# Patient Record
Sex: MALE | Race: WHITE | NOT HISPANIC OR LATINO | Employment: OTHER | URBAN - METROPOLITAN AREA
[De-identification: names, ages, dates, MRNs, and addresses within clinical notes are randomized per-mention and may not be internally consistent; named-entity substitution may affect disease eponyms.]

---

## 2023-11-22 DIAGNOSIS — M25.562 LEFT KNEE PAIN, UNSPECIFIED CHRONICITY: ICD-10-CM

## 2023-11-22 DIAGNOSIS — M79.672 LEFT FOOT PAIN: ICD-10-CM

## 2023-11-29 ENCOUNTER — APPOINTMENT (OUTPATIENT)
Dept: RADIOLOGY | Facility: CLINIC | Age: 73
End: 2023-11-29
Payer: OTHER GOVERNMENT

## 2023-11-29 ENCOUNTER — APPOINTMENT (OUTPATIENT)
Dept: ORTHOPEDIC SURGERY | Facility: CLINIC | Age: 73
End: 2023-11-29
Payer: OTHER GOVERNMENT

## 2023-12-06 ENCOUNTER — ANCILLARY PROCEDURE (OUTPATIENT)
Dept: RADIOLOGY | Facility: CLINIC | Age: 73
End: 2023-12-06
Payer: MEDICARE

## 2023-12-06 ENCOUNTER — OFFICE VISIT (OUTPATIENT)
Dept: ORTHOPEDIC SURGERY | Facility: CLINIC | Age: 73
End: 2023-12-06
Payer: OTHER GOVERNMENT

## 2023-12-06 ENCOUNTER — APPOINTMENT (OUTPATIENT)
Dept: RADIOLOGY | Facility: CLINIC | Age: 73
End: 2023-12-06
Payer: COMMERCIAL

## 2023-12-06 VITALS — BODY MASS INDEX: 35.78 KG/M2 | HEIGHT: 73 IN | WEIGHT: 270 LBS

## 2023-12-06 DIAGNOSIS — M79.672 LEFT FOOT PAIN: ICD-10-CM

## 2023-12-06 DIAGNOSIS — M25.562 LEFT KNEE PAIN, UNSPECIFIED CHRONICITY: ICD-10-CM

## 2023-12-06 DIAGNOSIS — M17.12 PRIMARY OSTEOARTHRITIS OF LEFT KNEE: Primary | ICD-10-CM

## 2023-12-06 PROCEDURE — 73564 X-RAY EXAM KNEE 4 OR MORE: CPT | Mod: LT

## 2023-12-06 PROCEDURE — 99214 OFFICE O/P EST MOD 30 MIN: CPT | Performed by: STUDENT IN AN ORGANIZED HEALTH CARE EDUCATION/TRAINING PROGRAM

## 2023-12-06 PROCEDURE — 1160F RVW MEDS BY RX/DR IN RCRD: CPT | Performed by: STUDENT IN AN ORGANIZED HEALTH CARE EDUCATION/TRAINING PROGRAM

## 2023-12-06 PROCEDURE — 1125F AMNT PAIN NOTED PAIN PRSNT: CPT | Performed by: STUDENT IN AN ORGANIZED HEALTH CARE EDUCATION/TRAINING PROGRAM

## 2023-12-06 PROCEDURE — 1036F TOBACCO NON-USER: CPT | Performed by: STUDENT IN AN ORGANIZED HEALTH CARE EDUCATION/TRAINING PROGRAM

## 2023-12-06 PROCEDURE — 1159F MED LIST DOCD IN RCRD: CPT | Performed by: STUDENT IN AN ORGANIZED HEALTH CARE EDUCATION/TRAINING PROGRAM

## 2023-12-06 PROCEDURE — 20610 DRAIN/INJ JOINT/BURSA W/O US: CPT | Performed by: STUDENT IN AN ORGANIZED HEALTH CARE EDUCATION/TRAINING PROGRAM

## 2023-12-06 PROCEDURE — 73564 X-RAY EXAM KNEE 4 OR MORE: CPT | Mod: LEFT SIDE | Performed by: RADIOLOGY

## 2023-12-06 RX ORDER — BUDESONIDE AND FORMOTEROL FUMARATE DIHYDRATE 160; 4.5 UG/1; UG/1
2 AEROSOL RESPIRATORY (INHALATION) 2 TIMES DAILY
COMMUNITY
End: 2024-03-20 | Stop reason: HOSPADM

## 2023-12-06 RX ORDER — MELOXICAM 15 MG/1
15 TABLET ORAL DAILY
COMMUNITY
Start: 2023-03-06 | End: 2024-03-20 | Stop reason: HOSPADM

## 2023-12-06 RX ORDER — NAPROXEN 500 MG/1
500 TABLET ORAL 2 TIMES DAILY
COMMUNITY
Start: 2023-04-10 | End: 2024-01-30

## 2023-12-06 RX ORDER — LISINOPRIL 5 MG/1
5 TABLET ORAL DAILY
COMMUNITY
End: 2024-03-20 | Stop reason: HOSPADM

## 2023-12-06 RX ORDER — AZITHROMYCIN 250 MG/1
250 TABLET, FILM COATED ORAL DAILY
COMMUNITY
Start: 2018-04-04 | End: 2024-03-20 | Stop reason: HOSPADM

## 2023-12-06 RX ORDER — FLUOXETINE HYDROCHLORIDE 20 MG/1
20 CAPSULE ORAL DAILY
Status: ON HOLD | COMMUNITY
End: 2024-03-15 | Stop reason: SDUPTHER

## 2023-12-06 RX ORDER — ALBUTEROL SULFATE 0.83 MG/ML
2.5 SOLUTION RESPIRATORY (INHALATION)
COMMUNITY
End: 2024-03-20 | Stop reason: HOSPADM

## 2023-12-06 RX ORDER — OMEPRAZOLE 40 MG/1
40 CAPSULE, DELAYED RELEASE ORAL
COMMUNITY
End: 2024-03-20 | Stop reason: HOSPADM

## 2023-12-06 RX ADMIN — LIDOCAINE HYDROCHLORIDE 2 ML: 10 INJECTION INFILTRATION; PERINEURAL at 11:45

## 2023-12-06 RX ADMIN — TRIAMCINOLONE ACETONIDE 40 MG: 40 INJECTION, SUSPENSION INTRA-ARTICULAR; INTRAMUSCULAR at 11:45

## 2023-12-06 ASSESSMENT — PAIN SCALES - GENERAL: PAINLEVEL_OUTOF10: 5 - MODERATE PAIN

## 2023-12-06 ASSESSMENT — PAIN - FUNCTIONAL ASSESSMENT: PAIN_FUNCTIONAL_ASSESSMENT: 0-10

## 2023-12-06 NOTE — PROGRESS NOTES
PRIMARY CARE PHYSICIAN: No primary care provider on file.  REFERRING PROVIDER: No referring provider defined for this encounter.     CONSULT ORTHOPAEDIC: Knee Evaluation    ASSESSMENT & PLAN    Impression: 73 y.o. male with left knee pain.    Plan:   I explained to the patient the nature of their diagnosis.  I reviewed their imaging studies with them.    Based on the history, physical exam and imaging studies above, the patient's presentation is consistent with the above diagnosis.  I had a long discussion with the patient regarding their presentation and the treatment options.  We discussed initial nonoperative versus operative management options as well as potential further diagnostic imaging.   His quadriceps tendon repair is intact.  He is having some anterior knee pain secondary to his early degenerative changes.  I recommend home exercises to work on quadricep strength and hamstring flexibility.  He was provided a program for the above today.  I provided him with a corticosteroid injection into the left knee which he tolerated well.  He will focus on low impact activities and practice good weight management.    Follow-Up: Patient will follow-up as needed    At the end of the visit, all questions were answered in full. The patient is in agreement with the plan and recommendations. They will call the office with any questions/concerns.    Note dictated with allGreenup software. Completed without full typed error editing and sent to avoid delay.       SUBJECTIVE  CHIEF COMPLAINT:   Chief Complaint   Patient presents with    Left Knee - Pain        HPI: Donald Mcknight is a 73 y.o. patient. Donald Mcknight complains of left knee pain. Xrays done. He states his pain started almost 2 years ago. Previous surgery done September 2021. No new injury since his last visit.  He notes that the knee has otherwise been doing well and he feels strong.  His main complaint today is pain.    They deny any  "constant or progressive numbness or tingling in their legs.     REVIEW OF SYSTEMS  Constitutional: See HPI for pain assessment, No significant weight loss, recent trauma  Cardiovascular: No chest pain, shortness of breath  Respiratory: No difficulty breathing, cough  Gastrointestinal: No nausea, vomiting, diarrhea, constipation  Musculoskeletal: Noted in HPI, positive for pain, restricted motion, stiffness  Integumentary: No rashes, easy bruising, redness   Neurological: no numbness or tingling in extremities, no gait disturbances   Psychiatric: No mood changes, memory changes, social issues  Heme/Lymph: no excessive swelling, easy bruising, excessive bleeding  ENT: No hearing changes  Eyes: No vision changes    No past medical history on file.     Not on File     No past surgical history on file.     No family history on file.     Social History     Socioeconomic History    Marital status:      Spouse name: Not on file    Number of children: Not on file    Years of education: Not on file    Highest education level: Not on file   Occupational History    Not on file   Tobacco Use    Smoking status: Not on file    Smokeless tobacco: Not on file   Substance and Sexual Activity    Alcohol use: Not on file    Drug use: Not on file    Sexual activity: Not on file   Other Topics Concern    Not on file   Social History Narrative    Not on file     Social Determinants of Health     Financial Resource Strain: Not on file   Food Insecurity: Not on file   Transportation Needs: Not on file   Physical Activity: Not on file   Stress: Not on file   Social Connections: Not on file   Intimate Partner Violence: Not on file   Housing Stability: Not on file        CURRENT MEDICATIONS:   No current outpatient medications on file.     No current facility-administered medications for this visit.        OBJECTIVE    PHYSICAL EXAM      4/4/2018     4:35 AM 4/26/2023    10:10 AM   Vitals   Height (in) 1.854 m (6' 0.99\") 1.854 m (6' 1\") "   Weight (lb) 284.83 288.5   BMI 37.59 kg/m2 38.06 kg/m2   BSA (m2) 2.58 m2 2.6 m2      There is no height or weight on file to calculate BMI.    GENERAL: A/Ox3, NAD. Appears healthy, well nourished  PSYCHIATRIC: Mood stable, appropriate memory recall  EYES: EOM intact, no scleral icterus  CARDIOVASCULAR: Palpable peripheral pulses  LUNGS: Breathing non-labored on room air  SKIN: no erythema, rashes, or ecchymoses     MUSCULOSKELETAL:  Laterality: left Knee Exam  - Skin intact, prior surgical incision well-healed  - No erythema or warmth  - No ecchymosis or soft tissue swelling  - Alignment: varus  - Palpation: Mild tenderness of the medial and lateral patellar facets, positive tenderness medial and lateral joint lines  - ROM: 0 - 0 - 120  - Effusion: Trace  - Strength: knee extension and flexion 5/5, EHL/PF/DF motor intact  - Stability:        Anterior drawer stable       Posterior drawer stable       Varus/valgus stable       negative Lachman  -Equivocal Wily's  - Gait: Trace antalgic favoring the left  - Hip Exam: flexion to 100+ degrees, full extension, internal/external rotation adequate, and no pain with log roll  - Special Tests: Able to form a straight leg raise, quadriceps firing well, prior quadriceps tendon repair appears to be intact    NEUROVASCULAR:  - Neurovascular Status: sensation intact to light touch distally, lower extremity motor intact  - Capillary refill brisk at extremities, Bilateral dorsalis pedis pulse 2+    Imaging: Multiple views of the affected left knee(s) demonstrate: Mild degenerative changes without acute osseous abnormality.   X-rays were personally reviewed and interpreted by me.  Radiology reports were reviewed by me as well, if readily available at the time.    L Inj/Asp: L knee on 12/6/2023 11:45 AM  Indications: pain  Details: 25 G needle  Medications: 40 mg triamcinolone acetonide 40 mg/mL; 2 mL lidocaine 10 mg/mL (1 %)  Procedure, treatment alternatives, risks and  benefits explained, specific risks discussed. Consent was given by the patient. Immediately prior to procedure a time out was called to verify the correct patient, procedure, equipment, support staff and site/side marked as required. Patient was prepped and draped in the usual sterile fashion.               Teofilo Ghosh MD  Attending Surgeon    Sports Medicine Orthopaedic Surgery  [facility]

## 2023-12-11 RX ORDER — LIDOCAINE HYDROCHLORIDE 10 MG/ML
2 INJECTION INFILTRATION; PERINEURAL
Status: COMPLETED | OUTPATIENT
Start: 2023-12-06 | End: 2023-12-06

## 2023-12-11 RX ORDER — TRIAMCINOLONE ACETONIDE 40 MG/ML
40 INJECTION, SUSPENSION INTRA-ARTICULAR; INTRAMUSCULAR
Status: COMPLETED | OUTPATIENT
Start: 2023-12-06 | End: 2023-12-06

## 2023-12-17 ENCOUNTER — HOSPITAL ENCOUNTER (EMERGENCY)
Facility: HOSPITAL | Age: 73
Discharge: HOME | End: 2023-12-17
Payer: OTHER GOVERNMENT

## 2023-12-17 ENCOUNTER — APPOINTMENT (OUTPATIENT)
Dept: RADIOLOGY | Facility: HOSPITAL | Age: 73
End: 2023-12-17
Payer: OTHER GOVERNMENT

## 2023-12-17 VITALS
TEMPERATURE: 97.6 F | HEIGHT: 73 IN | RESPIRATION RATE: 18 BRPM | SYSTOLIC BLOOD PRESSURE: 143 MMHG | DIASTOLIC BLOOD PRESSURE: 92 MMHG | WEIGHT: 268 LBS | OXYGEN SATURATION: 94 % | HEART RATE: 86 BPM | BODY MASS INDEX: 35.52 KG/M2

## 2023-12-17 DIAGNOSIS — S29.019A THORACIC MYOFASCIAL STRAIN, INITIAL ENCOUNTER: Primary | ICD-10-CM

## 2023-12-17 PROCEDURE — 99284 EMERGENCY DEPT VISIT MOD MDM: CPT

## 2023-12-17 PROCEDURE — 71046 X-RAY EXAM CHEST 2 VIEWS: CPT | Mod: FY

## 2023-12-17 PROCEDURE — 99283 EMERGENCY DEPT VISIT LOW MDM: CPT | Mod: 25

## 2023-12-17 PROCEDURE — 96372 THER/PROPH/DIAG INJ SC/IM: CPT

## 2023-12-17 PROCEDURE — 2500000004 HC RX 250 GENERAL PHARMACY W/ HCPCS (ALT 636 FOR OP/ED): Performed by: PHYSICIAN ASSISTANT

## 2023-12-17 PROCEDURE — 71046 X-RAY EXAM CHEST 2 VIEWS: CPT | Performed by: RADIOLOGY

## 2023-12-17 RX ORDER — KETOROLAC TROMETHAMINE 30 MG/ML
30 INJECTION, SOLUTION INTRAMUSCULAR; INTRAVENOUS ONCE
Status: COMPLETED | OUTPATIENT
Start: 2023-12-17 | End: 2023-12-17

## 2023-12-17 RX ORDER — METHOCARBAMOL 500 MG/1
500 TABLET, FILM COATED ORAL EVERY 12 HOURS PRN
Qty: 20 TABLET | Refills: 0 | OUTPATIENT
Start: 2023-12-17 | End: 2024-01-30

## 2023-12-17 RX ORDER — ORPHENADRINE CITRATE 30 MG/ML
60 INJECTION INTRAMUSCULAR; INTRAVENOUS ONCE
Status: COMPLETED | OUTPATIENT
Start: 2023-12-17 | End: 2023-12-17

## 2023-12-17 RX ADMIN — ORPHENADRINE CITRATE 60 MG: 60 INJECTION INTRAMUSCULAR; INTRAVENOUS at 15:39

## 2023-12-17 RX ADMIN — KETOROLAC TROMETHAMINE 30 MG: 30 INJECTION, SOLUTION INTRAMUSCULAR at 15:39

## 2023-12-17 ASSESSMENT — COLUMBIA-SUICIDE SEVERITY RATING SCALE - C-SSRS
1. IN THE PAST MONTH, HAVE YOU WISHED YOU WERE DEAD OR WISHED YOU COULD GO TO SLEEP AND NOT WAKE UP?: NO
6. HAVE YOU EVER DONE ANYTHING, STARTED TO DO ANYTHING, OR PREPARED TO DO ANYTHING TO END YOUR LIFE?: NO
2. HAVE YOU ACTUALLY HAD ANY THOUGHTS OF KILLING YOURSELF?: NO

## 2023-12-17 ASSESSMENT — PAIN - FUNCTIONAL ASSESSMENT: PAIN_FUNCTIONAL_ASSESSMENT: 0-10

## 2023-12-17 ASSESSMENT — PAIN SCALES - GENERAL: PAINLEVEL_OUTOF10: 10 - WORST POSSIBLE PAIN

## 2023-12-17 NOTE — ED PROVIDER NOTES
HPI   Chief Complaint   Patient presents with    Back Pain     Started 12/13/23, worsening since. Hurts across the upper back and down along the spine. No difficulty with urinating or bm.        History of present illness: 73-year-old male complains of upper back pain for the past 2 days.  Patient states he was walking upstairs and felt his left leg slipped and grabbed onto the door frame twisting his upper back.  Says that he did develop pain at that point.  He then attempted to  a mau litter pale and thought it was heavier than it was and it twisted his body again.  Says that he has increasing pain and stiffness in his upper back.  He has been taking anti-inflammatories and Bengay with some but incomplete improvement.  Denies any anterior chest pain, shortness of breath, lightheadedness, dizziness, nausea vomiting, diarrhea, constipation, paresthesias, incontinence, neck stiffness, headache.    Review of systems: Constitutional, eye, ENT, cardiovascular, respiratory, gastrointestinal, genitourinary, neurologic, musculoskeletal, dermatologic, hematologic, endocrine systems were evaluated and were negative unless otherwise specified in history of present illness.    Medications: Reviewed and per nursing note.    Family history: Denies relevant medical conditions.    Past medical history: None per patient    Social history: Denies tobacco, alcohol, drug use.      Physical exam:    Appearance: Well-developed, well-nourished, nontoxic-appearing, alert and oriented x3. Talking in complete sentences.    HEENT:  Head normocephalic atraumatic, extraocular movements intact, pupils equal round reactive to light, mucous membranes are moist and pink.    NECK:  Nml Inspection, no meningismus, no thyromegaly, no lymphadenopathy, no JVD, trachea is midline.    Respiratory: Clear to auscultation bilaterally with normal bilateral excursion. No wheezes, rhonchi, crackles.    Cardiovascular: Regular rate and rhythm, no  murmurs, rubs or gallops. Pulses 2+ symmetrically in the dorsalis pedis and radial pulses.    Abdomen/GI:  Soft, nontender, nondistended, normal bowel sounds x4. No masses or organomegaly.    :  No CVA tenderness    Neuro:  Oriented x 3, Speech Clear, cranial nerves grossly intact. Normal sensation to light touch in all 4 extremities.    Musculoskeletal: Parathoracic back tenderness with no vertebral point tenderness.  Patient spontaneously moves all 4 extremities.    Skin:  No cyanosis, clubbing, edema, open wounds, or rashes.                          [place] Coma Scale Score: 15                  Patient History   No past medical history on file.  No past surgical history on file.  No family history on file.  Social History     Tobacco Use    Smoking status: Former     Types: Cigarettes    Smokeless tobacco: Never   Substance Use Topics    Alcohol use: Not on file    Drug use: Not on file       Physical Exam   ED Triage Vitals [12/17/23 1359]   Temp Heart Rate Resp BP   36.4 °C (97.6 °F) 86 18 (!) 143/92      SpO2 Temp Source Heart Rate Source Patient Position   94 % Temporal Monitor Sitting      BP Location FiO2 (%)     Left arm --       Physical Exam    ED Course & MDM   Diagnoses as of 12/17/23 1736   Thoracic myofascial strain, initial encounter       Medical Decision Making  XR chest 2 views   Final Result    Bibasilar atelectasis.          MACRO:    None          Signed by: Grady Valladares 12/17/2023 4:06 PM    Dictation workstation:   [id number]       Patient complains of upper back pain for the past few days.  Differential diagnosis of thoracic strain, thoracic fracture, rib fracture, pneumothorax,.  Dissection.  Examination shows reproducible parathoracic tenderness and spasm.  Patient reports injury from twisting motion.  Presentation appears to be most consistent with thoracic strain.  X-ray of the chest was performed showing no mediastinal widening, pneumothorax, fracture.  He was given Toradol  Norflex offering improvement of symptoms.  This again supports diagnosis of thoracic strain.    This patient will be discharged to home with prescription for methocarbomol.  Patient is educated in signs and symptoms of worsening symptoms and reasons to come back to the emergency department.  Will need to follow up with primary care provider.  Patient does not report social determinants of health impacting ability to obtain care that is needed.  Patient agrees with plan.    This is a transcription.  Text was reviewed for errors, but some transcription errors may remain.  Please call for any questions.          Procedure  Procedures     Timur Leos PA-C  12/17/23 3995

## 2024-01-27 ENCOUNTER — APPOINTMENT (OUTPATIENT)
Dept: RADIOLOGY | Facility: HOSPITAL | Age: 74
End: 2024-01-27
Payer: OTHER GOVERNMENT

## 2024-01-27 ENCOUNTER — HOSPITAL ENCOUNTER (EMERGENCY)
Facility: HOSPITAL | Age: 74
Discharge: HOME | End: 2024-01-27
Attending: EMERGENCY MEDICINE
Payer: OTHER GOVERNMENT

## 2024-01-27 ENCOUNTER — APPOINTMENT (OUTPATIENT)
Dept: CARDIOLOGY | Facility: HOSPITAL | Age: 74
End: 2024-01-27
Payer: OTHER GOVERNMENT

## 2024-01-27 VITALS
HEART RATE: 70 BPM | TEMPERATURE: 98.1 F | OXYGEN SATURATION: 95 % | HEIGHT: 73 IN | WEIGHT: 265 LBS | RESPIRATION RATE: 22 BRPM | BODY MASS INDEX: 35.12 KG/M2 | DIASTOLIC BLOOD PRESSURE: 107 MMHG | SYSTOLIC BLOOD PRESSURE: 169 MMHG

## 2024-01-27 DIAGNOSIS — R07.89 CHEST WALL PAIN: Primary | ICD-10-CM

## 2024-01-27 LAB
ALBUMIN SERPL BCP-MCNC: 3.8 G/DL (ref 3.4–5)
ALP SERPL-CCNC: 49 U/L (ref 33–136)
ALT SERPL W P-5'-P-CCNC: 13 U/L (ref 10–52)
ANION GAP SERPL CALC-SCNC: 12 MMOL/L (ref 10–20)
AST SERPL W P-5'-P-CCNC: 22 U/L (ref 9–39)
BASOPHILS # BLD AUTO: 0.09 X10*3/UL (ref 0–0.1)
BASOPHILS NFR BLD AUTO: 1.1 %
BILIRUB SERPL-MCNC: 0.4 MG/DL (ref 0–1.2)
BNP SERPL-MCNC: 43 PG/ML (ref 0–99)
BUN SERPL-MCNC: 14 MG/DL (ref 6–23)
CALCIUM SERPL-MCNC: 8.8 MG/DL (ref 8.6–10.3)
CARDIAC TROPONIN I PNL SERPL HS: 7 NG/L (ref 0–20)
CHLORIDE SERPL-SCNC: 100 MMOL/L (ref 98–107)
CO2 SERPL-SCNC: 28 MMOL/L (ref 21–32)
CREAT SERPL-MCNC: 1.17 MG/DL (ref 0.5–1.3)
D DIMER PPP FEU-MCNC: 1103 NG/ML FEU
EGFRCR SERPLBLD CKD-EPI 2021: 66 ML/MIN/1.73M*2
EOSINOPHIL # BLD AUTO: 0.21 X10*3/UL (ref 0–0.4)
EOSINOPHIL NFR BLD AUTO: 2.5 %
ERYTHROCYTE [DISTWIDTH] IN BLOOD BY AUTOMATED COUNT: 14.3 % (ref 11.5–14.5)
GLUCOSE SERPL-MCNC: 93 MG/DL (ref 74–99)
HCT VFR BLD AUTO: 44.3 % (ref 41–52)
HGB BLD-MCNC: 15 G/DL (ref 13.5–17.5)
IMM GRANULOCYTES # BLD AUTO: 0.03 X10*3/UL (ref 0–0.5)
IMM GRANULOCYTES NFR BLD AUTO: 0.4 % (ref 0–0.9)
LYMPHOCYTES # BLD AUTO: 1.73 X10*3/UL (ref 0.8–3)
LYMPHOCYTES NFR BLD AUTO: 20.8 %
MCH RBC QN AUTO: 30.1 PG (ref 26–34)
MCHC RBC AUTO-ENTMCNC: 33.9 G/DL (ref 32–36)
MCV RBC AUTO: 89 FL (ref 80–100)
MONOCYTES # BLD AUTO: 0.75 X10*3/UL (ref 0.05–0.8)
MONOCYTES NFR BLD AUTO: 9 %
NEUTROPHILS # BLD AUTO: 5.52 X10*3/UL (ref 1.6–5.5)
NEUTROPHILS NFR BLD AUTO: 66.2 %
NRBC BLD-RTO: 0 /100 WBCS (ref 0–0)
PLATELET # BLD AUTO: 209 X10*3/UL (ref 150–450)
POTASSIUM SERPL-SCNC: 4.2 MMOL/L (ref 3.5–5.3)
PROT SERPL-MCNC: 7.1 G/DL (ref 6.4–8.2)
RBC # BLD AUTO: 4.99 X10*6/UL (ref 4.5–5.9)
SODIUM SERPL-SCNC: 136 MMOL/L (ref 136–145)
WBC # BLD AUTO: 8.3 X10*3/UL (ref 4.4–11.3)

## 2024-01-27 PROCEDURE — 82565 ASSAY OF CREATININE: CPT | Performed by: EMERGENCY MEDICINE

## 2024-01-27 PROCEDURE — 96374 THER/PROPH/DIAG INJ IV PUSH: CPT

## 2024-01-27 PROCEDURE — 85025 COMPLETE CBC W/AUTO DIFF WBC: CPT | Performed by: EMERGENCY MEDICINE

## 2024-01-27 PROCEDURE — 71045 X-RAY EXAM CHEST 1 VIEW: CPT

## 2024-01-27 PROCEDURE — 36415 COLL VENOUS BLD VENIPUNCTURE: CPT | Performed by: EMERGENCY MEDICINE

## 2024-01-27 PROCEDURE — 71045 X-RAY EXAM CHEST 1 VIEW: CPT | Performed by: RADIOLOGY

## 2024-01-27 PROCEDURE — 84484 ASSAY OF TROPONIN QUANT: CPT | Performed by: EMERGENCY MEDICINE

## 2024-01-27 PROCEDURE — 83880 ASSAY OF NATRIURETIC PEPTIDE: CPT | Performed by: EMERGENCY MEDICINE

## 2024-01-27 PROCEDURE — 71275 CT ANGIOGRAPHY CHEST: CPT

## 2024-01-27 PROCEDURE — 2550000001 HC RX 255 CONTRASTS: Performed by: EMERGENCY MEDICINE

## 2024-01-27 PROCEDURE — 85379 FIBRIN DEGRADATION QUANT: CPT | Performed by: EMERGENCY MEDICINE

## 2024-01-27 PROCEDURE — 93005 ELECTROCARDIOGRAM TRACING: CPT

## 2024-01-27 PROCEDURE — 2500000004 HC RX 250 GENERAL PHARMACY W/ HCPCS (ALT 636 FOR OP/ED): Performed by: EMERGENCY MEDICINE

## 2024-01-27 PROCEDURE — 2500000001 HC RX 250 WO HCPCS SELF ADMINISTERED DRUGS (ALT 637 FOR MEDICARE OP): Performed by: EMERGENCY MEDICINE

## 2024-01-27 PROCEDURE — 71275 CT ANGIOGRAPHY CHEST: CPT | Performed by: RADIOLOGY

## 2024-01-27 PROCEDURE — 99285 EMERGENCY DEPT VISIT HI MDM: CPT | Mod: 25

## 2024-01-27 RX ORDER — MORPHINE SULFATE 4 MG/ML
4 INJECTION, SOLUTION INTRAMUSCULAR; INTRAVENOUS ONCE
Status: COMPLETED | OUTPATIENT
Start: 2024-01-27 | End: 2024-01-27

## 2024-01-27 RX ORDER — CYCLOBENZAPRINE HCL 10 MG
10 TABLET ORAL ONCE
Status: COMPLETED | OUTPATIENT
Start: 2024-01-27 | End: 2024-01-27

## 2024-01-27 RX ADMIN — IOHEXOL 75 ML: 350 INJECTION, SOLUTION INTRAVENOUS at 18:07

## 2024-01-27 RX ADMIN — MORPHINE SULFATE 4 MG: 4 INJECTION, SOLUTION INTRAMUSCULAR; INTRAVENOUS at 18:39

## 2024-01-27 RX ADMIN — CYCLOBENZAPRINE 10 MG: 10 TABLET, FILM COATED ORAL at 20:04

## 2024-01-27 ASSESSMENT — PAIN - FUNCTIONAL ASSESSMENT: PAIN_FUNCTIONAL_ASSESSMENT: 0-10

## 2024-01-27 ASSESSMENT — PAIN DESCRIPTION - LOCATION
LOCATION: CHEST
LOCATION: CHEST

## 2024-01-27 ASSESSMENT — LIFESTYLE VARIABLES
REASON UNABLE TO ASSESS: NO
HAVE PEOPLE ANNOYED YOU BY CRITICIZING YOUR DRINKING: NO
HAVE YOU EVER FELT YOU SHOULD CUT DOWN ON YOUR DRINKING: NO
EVER FELT BAD OR GUILTY ABOUT YOUR DRINKING: NO
EVER HAD A DRINK FIRST THING IN THE MORNING TO STEADY YOUR NERVES TO GET RID OF A HANGOVER: NO

## 2024-01-27 ASSESSMENT — PAIN SCALES - GENERAL
PAINLEVEL_OUTOF10: 6
PAINLEVEL_OUTOF10: 5 - MODERATE PAIN

## 2024-01-27 ASSESSMENT — PAIN DESCRIPTION - ORIENTATION: ORIENTATION: RIGHT

## 2024-01-27 NOTE — ED PROVIDER NOTES
HPI   Chief Complaint   Patient presents with    Chest Pain    Shortness of Breath       Donald Mcknight is a 73 y.o. male with a past medical history of COPD and hypertension presenting to the emergency department with shortness of breath and chest pain beginning approximately 5 days ago.  The patient states that the pain was mild and became progressively worse over time.  The patient states now that the pain is a 12/10 with the pain beginning on the right side and radiating to the middle.  He describes the pain as sharp.  He denies any trauma to the chest or fall.  The patient states that  accompanying the chest pain is some shortness of breath which is worse with inspiration. He states that when he coughs, the pain is worse and takes his breath away. The patient endorses some lightheadedness but denies any nausea or vomiting.        History provided by:  Patient                      [place] Coma Scale Score: 15                  Patient History   No past medical history on file.  No past surgical history on file.  No family history on file.  Social History     Tobacco Use    Smoking status: Former     Types: Cigarettes    Smokeless tobacco: Never   Substance Use Topics    Alcohol use: Not on file    Drug use: Not on file       Physical Exam   ED Triage Vitals [01/27/24 1711]   Temperature Heart Rate Respirations BP   36.7 °C (98.1 °F) 87 18 (!) 133/109      Pulse Ox Temp src Heart Rate Source Patient Position   (!) 93 % -- -- --      BP Location FiO2 (%)     -- --       Physical Exam  Constitutional:       General: He is in acute distress.      Appearance: He is obese. He is not ill-appearing.   HENT:      Head: Normocephalic.   Eyes:      Pupils: Pupils are equal, round, and reactive to light.   Cardiovascular:      Rate and Rhythm: Normal rate and regular rhythm.      Pulses:           Carotid pulses are 2+ on the right side and 2+ on the left side.       Radial pulses are 2+ on the right side and 2+ on the left  side.        Dorsalis pedis pulses are 2+ on the right side and 2+ on the left side.        Posterior tibial pulses are 2+ on the right side and 2+ on the left side.      Heart sounds: Normal heart sounds. Heart sounds not distant. No murmur heard.  Pulmonary:      Effort: Pulmonary effort is normal. No accessory muscle usage or respiratory distress.      Breath sounds: Normal breath sounds.   Chest:      Chest wall: Tenderness present. No deformity or crepitus.   Abdominal:      General: Bowel sounds are normal.      Palpations: Abdomen is soft.      Tenderness: There is no abdominal tenderness. There is no guarding or rebound.   Musculoskeletal:         General: Normal range of motion.      Cervical back: Normal range of motion.      Right lower leg: Edema present.      Left lower leg: Edema present.   Skin:     General: Skin is warm and dry.      Capillary Refill: Capillary refill takes less than 2 seconds.   Neurological:      General: No focal deficit present.      Mental Status: He is alert and oriented to person, place, and time.   Psychiatric:         Mood and Affect: Mood normal.         Behavior: Behavior normal.         ED Course & MDM        Medical Decision Making  Differential diagnoses include CHF, COPD, pulmonary embolism, musculoskeletal pain, pneumonia, or pleural effusion. EKG was performed. Labs were drawn including CBC, CMP, Troponin, BNP, and D Dimer. Imaging was ordered including a chest x-ray.    Amount and/or Complexity of Data Reviewed  Labs: ordered.  Radiology: ordered.  ECG/medicine tests: ordered.        Procedure  Procedures     Kris Palacios  01/27/24 1268

## 2024-01-30 ENCOUNTER — HOSPITAL ENCOUNTER (EMERGENCY)
Facility: HOSPITAL | Age: 74
Discharge: HOME | End: 2024-01-30
Attending: EMERGENCY MEDICINE
Payer: OTHER GOVERNMENT

## 2024-01-30 ENCOUNTER — PHARMACY VISIT (OUTPATIENT)
Dept: PHARMACY | Facility: CLINIC | Age: 74
End: 2024-01-30
Payer: COMMERCIAL

## 2024-01-30 VITALS
BODY MASS INDEX: 34.99 KG/M2 | WEIGHT: 264 LBS | OXYGEN SATURATION: 94 % | TEMPERATURE: 98.2 F | HEIGHT: 73 IN | RESPIRATION RATE: 18 BRPM | SYSTOLIC BLOOD PRESSURE: 149 MMHG | HEART RATE: 78 BPM | DIASTOLIC BLOOD PRESSURE: 89 MMHG

## 2024-01-30 DIAGNOSIS — R07.89 CHEST WALL PAIN: Primary | ICD-10-CM

## 2024-01-30 PROCEDURE — 99283 EMERGENCY DEPT VISIT LOW MDM: CPT

## 2024-01-30 PROCEDURE — RXMED WILLOW AMBULATORY MEDICATION CHARGE

## 2024-01-30 PROCEDURE — 2500000005 HC RX 250 GENERAL PHARMACY W/O HCPCS: Performed by: NURSE PRACTITIONER

## 2024-01-30 PROCEDURE — 2500000004 HC RX 250 GENERAL PHARMACY W/ HCPCS (ALT 636 FOR OP/ED): Performed by: NURSE PRACTITIONER

## 2024-01-30 PROCEDURE — 96372 THER/PROPH/DIAG INJ SC/IM: CPT

## 2024-01-30 RX ORDER — LIDOCAINE 560 MG/1
1 PATCH PERCUTANEOUS; TOPICAL; TRANSDERMAL ONCE
Status: DISCONTINUED | OUTPATIENT
Start: 2024-01-30 | End: 2024-01-30 | Stop reason: HOSPADM

## 2024-01-30 RX ORDER — ORPHENADRINE CITRATE 30 MG/ML
60 INJECTION INTRAMUSCULAR; INTRAVENOUS ONCE
Status: COMPLETED | OUTPATIENT
Start: 2024-01-30 | End: 2024-01-30

## 2024-01-30 RX ORDER — LIDOCAINE 50 MG/G
1 PATCH TOPICAL DAILY
Qty: 10 PATCH | Refills: 0 | Status: SHIPPED | OUTPATIENT
Start: 2024-01-30 | End: 2024-03-20 | Stop reason: HOSPADM

## 2024-01-30 RX ORDER — NAPROXEN 500 MG/1
500 TABLET ORAL
Qty: 14 TABLET | Refills: 0 | Status: SHIPPED | OUTPATIENT
Start: 2024-01-30 | End: 2024-02-06

## 2024-01-30 RX ORDER — KETOROLAC TROMETHAMINE 30 MG/ML
15 INJECTION, SOLUTION INTRAMUSCULAR; INTRAVENOUS ONCE
Status: COMPLETED | OUTPATIENT
Start: 2024-01-30 | End: 2024-01-30

## 2024-01-30 RX ORDER — METHOCARBAMOL 500 MG/1
500 TABLET, FILM COATED ORAL 2 TIMES DAILY
Qty: 20 TABLET | Refills: 0 | Status: SHIPPED | OUTPATIENT
Start: 2024-01-30 | End: 2024-03-20 | Stop reason: HOSPADM

## 2024-01-30 RX ADMIN — LIDOCAINE 1 PATCH: 4 PATCH TOPICAL at 19:29

## 2024-01-30 RX ADMIN — ORPHENADRINE CITRATE 60 MG: 60 INJECTION INTRAMUSCULAR; INTRAVENOUS at 19:27

## 2024-01-30 RX ADMIN — KETOROLAC TROMETHAMINE 15 MG: 30 INJECTION, SOLUTION INTRAMUSCULAR at 19:26

## 2024-01-30 ASSESSMENT — PAIN DESCRIPTION - ORIENTATION: ORIENTATION: RIGHT

## 2024-01-30 ASSESSMENT — PAIN - FUNCTIONAL ASSESSMENT
PAIN_FUNCTIONAL_ASSESSMENT: 0-10
PAIN_FUNCTIONAL_ASSESSMENT: 0-10

## 2024-01-30 ASSESSMENT — PAIN DESCRIPTION - DESCRIPTORS: DESCRIPTORS: ACHING

## 2024-01-30 ASSESSMENT — PAIN DESCRIPTION - LOCATION: LOCATION: RIB CAGE

## 2024-01-30 ASSESSMENT — PAIN SCALES - GENERAL: PAINLEVEL_OUTOF10: 5 - MODERATE PAIN

## 2024-01-30 ASSESSMENT — PAIN DESCRIPTION - PAIN TYPE: TYPE: ACUTE PAIN

## 2024-01-31 LAB
ATRIAL RATE: 88 BPM
P AXIS: 62 DEGREES
PR INTERVAL: 155 MS
Q ONSET: 249 MS
QRS COUNT: 14 BEATS
QRS DURATION: 90 MS
QT INTERVAL: 393 MS
QTC CALCULATION(BAZETT): 473 MS
QTC FREDERICIA: 444 MS
R AXIS: 22 DEGREES
T AXIS: 6 DEGREES
T OFFSET: 446 MS
VENTRICULAR RATE: 87 BPM

## 2024-01-31 NOTE — ED PROVIDER NOTES
HPI   Chief Complaint   Patient presents with    right rib cage/ back pain       This is a 73-year-old  male that is presenting to the emergency room with complaints of right chest pain that started approximately 9 days ago.  The patient bent down to  water jug.  He noticed that there was something on the counter and twisted to put it on the stove.  The patient reports that he heard a lot of popping and cracking to the right chest and back.  He has been having pain in that area ever since.  The patient was seen in the emergency room 3 days ago.  He had a complete evaluation including for rib fracture, pneumonia, pneumothorax, and pulmonary embolism, which is negative.  They suspected that the patient was suffering from a muscle strain.  He was advised to use Tylenol Motrin over-the-counter and obtain Lidoderm patches from the pharmacy.  The patient states he has been taking ibuprofen has not had any relief of his pain symptoms.  He has been sleeping in a recliner because it hurts too much to lay down.  He usually sleeps on his right side.  The patient denies any shortness of breath but states that it does hurt to take in a deep breath.  He is not exhibiting any rash to the area that be indicative of shingles infection.  Denies any alteration in his urine function.  Reports that he has had some constipation which is not like him.  He denies any rectal pain or abdominal pain.  He is not having any nausea or vomiting.  He is not experiencing any fever that would indicate flulike symptoms.                          [place] Coma Scale Score: 15                  Patient History   No past medical history on file.  No past surgical history on file.  No family history on file.  Social History     Tobacco Use    Smoking status: Former     Types: Cigarettes    Smokeless tobacco: Never   Substance Use Topics    Alcohol use: Not on file    Drug use: Not on file       Physical Exam   ED Triage Vitals [01/30/24  1834]   Temperature Heart Rate Respirations BP   36.4 °C (97.5 °F) 82 18 (!) 169/92      Pulse Ox Temp Source Heart Rate Source Patient Position   94 % Temporal Monitor Sitting      BP Location FiO2 (%)     Left arm --       Physical Exam  Vitals and nursing note reviewed.   HENT:      Head: Normocephalic.      Right Ear: External ear normal.      Left Ear: External ear normal.      Nose: Nose normal.      Mouth/Throat:      Pharynx: Oropharynx is clear.   Eyes:      Conjunctiva/sclera: Conjunctivae normal.   Cardiovascular:      Pulses: Normal pulses.   Pulmonary:      Effort: Pulmonary effort is normal.      Breath sounds: Normal breath sounds.      Comments: The patient has focal tenderness to the right mid anterolateral ribs.  No crepitus appreciated.  No rash.  No signs of trauma.  Chest:      Chest wall: Tenderness present.   Abdominal:      General: Bowel sounds are normal.      Palpations: Abdomen is soft.   Musculoskeletal:         General: Normal range of motion.   Skin:     General: Skin is warm.      Capillary Refill: Capillary refill takes less than 2 seconds.   Neurological:      General: No focal deficit present.      Mental Status: He is alert.   Psychiatric:         Mood and Affect: Mood normal.         ED Course & MDM   Diagnoses as of 01/30/24 2118   Chest wall pain       Medical Decision Making  The patient was seen and evaluated with the attending physician, Dr. Hernandez.  The patient is presenting to the emergency room with complaints of persistent pain.  He states that he was taking Motrin for his discomfort.  He denies any new injury.  He denies any respiratory distress.  He is not having any fever or cold-like symptoms.  Old records were obtained and reviewed.  The patient had a thorough evaluation which was negative for rib fracture, pneumothorax, pneumonia, pulmonary embolism, ACS, or other acute process.  We did not feel that further imaging was warranted.  The patient was medicated with  Toradol IM, Norflex IM, and a Lidoderm patch was placed on the affected area.  The patient reported improvement of his pain symptoms.  The patient was provided a prescription for naproxen, Robaxin, and Lidoderm patches.  He is to refrain from any activities that will exacerbate his pain.  The patient is to follow up with their primary care physician in the next 2-3 days.  The patient is to return to the ED worse in any way.  The patient was discharged in stable condition with computer discharge instructions given. Patient was agreeable with discharge planning.        Procedure  Procedures     SHARON Chauhan-CNP  01/30/24 2127

## 2024-02-02 ENCOUNTER — HOSPITAL ENCOUNTER (EMERGENCY)
Facility: HOSPITAL | Age: 74
Discharge: HOME | End: 2024-02-03
Attending: EMERGENCY MEDICINE
Payer: OTHER GOVERNMENT

## 2024-02-02 VITALS
DIASTOLIC BLOOD PRESSURE: 97 MMHG | RESPIRATION RATE: 16 BRPM | OXYGEN SATURATION: 98 % | BODY MASS INDEX: 35.52 KG/M2 | HEART RATE: 69 BPM | WEIGHT: 268 LBS | HEIGHT: 73 IN | SYSTOLIC BLOOD PRESSURE: 153 MMHG | TEMPERATURE: 98 F

## 2024-02-02 DIAGNOSIS — R07.89 PAIN, CHEST WALL: Primary | ICD-10-CM

## 2024-02-02 PROCEDURE — 96372 THER/PROPH/DIAG INJ SC/IM: CPT

## 2024-02-02 PROCEDURE — 99284 EMERGENCY DEPT VISIT MOD MDM: CPT | Mod: 25 | Performed by: EMERGENCY MEDICINE

## 2024-02-02 PROCEDURE — 2500000004 HC RX 250 GENERAL PHARMACY W/ HCPCS (ALT 636 FOR OP/ED): Performed by: EMERGENCY MEDICINE

## 2024-02-02 PROCEDURE — 99283 EMERGENCY DEPT VISIT LOW MDM: CPT | Mod: 25

## 2024-02-02 RX ORDER — MORPHINE SULFATE 4 MG/ML
8 INJECTION, SOLUTION INTRAMUSCULAR; INTRAVENOUS ONCE
Status: COMPLETED | OUTPATIENT
Start: 2024-02-02 | End: 2024-02-02

## 2024-02-02 RX ADMIN — MORPHINE SULFATE 8 MG: 4 INJECTION, SOLUTION INTRAMUSCULAR; INTRAVENOUS at 23:13

## 2024-02-02 ASSESSMENT — PAIN SCALES - GENERAL
PAINLEVEL_OUTOF10: 10 - WORST POSSIBLE PAIN
PAINLEVEL_OUTOF10: 7

## 2024-02-03 NOTE — ED PROVIDER NOTES
HPI   Chief Complaint   Patient presents with   • Rib Injury     Pt states he fell about a month ago, was seen here Monday and was told his ribs were fine, [facility] some fx ribs. Pt having difficulty breathing    • Pain With Breathing       Patient continues with right chest wall pain.  I saw him 2 days ago as well as 5 days ago on his initial evaluation.  He states that he had a sharp pain in his right chest wall region.  He had a full and complete workup at that time including a CTA of the chest which showed no acute abnormalities.  His troponins were negative.  It is likely musculoskeletal.  He then came back yesterday for more pain.  He continues with pain on the right chest wall region.  Is worse with movement.  He states that he took the medications given to him yesterday as well as [facility] and it is not helping.  He was given Robaxin and naproxen without any relief.                          [place] Coma Scale Score: 15                  Patient History   No past medical history on file.  No past surgical history on file.  No family history on file.  Social History     Tobacco Use   • Smoking status: Former     Types: Cigarettes   • Smokeless tobacco: Never   Substance Use Topics   • Alcohol use: Not on file   • Drug use: Not on file       Physical Exam   ED Triage Vitals [02/02/24 2007]   Temperature Heart Rate Respirations BP   36.7 °C (98 °F) 72 16 (!) 194/92      Pulse Ox Temp src Heart Rate Source Patient Position   (!) 93 % -- -- --      BP Location FiO2 (%)     -- --       Physical Exam  Vitals and nursing note reviewed.   Constitutional:       Appearance: Normal appearance.   HENT:      Head: Normocephalic and atraumatic.      Mouth/Throat:      Mouth: Mucous membranes are moist.   Eyes:      Extraocular Movements: Extraocular movements intact.      Pupils: Pupils are equal, round, and reactive to light.   Cardiovascular:      Rate and Rhythm:  Normal rate and regular rhythm.      Heart sounds: No murmur heard.  Pulmonary:      Effort: Pulmonary effort is normal. No respiratory distress.      Breath sounds: Normal breath sounds.   Chest:      Chest wall: Tenderness (Right lateral chest wall) present.   Abdominal:      General: There is no distension.      Palpations: Abdomen is soft.      Tenderness: There is no abdominal tenderness.   Musculoskeletal:         General: No tenderness or deformity. Normal range of motion.      Right lower leg: No edema.      Left lower leg: No edema.   Skin:     General: Skin is warm and dry.      Findings: No lesion or rash.   Neurological:      General: No focal deficit present.      Mental Status: He is alert and oriented to person, place, and time.      Sensory: No sensory deficit.      Motor: No weakness.   Psychiatric:         Behavior: Behavior normal.       Labs Reviewed - No data to display  No orders to display     ED Course & MDM   Diagnoses as of 02/02/24 2308   Pain, chest wall       Medical Decision Making  Differentials include musculoskeletal pain, chest wall pain, shingles.  Imaging studies, if performed, were independently reviewed and interpreted by myself and confirmed by radiologist. EKG(s), if performed, were interpreted by myself.Reviewed his [facility] report and it looks like he [facility] yesterday.  I will give him a dose of morphine here.  I believe that this is musculoskeletal.  His vital signs were unremarkable.  I do not feel he requires any repeat imaging or blood work at this time.  He will need to [facility] with [facility].  I did review his skin and there is no new rashes noted.        Procedure  Procedures     Grayson Hernandez MD  02/02/24 3092

## 2024-02-15 ENCOUNTER — HOSPITAL ENCOUNTER (EMERGENCY)
Facility: HOSPITAL | Age: 74
Discharge: HOME | End: 2024-02-15
Attending: EMERGENCY MEDICINE
Payer: OTHER GOVERNMENT

## 2024-02-15 ENCOUNTER — APPOINTMENT (OUTPATIENT)
Dept: RADIOLOGY | Facility: HOSPITAL | Age: 74
End: 2024-02-15
Payer: OTHER GOVERNMENT

## 2024-02-15 ENCOUNTER — APPOINTMENT (OUTPATIENT)
Dept: CARDIOLOGY | Facility: HOSPITAL | Age: 74
End: 2024-02-15
Payer: OTHER GOVERNMENT

## 2024-02-15 VITALS
BODY MASS INDEX: 33.8 KG/M2 | TEMPERATURE: 97.7 F | DIASTOLIC BLOOD PRESSURE: 114 MMHG | SYSTOLIC BLOOD PRESSURE: 170 MMHG | RESPIRATION RATE: 16 BRPM | HEIGHT: 73 IN | OXYGEN SATURATION: 100 % | HEART RATE: 68 BPM | WEIGHT: 255 LBS

## 2024-02-15 DIAGNOSIS — U07.1 COVID: Primary | ICD-10-CM

## 2024-02-15 LAB
ANION GAP SERPL CALC-SCNC: 12 MMOL/L (ref 10–20)
BASOPHILS # BLD AUTO: 0.07 X10*3/UL (ref 0–0.1)
BASOPHILS NFR BLD AUTO: 0.6 %
BNP SERPL-MCNC: 26 PG/ML (ref 0–99)
BUN SERPL-MCNC: 18 MG/DL (ref 6–23)
CALCIUM SERPL-MCNC: 9.5 MG/DL (ref 8.6–10.3)
CARDIAC TROPONIN I PNL SERPL HS: 7 NG/L (ref 0–20)
CARDIAC TROPONIN I PNL SERPL HS: 7 NG/L (ref 0–20)
CHLORIDE SERPL-SCNC: 100 MMOL/L (ref 98–107)
CO2 SERPL-SCNC: 32 MMOL/L (ref 21–32)
CREAT SERPL-MCNC: 1.06 MG/DL (ref 0.5–1.3)
EGFRCR SERPLBLD CKD-EPI 2021: 75 ML/MIN/1.73M*2
EOSINOPHIL # BLD AUTO: 0.15 X10*3/UL (ref 0–0.4)
EOSINOPHIL NFR BLD AUTO: 1.3 %
ERYTHROCYTE [DISTWIDTH] IN BLOOD BY AUTOMATED COUNT: 14 % (ref 11.5–14.5)
FLUAV RNA RESP QL NAA+PROBE: NOT DETECTED
FLUBV RNA RESP QL NAA+PROBE: NOT DETECTED
GLUCOSE SERPL-MCNC: 97 MG/DL (ref 74–99)
HCT VFR BLD AUTO: 50.1 % (ref 41–52)
HGB BLD-MCNC: 17.1 G/DL (ref 13.5–17.5)
IMM GRANULOCYTES # BLD AUTO: 0.05 X10*3/UL (ref 0–0.5)
IMM GRANULOCYTES NFR BLD AUTO: 0.4 % (ref 0–0.9)
LYMPHOCYTES # BLD AUTO: 1.6 X10*3/UL (ref 0.8–3)
LYMPHOCYTES NFR BLD AUTO: 13.8 %
MCH RBC QN AUTO: 30.5 PG (ref 26–34)
MCHC RBC AUTO-ENTMCNC: 34.1 G/DL (ref 32–36)
MCV RBC AUTO: 89 FL (ref 80–100)
MONOCYTES # BLD AUTO: 0.91 X10*3/UL (ref 0.05–0.8)
MONOCYTES NFR BLD AUTO: 7.8 %
NEUTROPHILS # BLD AUTO: 8.84 X10*3/UL (ref 1.6–5.5)
NEUTROPHILS NFR BLD AUTO: 76.1 %
NRBC BLD-RTO: 0 /100 WBCS (ref 0–0)
PLATELET # BLD AUTO: 233 X10*3/UL (ref 150–450)
POTASSIUM SERPL-SCNC: 4.5 MMOL/L (ref 3.5–5.3)
RBC # BLD AUTO: 5.61 X10*6/UL (ref 4.5–5.9)
RSV RNA RESP QL NAA+PROBE: NOT DETECTED
SARS-COV-2 RNA RESP QL NAA+PROBE: DETECTED
SODIUM SERPL-SCNC: 139 MMOL/L (ref 136–145)
WBC # BLD AUTO: 11.6 X10*3/UL (ref 4.4–11.3)

## 2024-02-15 PROCEDURE — 85025 COMPLETE CBC W/AUTO DIFF WBC: CPT | Performed by: EMERGENCY MEDICINE

## 2024-02-15 PROCEDURE — 84484 ASSAY OF TROPONIN QUANT: CPT | Performed by: EMERGENCY MEDICINE

## 2024-02-15 PROCEDURE — 36415 COLL VENOUS BLD VENIPUNCTURE: CPT | Performed by: EMERGENCY MEDICINE

## 2024-02-15 PROCEDURE — 80048 BASIC METABOLIC PNL TOTAL CA: CPT | Performed by: EMERGENCY MEDICINE

## 2024-02-15 PROCEDURE — 2500000001 HC RX 250 WO HCPCS SELF ADMINISTERED DRUGS (ALT 637 FOR MEDICARE OP): Performed by: EMERGENCY MEDICINE

## 2024-02-15 PROCEDURE — 93005 ELECTROCARDIOGRAM TRACING: CPT

## 2024-02-15 PROCEDURE — 99284 EMERGENCY DEPT VISIT MOD MDM: CPT | Mod: 25

## 2024-02-15 PROCEDURE — 99285 EMERGENCY DEPT VISIT HI MDM: CPT | Mod: 25 | Performed by: EMERGENCY MEDICINE

## 2024-02-15 PROCEDURE — 87637 SARSCOV2&INF A&B&RSV AMP PRB: CPT | Performed by: EMERGENCY MEDICINE

## 2024-02-15 PROCEDURE — 2500000004 HC RX 250 GENERAL PHARMACY W/ HCPCS (ALT 636 FOR OP/ED): Performed by: EMERGENCY MEDICINE

## 2024-02-15 PROCEDURE — 83880 ASSAY OF NATRIURETIC PEPTIDE: CPT | Performed by: EMERGENCY MEDICINE

## 2024-02-15 PROCEDURE — 71045 X-RAY EXAM CHEST 1 VIEW: CPT | Mod: FOREIGN READ | Performed by: RADIOLOGY

## 2024-02-15 PROCEDURE — 96374 THER/PROPH/DIAG INJ IV PUSH: CPT

## 2024-02-15 PROCEDURE — 71045 X-RAY EXAM CHEST 1 VIEW: CPT

## 2024-02-15 RX ORDER — KETOROLAC TROMETHAMINE 30 MG/ML
15 INJECTION, SOLUTION INTRAMUSCULAR; INTRAVENOUS ONCE
Status: COMPLETED | OUTPATIENT
Start: 2024-02-15 | End: 2024-02-15

## 2024-02-15 RX ORDER — OXYCODONE AND ACETAMINOPHEN 5; 325 MG/1; MG/1
1 TABLET ORAL ONCE
Status: COMPLETED | OUTPATIENT
Start: 2024-02-15 | End: 2024-02-15

## 2024-02-15 RX ADMIN — KETOROLAC TROMETHAMINE 15 MG: 30 INJECTION, SOLUTION INTRAMUSCULAR at 18:56

## 2024-02-15 RX ADMIN — OXYCODONE HYDROCHLORIDE AND ACETAMINOPHEN 1 TABLET: 5; 325 TABLET ORAL at 16:41

## 2024-02-15 ASSESSMENT — LIFESTYLE VARIABLES
EVER FELT BAD OR GUILTY ABOUT YOUR DRINKING: NO
EVER HAD A DRINK FIRST THING IN THE MORNING TO STEADY YOUR NERVES TO GET RID OF A HANGOVER: NO
HAVE PEOPLE ANNOYED YOU BY CRITICIZING YOUR DRINKING: NO
HAVE YOU EVER FELT YOU SHOULD CUT DOWN ON YOUR DRINKING: NO

## 2024-02-15 ASSESSMENT — PAIN SCALES - GENERAL
PAINLEVEL_OUTOF10: 10 - WORST POSSIBLE PAIN

## 2024-02-15 ASSESSMENT — PAIN SCALES - PAIN ASSESSMENT IN ADVANCED DEMENTIA (PAINAD)
FACIALEXPRESSION: SMILING OR INEXPRESSIVE
CONSOLABILITY: NO NEED TO CONSOLE
BREATHING: OCCASIONAL LABORED BREATHING, SHORT PERIOD OF HYPERVENTILATION
BODYLANGUAGE: TENSE, DISTRESSED PACING, FIDGETING
BODYLANGUAGE: RELAXED
FACIALEXPRESSION: FACIAL GRIMACING
CONSOLABILITY: NO NEED TO CONSOLE
BREATHING: NORMAL
TOTALSCORE: 0

## 2024-02-15 ASSESSMENT — PAIN - FUNCTIONAL ASSESSMENT
PAIN_FUNCTIONAL_ASSESSMENT: 0-10

## 2024-02-15 ASSESSMENT — PAIN DESCRIPTION - ORIENTATION
ORIENTATION: ANTERIOR
ORIENTATION: ANTERIOR

## 2024-02-15 ASSESSMENT — PAIN DESCRIPTION - LOCATION
LOCATION: CHEST
LOCATION: CHEST

## 2024-02-15 ASSESSMENT — PAIN DESCRIPTION - DIRECTION: RADIATING_TOWARDS: ALL OVER

## 2024-02-15 NOTE — ED PROVIDER NOTES
HPI   Chief Complaint   Patient presents with   • Weakness, Gen     Pain all over x 3 days and weak       Donald Mcknight is a 73 y.o. male with a past medical history of COPD requiring 4 L via nasal cannula at home and hypertension presenting to the emergency department with shortness of breath and chest pain beginning approximately 3 weeks ago.  The patient states that the pain was mild and became progressively worse over time. The patient states that he feels as though he has COVID-19 and wants to be tested.  He states that he has had progressive shortness of breath which has not resolved since the last time he was in the emergency department and generalized chest pain which feels like a bruise.  He states that the pain is a 10/10.  He also endorses lightheadedness, weakness, and dizziness. He denies any nausea, vomiting, abdominal pain, headaches, or neurological symptoms. The patient denies any recent injury to his chest. He also states that he has not had to increase his home oxygen with the shortness of breath.                          No data recorded                   Patient History   No past medical history on file.  No past surgical history on file.  No family history on file.  Social History     Tobacco Use   • Smoking status: Former     Types: Cigarettes   • Smokeless tobacco: Never   Substance Use Topics   • Alcohol use: Not on file   • Drug use: Not on file       Physical Exam   ED Triage Vitals   Temperature Heart Rate Respirations BP   02/15/24 1556 02/15/24 1556 02/15/24 1556 02/15/24 1557   36.3 °C (97.4 °F) 96 16 (!) 151/99      Pulse Ox Temp src Heart Rate Source Patient Position   02/15/24 1556 -- 02/15/24 1556 02/15/24 1556   99 %  Monitor Sitting      BP Location FiO2 (%)     02/15/24 1556 --     Left arm        Physical Exam  Vitals and nursing note reviewed.   Constitutional:       General: He is not in acute distress.     Appearance: He is well-developed. He is obese.   HENT:      Head:  Normocephalic and atraumatic.      Right Ear: External ear normal.      Left Ear: External ear normal.      Nose: Nose normal.      Mouth/Throat:      Mouth: Mucous membranes are moist.      Pharynx: Oropharynx is clear.   Eyes:      Extraocular Movements: Extraocular movements intact.      Conjunctiva/sclera: Conjunctivae normal.      Pupils: Pupils are equal, round, and reactive to light.   Cardiovascular:      Rate and Rhythm: Normal rate and regular rhythm.      Pulses: Normal pulses.      Heart sounds: Normal heart sounds. No murmur heard.  Pulmonary:      Effort: Pulmonary effort is normal. No respiratory distress.      Breath sounds: Normal breath sounds.   Chest:      Chest wall: Tenderness present.   Abdominal:      General: Bowel sounds are normal.      Palpations: Abdomen is soft.      Tenderness: There is no abdominal tenderness.   Musculoskeletal:         General: No swelling. Normal range of motion.      Cervical back: Neck supple.   Skin:     General: Skin is warm and dry.      Capillary Refill: Capillary refill takes less than 2 seconds.   Neurological:      General: No focal deficit present.      Mental Status: He is alert and oriented to person, place, and time.      Motor: Weakness (generalized) present.   Psychiatric:         Mood and Affect: Mood normal.         ED Course & MDM   Diagnoses as of 02/15/24 1928   COVID       Medical Decision Making  Differential diagnoses include ACS, pulmonary embolism, pleural effusion, COVID-19, influenza, pneumonia, or musculoskeletal pain. EKG was ordered. Labs were ordered including CBC, BMP, Troponin, COVID/influenza PCR, and RSV PCR. Imaging was performed including chest x-ray.EKG was sinus rhythm with rate of 92.  No acute ischemia.  QTc 453, UT interval 172.  Laboratory studies show white count of 11.6, otherwise unremarkable.  Troponins are negative x 2.  His COVID test is positive.  Chest x-ray shows some scarring but no acute findings.  I feel that  the COVID is likely the cause of his symptoms.  He was given oxycodone and Toradol for his pain.  His pulse ox is 98% on his home oxygen.  Do not feel he requires admission at this time.  He was educated on supportive care at home.  He will continue his home pain medications and will follow-up with his PCP.        Procedure  Procedures     Grayson Hernandez MD  02/15/24 1928

## 2024-02-21 LAB
ATRIAL RATE: 92 BPM
P AXIS: 37 DEGREES
PR INTERVAL: 172 MS
Q ONSET: 253 MS
QRS COUNT: 15 BEATS
QRS DURATION: 88 MS
QT INTERVAL: 366 MS
QTC CALCULATION(BAZETT): 453 MS
QTC FREDERICIA: 422 MS
R AXIS: 5 DEGREES
T AXIS: 53 DEGREES
T OFFSET: 436 MS
VENTRICULAR RATE: 92 BPM

## 2024-02-27 ENCOUNTER — APPOINTMENT (OUTPATIENT)
Dept: RADIOLOGY | Facility: HOSPITAL | Age: 74
End: 2024-02-27
Payer: OTHER GOVERNMENT

## 2024-02-27 ENCOUNTER — HOSPITAL ENCOUNTER (EMERGENCY)
Facility: HOSPITAL | Age: 74
Discharge: HOME | End: 2024-02-27
Attending: EMERGENCY MEDICINE
Payer: OTHER GOVERNMENT

## 2024-02-27 ENCOUNTER — PHARMACY VISIT (OUTPATIENT)
Dept: PHARMACY | Facility: CLINIC | Age: 74
End: 2024-02-27

## 2024-02-27 ENCOUNTER — APPOINTMENT (OUTPATIENT)
Dept: CARDIOLOGY | Facility: HOSPITAL | Age: 74
End: 2024-02-27
Payer: OTHER GOVERNMENT

## 2024-02-27 VITALS
HEIGHT: 73 IN | HEART RATE: 91 BPM | WEIGHT: 265 LBS | DIASTOLIC BLOOD PRESSURE: 75 MMHG | OXYGEN SATURATION: 95 % | SYSTOLIC BLOOD PRESSURE: 119 MMHG | TEMPERATURE: 97.4 F | RESPIRATION RATE: 18 BRPM | BODY MASS INDEX: 35.12 KG/M2

## 2024-02-27 DIAGNOSIS — R07.89 ATYPICAL CHEST PAIN: ICD-10-CM

## 2024-02-27 DIAGNOSIS — K57.92 DIVERTICULITIS: Primary | ICD-10-CM

## 2024-02-27 PROBLEM — I10 ESSENTIAL (PRIMARY) HYPERTENSION: Status: ACTIVE | Noted: 2023-04-10

## 2024-02-27 PROBLEM — M17.12 OSTEOARTHRITIS OF LEFT KNEE: Status: ACTIVE | Noted: 2024-02-27

## 2024-02-27 LAB
ALBUMIN SERPL BCP-MCNC: 3.4 G/DL (ref 3.4–5)
ALP SERPL-CCNC: 77 U/L (ref 33–136)
ALT SERPL W P-5'-P-CCNC: 66 U/L (ref 10–52)
ANION GAP SERPL CALC-SCNC: 10 MMOL/L (ref 10–20)
AST SERPL W P-5'-P-CCNC: 31 U/L (ref 9–39)
BASOPHILS # BLD AUTO: 0.07 X10*3/UL (ref 0–0.1)
BASOPHILS NFR BLD AUTO: 0.7 %
BILIRUB SERPL-MCNC: 0.7 MG/DL (ref 0–1.2)
BNP SERPL-MCNC: 75 PG/ML (ref 0–99)
BUN SERPL-MCNC: 14 MG/DL (ref 6–23)
CALCIUM SERPL-MCNC: 8.8 MG/DL (ref 8.6–10.3)
CARDIAC TROPONIN I PNL SERPL HS: 11 NG/L (ref 0–20)
CARDIAC TROPONIN I PNL SERPL HS: 12 NG/L (ref 0–20)
CHLORIDE SERPL-SCNC: 98 MMOL/L (ref 98–107)
CO2 SERPL-SCNC: 36 MMOL/L (ref 21–32)
CREAT SERPL-MCNC: 1.02 MG/DL (ref 0.5–1.3)
EGFRCR SERPLBLD CKD-EPI 2021: 78 ML/MIN/1.73M*2
EOSINOPHIL # BLD AUTO: 0.21 X10*3/UL (ref 0–0.4)
EOSINOPHIL NFR BLD AUTO: 2.1 %
ERYTHROCYTE [DISTWIDTH] IN BLOOD BY AUTOMATED COUNT: 13.2 % (ref 11.5–14.5)
FLUAV RNA RESP QL NAA+PROBE: NOT DETECTED
FLUBV RNA RESP QL NAA+PROBE: NOT DETECTED
GLUCOSE SERPL-MCNC: 94 MG/DL (ref 74–99)
HCT VFR BLD AUTO: 43.9 % (ref 41–52)
HGB BLD-MCNC: 14.8 G/DL (ref 13.5–17.5)
IMM GRANULOCYTES # BLD AUTO: 0.04 X10*3/UL (ref 0–0.5)
IMM GRANULOCYTES NFR BLD AUTO: 0.4 % (ref 0–0.9)
LIPASE SERPL-CCNC: 16 U/L (ref 9–82)
LYMPHOCYTES # BLD AUTO: 1.39 X10*3/UL (ref 0.8–3)
LYMPHOCYTES NFR BLD AUTO: 13.6 %
MCH RBC QN AUTO: 29.8 PG (ref 26–34)
MCHC RBC AUTO-ENTMCNC: 33.7 G/DL (ref 32–36)
MCV RBC AUTO: 89 FL (ref 80–100)
MONOCYTES # BLD AUTO: 1.1 X10*3/UL (ref 0.05–0.8)
MONOCYTES NFR BLD AUTO: 10.8 %
NEUTROPHILS # BLD AUTO: 7.41 X10*3/UL (ref 1.6–5.5)
NEUTROPHILS NFR BLD AUTO: 72.4 %
NRBC BLD-RTO: 0 /100 WBCS (ref 0–0)
PLATELET # BLD AUTO: 227 X10*3/UL (ref 150–450)
POTASSIUM SERPL-SCNC: 3.4 MMOL/L (ref 3.5–5.3)
PROT SERPL-MCNC: 6.8 G/DL (ref 6.4–8.2)
RBC # BLD AUTO: 4.96 X10*6/UL (ref 4.5–5.9)
SARS-COV-2 RNA RESP QL NAA+PROBE: NOT DETECTED
SODIUM SERPL-SCNC: 141 MMOL/L (ref 136–145)
WBC # BLD AUTO: 10.2 X10*3/UL (ref 4.4–11.3)

## 2024-02-27 PROCEDURE — 80053 COMPREHEN METABOLIC PANEL: CPT | Performed by: PHYSICIAN ASSISTANT

## 2024-02-27 PROCEDURE — 83880 ASSAY OF NATRIURETIC PEPTIDE: CPT | Performed by: PHYSICIAN ASSISTANT

## 2024-02-27 PROCEDURE — 2500000001 HC RX 250 WO HCPCS SELF ADMINISTERED DRUGS (ALT 637 FOR MEDICARE OP): Performed by: PHYSICIAN ASSISTANT

## 2024-02-27 PROCEDURE — 94640 AIRWAY INHALATION TREATMENT: CPT

## 2024-02-27 PROCEDURE — 99285 EMERGENCY DEPT VISIT HI MDM: CPT | Mod: 25

## 2024-02-27 PROCEDURE — 71275 CT ANGIOGRAPHY CHEST: CPT

## 2024-02-27 PROCEDURE — 84484 ASSAY OF TROPONIN QUANT: CPT | Performed by: PHYSICIAN ASSISTANT

## 2024-02-27 PROCEDURE — 2550000001 HC RX 255 CONTRASTS: Performed by: PHYSICIAN ASSISTANT

## 2024-02-27 PROCEDURE — RXMED WILLOW AMBULATORY MEDICATION CHARGE

## 2024-02-27 PROCEDURE — 93005 ELECTROCARDIOGRAM TRACING: CPT

## 2024-02-27 PROCEDURE — 74177 CT ABD & PELVIS W/CONTRAST: CPT | Performed by: RADIOLOGY

## 2024-02-27 PROCEDURE — 71275 CT ANGIOGRAPHY CHEST: CPT | Performed by: RADIOLOGY

## 2024-02-27 PROCEDURE — 96374 THER/PROPH/DIAG INJ IV PUSH: CPT | Mod: 59

## 2024-02-27 PROCEDURE — 2500000002 HC RX 250 W HCPCS SELF ADMINISTERED DRUGS (ALT 637 FOR MEDICARE OP, ALT 636 FOR OP/ED): Performed by: PHYSICIAN ASSISTANT

## 2024-02-27 PROCEDURE — 85025 COMPLETE CBC W/AUTO DIFF WBC: CPT | Performed by: PHYSICIAN ASSISTANT

## 2024-02-27 PROCEDURE — 2500000004 HC RX 250 GENERAL PHARMACY W/ HCPCS (ALT 636 FOR OP/ED): Performed by: PHYSICIAN ASSISTANT

## 2024-02-27 PROCEDURE — 96375 TX/PRO/DX INJ NEW DRUG ADDON: CPT | Mod: 59

## 2024-02-27 PROCEDURE — 87636 SARSCOV2 & INF A&B AMP PRB: CPT | Performed by: PHYSICIAN ASSISTANT

## 2024-02-27 PROCEDURE — 74177 CT ABD & PELVIS W/CONTRAST: CPT

## 2024-02-27 PROCEDURE — 83690 ASSAY OF LIPASE: CPT | Performed by: PHYSICIAN ASSISTANT

## 2024-02-27 PROCEDURE — 36415 COLL VENOUS BLD VENIPUNCTURE: CPT | Performed by: PHYSICIAN ASSISTANT

## 2024-02-27 PROCEDURE — 96361 HYDRATE IV INFUSION ADD-ON: CPT

## 2024-02-27 RX ORDER — ONDANSETRON HYDROCHLORIDE 2 MG/ML
4 INJECTION, SOLUTION INTRAVENOUS ONCE
Status: COMPLETED | OUTPATIENT
Start: 2024-02-27 | End: 2024-02-27

## 2024-02-27 RX ORDER — CIPROFLOXACIN 500 MG/1
500 TABLET ORAL 2 TIMES DAILY
Qty: 14 TABLET | Refills: 0 | Status: SHIPPED | OUTPATIENT
Start: 2024-02-27 | End: 2024-03-20 | Stop reason: HOSPADM

## 2024-02-27 RX ORDER — NITROGLYCERIN 0.4 MG/1
0.4 TABLET SUBLINGUAL ONCE
Status: COMPLETED | OUTPATIENT
Start: 2024-02-27 | End: 2024-02-27

## 2024-02-27 RX ORDER — MORPHINE SULFATE 4 MG/ML
4 INJECTION, SOLUTION INTRAMUSCULAR; INTRAVENOUS ONCE
Status: COMPLETED | OUTPATIENT
Start: 2024-02-27 | End: 2024-02-27

## 2024-02-27 RX ORDER — DICYCLOMINE HYDROCHLORIDE 10 MG/1
20 CAPSULE ORAL 4 TIMES DAILY
Qty: 40 CAPSULE | Refills: 0 | Status: SHIPPED | OUTPATIENT
Start: 2024-02-27 | End: 2024-03-20 | Stop reason: HOSPADM

## 2024-02-27 RX ORDER — NAPROXEN SODIUM 220 MG/1
324 TABLET, FILM COATED ORAL ONCE
Status: COMPLETED | OUTPATIENT
Start: 2024-02-27 | End: 2024-02-27

## 2024-02-27 RX ORDER — IPRATROPIUM BROMIDE AND ALBUTEROL SULFATE 2.5; .5 MG/3ML; MG/3ML
3 SOLUTION RESPIRATORY (INHALATION) ONCE
Status: COMPLETED | OUTPATIENT
Start: 2024-02-27 | End: 2024-02-27

## 2024-02-27 RX ORDER — METRONIDAZOLE 500 MG/1
500 TABLET ORAL 2 TIMES DAILY
Qty: 14 TABLET | Refills: 0 | Status: SHIPPED | OUTPATIENT
Start: 2024-02-27 | End: 2024-03-20 | Stop reason: HOSPADM

## 2024-02-27 RX ORDER — CIPROFLOXACIN 500 MG/1
500 TABLET ORAL ONCE
Status: COMPLETED | OUTPATIENT
Start: 2024-02-27 | End: 2024-02-27

## 2024-02-27 RX ORDER — METRONIDAZOLE 250 MG/1
500 TABLET ORAL ONCE
Status: COMPLETED | OUTPATIENT
Start: 2024-02-27 | End: 2024-02-27

## 2024-02-27 RX ADMIN — ONDANSETRON 4 MG: 2 INJECTION INTRAMUSCULAR; INTRAVENOUS at 06:41

## 2024-02-27 RX ADMIN — NITROGLYCERIN 0.4 MG: 0.4 TABLET SUBLINGUAL at 06:39

## 2024-02-27 RX ADMIN — SODIUM CHLORIDE 1000 ML: 9 INJECTION, SOLUTION INTRAVENOUS at 06:37

## 2024-02-27 RX ADMIN — IPRATROPIUM BROMIDE AND ALBUTEROL SULFATE 3 ML: 2.5; .5 SOLUTION RESPIRATORY (INHALATION) at 06:42

## 2024-02-27 RX ADMIN — MORPHINE SULFATE 4 MG: 4 INJECTION, SOLUTION INTRAMUSCULAR; INTRAVENOUS at 06:38

## 2024-02-27 RX ADMIN — IOHEXOL 100 ML: 350 INJECTION, SOLUTION INTRAVENOUS at 07:00

## 2024-02-27 RX ADMIN — ASPIRIN 81 MG CHEWABLE TABLET 324 MG: 81 TABLET CHEWABLE at 06:42

## 2024-02-27 RX ADMIN — METRONIDAZOLE 500 MG: 250 TABLET ORAL at 10:42

## 2024-02-27 RX ADMIN — NITROGLYCERIN 0.4 MG: 0.4 TABLET SUBLINGUAL at 07:00

## 2024-02-27 RX ADMIN — CIPROFLOXACIN 500 MG: 500 TABLET ORAL at 10:41

## 2024-02-27 ASSESSMENT — LIFESTYLE VARIABLES
EVER FELT BAD OR GUILTY ABOUT YOUR DRINKING: NO
HAVE YOU EVER FELT YOU SHOULD CUT DOWN ON YOUR DRINKING: NO
HAVE PEOPLE ANNOYED YOU BY CRITICIZING YOUR DRINKING: NO
EVER HAD A DRINK FIRST THING IN THE MORNING TO STEADY YOUR NERVES TO GET RID OF A HANGOVER: NO

## 2024-02-27 ASSESSMENT — PAIN DESCRIPTION - PAIN TYPE: TYPE: ACUTE PAIN

## 2024-02-27 ASSESSMENT — PAIN SCALES - GENERAL
PAINLEVEL_OUTOF10: 8
PAINLEVEL_OUTOF10: 10 - WORST POSSIBLE PAIN

## 2024-02-27 ASSESSMENT — PAIN DESCRIPTION - DESCRIPTORS
DESCRIPTORS: ACHING
DESCRIPTORS: ACHING

## 2024-02-27 ASSESSMENT — PAIN - FUNCTIONAL ASSESSMENT
PAIN_FUNCTIONAL_ASSESSMENT: 0-10
PAIN_FUNCTIONAL_ASSESSMENT: 0-10

## 2024-02-27 ASSESSMENT — COLUMBIA-SUICIDE SEVERITY RATING SCALE - C-SSRS
2. HAVE YOU ACTUALLY HAD ANY THOUGHTS OF KILLING YOURSELF?: NO
6. HAVE YOU EVER DONE ANYTHING, STARTED TO DO ANYTHING, OR PREPARED TO DO ANYTHING TO END YOUR LIFE?: NO
1. IN THE PAST MONTH, HAVE YOU WISHED YOU WERE DEAD OR WISHED YOU COULD GO TO SLEEP AND NOT WAKE UP?: NO

## 2024-02-27 ASSESSMENT — PAIN DESCRIPTION - LOCATION
LOCATION: ABDOMEN
LOCATION: CHEST

## 2024-02-27 ASSESSMENT — PAIN DESCRIPTION - ORIENTATION: ORIENTATION: UPPER

## 2024-02-27 NOTE — ED PROVIDER NOTES
HPI   Chief Complaint   Patient presents with   • Chest Pain     X 4 days   • Shortness of Breath     X 2 weeks    • Diarrhea     X 2 days        History of present illness: 73-year-old male has multiple complaints.  Primarily complains of chest pain for the past 4 days.  Says the pain is midsternal, pressure-like, squeezing, moderate.  Patient states he was here 2 weeks ago for similar complaints and is hoping to get morphine because it helped him sleep for 1 day.  Patient also complains of some abdominal pain with diarrhea over the last4 days with 6-7 bowel movements daily.  His abdominal pain is in his left upper left lower quadrant and waxing and waning severity.  Has not nausea without vomiting.  Denies any dysuria polyuria.  Has associated rhinorrhea cough congestion.  Cough is unproductive.  Says that he has had increasing shortness of breath over the last 2 days.  Patient reports fall about a month ago with what he described as a rib fracture.  He was diagnosed with COVID-19 about 2 weeks ago.    Review of systems: Constitutional, eye, ENT, cardiovascular, respiratory, gastrointestinal, genitourinary, neurologic, musculoskeletal, dermatologic, hematologic, endocrine systems were evaluated and were negative unless otherwise specified in history of present illness.    Medications: Reviewed and per nursing note.    Family history: Denies relevant medical conditions.    Past medical history: Sleep apnea    Social history: History of cigarette smoking, recently quit.  Denies alcohol, drug use.      Physical exam:    Appearance: Well-developed, well-nourished, nontoxic-appearing, alert and oriented x3. Talking in complete sentences.    HEENT:  Head normocephalic atraumatic, extraocular movements intact, pupils equal round reactive to light, mucous membranes are moist and pink.    NECK:  Nml Inspection, no meningismus, no thyromegaly, no lymphadenopathy, no JVD, trachea is midline.    Respiratory: Slightly  reproducible chest wall tenderness.  Lung sounds tight bilaterally with expiratory wheezing.  No crackles.  No respiratory distress.    Cardiovascular: Regular rate and rhythm, no murmurs, rubs or gallops. Pulses 2+ symmetrically in the dorsalis pedis and radial pulses.    Abdomen/GI: Left upper quadrant left lower quadrant tenderness with no rebound guarding or rigidity.  Soft, protuberant, nondistended, normal bowel sounds x4. No masses or organomegaly.    :  No CVA tenderness    Neuro:  Oriented x 3, Speech Clear, cranial nerves grossly intact. Normal sensation to light touch in all 4 extremities.    Musculoskeletal: Patient spontaneously moves all 4 extremities.    Skin:  No cyanosis, clubbing, edema, open wounds, or rashes.                          Zahra Coma Scale Score: 15                     Patient History   No past medical history on file.  No past surgical history on file.  No family history on file.  Social History     Tobacco Use   • Smoking status: Former     Types: Cigarettes   • Smokeless tobacco: Never   Substance Use Topics   • Alcohol use: Not on file   • Drug use: Not on file       Physical Exam   ED Triage Vitals   Temperature Heart Rate Respirations BP   02/27/24 0607 02/27/24 0604 02/27/24 0604 02/27/24 0604   36.4 °C (97.5 °F) (!) 104 18 (!) 161/104      Pulse Ox Temp src Heart Rate Source Patient Position   02/27/24 0604 -- 02/27/24 0604 02/27/24 0604   97 %  Monitor Lying      BP Location FiO2 (%)     02/27/24 0604 --     Left arm        Physical Exam    ED Course & MDM   Diagnoses as of 02/27/24 1002   Diverticulitis   Atypical chest pain       Medical Decision Making  EKG: Sinus rhythm at a rate of 96 bpm with no ST segment elevation or ectopy.  NV interval 167 with a QTc 458.  No clear acute ischemia.  This interpreted by myself.    Labs Reviewed  CBC WITH AUTO DIFFERENTIAL - Abnormal     WBC                           10.2                   nRBC                          0.0                     RBC                           4.96                   Hemoglobin                    14.8                   Hematocrit                    43.9                   MCV                           89                     MCH                           29.8                   MCHC                          33.7                   RDW                           13.2                   Platelets                     227                    Neutrophils %                 72.4                   Immature Granulocytes %, Automated   0.4                    Lymphocytes %                 13.6                   Monocytes %                   10.8                   Eosinophils %                 2.1                    Basophils %                   0.7                    Neutrophils Absolute          7.41 (*)               Immature Granulocytes Absolute, Au*   0.04                   Lymphocytes Absolute          1.39                   Monocytes Absolute            1.10 (*)               Eosinophils Absolute          0.21                   Basophils Absolute            0.07                COMPREHENSIVE METABOLIC PANEL - Abnormal     Glucose                       94                     Sodium                        141                    Potassium                     3.4 (*)                Chloride                      98                     Bicarbonate                   36 (*)                 Anion Gap                     10                     Urea Nitrogen                 14                     Creatinine                    1.02                   eGFR                          78                     Calcium                       8.8                    Albumin                       3.4                    Alkaline Phosphatase          77                     Total Protein                 6.8                    AST                           31                     Bilirubin, Total              0.7                    ALT                            66 (*)              LIPASE - Normal     Lipase                        16                         Narrative: Venipuncture immediately after or during the administration of Metamizole may lead to falsely low results. Testing should be performed immediately prior to Metamizole dosing.  B-TYPE NATRIURETIC PEPTIDE - Normal     BNP                           75                         Narrative:    <100 pg/mL - Heart failure unlikely                  100-299 pg/mL - Intermediate probability of acute heart                                  failure exacerbation. Correlate with clinical                                  context and patient history.                    >=300 pg/mL - Heart Failure likely. Correlate with clinical                                  context and patient history.                                    BNP testing is performed using different testing methodology at [facility] than at other [facility]. Direct result comparisons should only be made within the same method.                     SARS-COV-2 AND INFLUENZA A/B PCR - Normal     Flu A Result                                         Flu B Result                                         Coronavirus 2019, PCR                                    Narrative: This assay has received FDA Emergency Use Authorization (EUA) and  is only authorized for the duration of time that circumstances exist to justify the authorization of the emergency use of in vitro diagnostic tests for the detection of SARS-CoV-2 virus and/or diagnosis of COVID-19 infection under section 564(b)(1) of the Act, 21 U.S.C. 360bbb-3(b)(1). Testing for SARS-CoV-2 is only recommended for patients who meet current clinical and/or epidemiological criteria as defined by federal, state, or local public health directives. This assay is an in vitro diagnostic nucleic acid amplification test for the qualitative detection of SARS-CoV-2, Influenza A, and Influenza B from nasopharyngeal  specimens and has been validated for use at [facility]. Negative results do not preclude COVID-19 infections or Influenza A/B infections, and should not be used as the sole basis for diagnosis, treatment, or other management decisions. If Influenza A/B and RSV PCR results are negative, testing for Parainfluenza virus, Adenovirus and Metapneumovirus is routinely performed for [facility] pediatric oncology and intensive care inpatients, and is available on other patients by placing an add-on request.   SERIAL TROPONIN-INITIAL - Normal     Troponin I, High Sensitivity   12                         Narrative: Less than 99th percentile of normal range cutoff-                  Female and children under 18 years old <14 ng/L; Male <21 ng/L: Negative                  Repeat testing should be performed if clinically indicated.                                     Female and children under 18 years old 14-50 ng/L; Male 21-50 ng/L:                  Consistent with possible cardiac damage and possible increased clinical                   risk. Serial measurements may help to assess extent of myocardial damage.                                     >50 ng/L: Consistent with cardiac damage, increased clinical risk and                  myocardial infarction. Serial measurements may help assess extent of                   myocardial damage.                                      NOTE: Children less than 1 year old may have higher baseline troponin                   levels and results should be interpreted in conjunction with the overall                   clinical context.                                     NOTE: Troponin I testing is performed using a different                   testing methodology at [facility] than at other                   [facility]. Direct result comparisons should only                   be made within the same method.  SERIAL TROPONIN, 1 HOUR - Normal     Troponin I, High  Sensitivity   11                         Narrative: Less than 99th percentile of normal range cutoff-                  Female and children under 18 years old <14 ng/L; Male <21 ng/L: Negative                  Repeat testing should be performed if clinically indicated.                                     Female and children under 18 years old 14-50 ng/L; Male 21-50 ng/L:                  Consistent with possible cardiac damage and possible increased clinical                   risk. Serial measurements may help to assess extent of myocardial damage.                                     >50 ng/L: Consistent with cardiac damage, increased clinical risk and                  myocardial infarction. Serial measurements may help assess extent of                   myocardial damage.                                      NOTE: Children less than 1 year old may have higher baseline troponin                   levels and results should be interpreted in conjunction with the overall                   clinical context.                                     NOTE: Troponin I testing is performed using a different                   testing methodology at [facility] than at other                   [facility]. Direct result comparisons should only                   be made within the same method.  TROPONIN SERIES- (INITIAL, 1 HR)         Narrative: The following orders were created for panel order Troponin Series, (0, 1 HR).                  Procedure                               Abnormality         Status                                     ---------                               -----------         ------                                     Troponin I, High Sensiti...[[id number]]  Normal              Final result                               Troponin, High Sensitivi...[[id number]]  Normal              Final result                                                 Please view results for these tests on the individual  orders.  URINALYSIS WITH REFLEX CULTURE AND MICROSCOPIC         Narrative: The following orders were created for panel order Urinalysis with Reflex Culture and Microscopic.                  Procedure                               Abnormality         Status                                     ---------                               -----------         ------                                     Urinalysis with Reflex C...[[id number]]                                                                 Extra Urine Gray Tube[[id number]]                                                                                         Please view results for these tests on the individual orders.  URINALYSIS WITH REFLEX CULTURE AND MICROSCOPIC  EXTRA URINE GRAY TUBE    CT abdomen pelvis w IV contrast   Final Result    Cholelithiasis.          Nodularity of the adrenal glands is similar to 01/08/2018.          Distal colonic diverticulosis. Very mild component of diverticulitis    may be present in the distal descending colon at the level of the    left lower quadrant. There is no abscess.          The remainder of the abdomen and pelvis is unremarkable.          Signed by: Julián Parks 2/27/2024 9:01 AM    Dictation workstation:   [id number]     CT angio chest for pulmonary embolism   Final Result    No sign of pulmonary embolism allowing for poor opacification of the    upper lobe segmental pulmonary arteries.          Normal thoracic aorta.          Diffuse atherosclerotic arterial calcifications of the coronary    arteries.          Mild emphysematous changes similar to 01/27/2024.          A 4 mm subpleural nodular density is identified laterally in the    right upper lobe which is new from 2010. This finding is stable from    the most recent study of 01/27/2024. Incidental Finding:  A    non-calcified pulmonary nodule/multiple non-calcified pulmonary    nodules measuring less than 6 mm, likely benign.  (**-YCF-**)           Instructions:  No further follow-up is required, however, if the    patient has high risk factors for primary lung malignancy, follow-up    noncontrast CT scan chest in 12 months may be obtained. (Daniel Lehman et al., Guidelines for management of incidental pulmonary    nodules detected on CT images: From the Fleischner Society 2017,    Radiology. 2017 Jul;284 (1):228-243.) [email or web address]          No airspace consolidation.          Small hiatal hernia.          Signed by: Julián Parks 2/27/2024 8:56 AM    Dictation workstation:   [id number]         Patient presents with complaints of chest pain shortness of breath abdominal pain and diarrhea.  Differential diagnosis of viral syndrome, gastroenteritis, bronchitis, pneumonia, pneumothorax, COVID-19, pulmonary embolism, acute coronary syndrome, pancreatitis, diverticulitis, appendicitis.  Examination shows expiratory wheezing reproducible chest wall tenderness and abdominal tenderness.    CBC CMP lipase EKG troponin urinalysis CT PE study, CT abdomen pelvis ordered.  Given aspirin nitroglycerin and DuoNeb treatments.    Labs show Normal white blood cell count hemoglobin hematocrit.  Chemistries slightly low potassium 3.4 and bicarb 36 with normal renal function.  Minimally elevated ALT.  Troponin negative x 2.  Flu and COVID-negative.  CT PE study shows no evidence of pulmonary embolism.  There are some nodules with recommendation for follow-up in 12 months.  CT and pelvis shows distal colonic diverticulosis with diverticulitis.    Patient's chest pain was not better with nitroglycerin.  His pain did improve with morphine.  He was found sleeping.  Serial abdominal exam was performed at 950 revealing no significant tenderness.  He will be treated outpatient for diverticulitis with Cipro Flagyl and Bentyl.  First doses of antibiotic given in the emergency department with order for med to bed.    Patient will be discharged to home with prescription.   Patient is educated in signs and symptoms of worsening symptoms and reasons to come back to the emergency department.  Will need to follow up with primary care provider.  Patient does not report social determinants of health impacting ability to obtain care that is needed.  Patient agrees with plan.    This is a transcription.  Text was reviewed for errors, but some transcription errors may remain.  Please call for any questions.              Procedure  Procedures     Timur Leos PA-C  02/27/24 1003       Timur Leos PA-C  02/27/24 1003

## 2024-03-02 ENCOUNTER — APPOINTMENT (OUTPATIENT)
Dept: RADIOLOGY | Facility: HOSPITAL | Age: 74
End: 2024-03-02
Payer: MEDICARE

## 2024-03-02 ENCOUNTER — APPOINTMENT (OUTPATIENT)
Dept: CARDIOLOGY | Facility: HOSPITAL | Age: 74
End: 2024-03-02
Payer: MEDICARE

## 2024-03-02 ENCOUNTER — HOSPITAL ENCOUNTER (EMERGENCY)
Facility: HOSPITAL | Age: 74
Discharge: OTHER INSTITUTION | End: 2024-03-03
Attending: STUDENT IN AN ORGANIZED HEALTH CARE EDUCATION/TRAINING PROGRAM
Payer: MEDICARE

## 2024-03-02 DIAGNOSIS — R07.89 CHEST WALL PAIN: Primary | ICD-10-CM

## 2024-03-02 LAB
ALBUMIN SERPL BCP-MCNC: 3.4 G/DL (ref 3.4–5)
ALP SERPL-CCNC: 67 U/L (ref 33–136)
ALT SERPL W P-5'-P-CCNC: 67 U/L (ref 10–52)
ANION GAP SERPL CALC-SCNC: 9 MMOL/L
AST SERPL W P-5'-P-CCNC: 46 U/L (ref 9–39)
BASOPHILS # BLD AUTO: 0.07 X10*3/UL (ref 0–0.1)
BASOPHILS NFR BLD AUTO: 0.8 %
BILIRUB SERPL-MCNC: 0.6 MG/DL (ref 0–1.2)
BUN SERPL-MCNC: 15 MG/DL (ref 6–23)
CALCIUM SERPL-MCNC: 8.5 MG/DL (ref 8.6–10.3)
CARDIAC TROPONIN I PNL SERPL HS: 17 NG/L (ref 0–20)
CARDIAC TROPONIN I PNL SERPL HS: 18 NG/L (ref 0–20)
CHLORIDE SERPL-SCNC: 98 MMOL/L (ref 98–107)
CO2 SERPL-SCNC: 35 MMOL/L (ref 21–32)
CREAT SERPL-MCNC: 1.14 MG/DL (ref 0.5–1.3)
EGFRCR SERPLBLD CKD-EPI 2021: 68 ML/MIN/1.73M*2
EOSINOPHIL # BLD AUTO: 0.27 X10*3/UL (ref 0–0.4)
EOSINOPHIL NFR BLD AUTO: 3.2 %
ERYTHROCYTE [DISTWIDTH] IN BLOOD BY AUTOMATED COUNT: 13.6 % (ref 11.5–14.5)
GLUCOSE SERPL-MCNC: 90 MG/DL (ref 74–99)
HCT VFR BLD AUTO: 46 % (ref 41–52)
HGB BLD-MCNC: 15.9 G/DL (ref 13.5–17.5)
IMM GRANULOCYTES # BLD AUTO: 0.03 X10*3/UL (ref 0–0.5)
IMM GRANULOCYTES NFR BLD AUTO: 0.4 % (ref 0–0.9)
LYMPHOCYTES # BLD AUTO: 1.14 X10*3/UL (ref 0.8–3)
LYMPHOCYTES NFR BLD AUTO: 13.5 %
MAGNESIUM SERPL-MCNC: 1.82 MG/DL (ref 1.6–2.4)
MCH RBC QN AUTO: 30.6 PG (ref 26–34)
MCHC RBC AUTO-ENTMCNC: 34.6 G/DL (ref 32–36)
MCV RBC AUTO: 89 FL (ref 80–100)
MONOCYTES # BLD AUTO: 0.94 X10*3/UL (ref 0.05–0.8)
MONOCYTES NFR BLD AUTO: 11.1 %
NEUTROPHILS # BLD AUTO: 6 X10*3/UL (ref 1.6–5.5)
NEUTROPHILS NFR BLD AUTO: 71 %
NRBC BLD-RTO: 0 /100 WBCS (ref 0–0)
PLATELET # BLD AUTO: 235 X10*3/UL (ref 150–450)
POTASSIUM SERPL-SCNC: 2.8 MMOL/L (ref 3.5–5.3)
PROT SERPL-MCNC: 6.9 G/DL (ref 6.4–8.2)
RBC # BLD AUTO: 5.2 X10*6/UL (ref 4.5–5.9)
SODIUM SERPL-SCNC: 139 MMOL/L (ref 136–145)
WBC # BLD AUTO: 8.5 X10*3/UL (ref 4.4–11.3)

## 2024-03-02 PROCEDURE — 36415 COLL VENOUS BLD VENIPUNCTURE: CPT | Performed by: STUDENT IN AN ORGANIZED HEALTH CARE EDUCATION/TRAINING PROGRAM

## 2024-03-02 PROCEDURE — 83735 ASSAY OF MAGNESIUM: CPT | Performed by: STUDENT IN AN ORGANIZED HEALTH CARE EDUCATION/TRAINING PROGRAM

## 2024-03-02 PROCEDURE — 70450 CT HEAD/BRAIN W/O DYE: CPT

## 2024-03-02 PROCEDURE — 2550000001 HC RX 255 CONTRASTS: Performed by: STUDENT IN AN ORGANIZED HEALTH CARE EDUCATION/TRAINING PROGRAM

## 2024-03-02 PROCEDURE — 74174 CTA ABD&PLVS W/CONTRAST: CPT | Performed by: RADIOLOGY

## 2024-03-02 PROCEDURE — 96374 THER/PROPH/DIAG INJ IV PUSH: CPT | Mod: 59

## 2024-03-02 PROCEDURE — 93005 ELECTROCARDIOGRAM TRACING: CPT

## 2024-03-02 PROCEDURE — 71045 X-RAY EXAM CHEST 1 VIEW: CPT

## 2024-03-02 PROCEDURE — 71275 CT ANGIOGRAPHY CHEST: CPT | Performed by: RADIOLOGY

## 2024-03-02 PROCEDURE — 2500000002 HC RX 250 W HCPCS SELF ADMINISTERED DRUGS (ALT 637 FOR MEDICARE OP, ALT 636 FOR OP/ED): Performed by: STUDENT IN AN ORGANIZED HEALTH CARE EDUCATION/TRAINING PROGRAM

## 2024-03-02 PROCEDURE — 2500000004 HC RX 250 GENERAL PHARMACY W/ HCPCS (ALT 636 FOR OP/ED): Performed by: STUDENT IN AN ORGANIZED HEALTH CARE EDUCATION/TRAINING PROGRAM

## 2024-03-02 PROCEDURE — 71275 CT ANGIOGRAPHY CHEST: CPT

## 2024-03-02 PROCEDURE — 82077 ASSAY SPEC XCP UR&BREATH IA: CPT | Performed by: STUDENT IN AN ORGANIZED HEALTH CARE EDUCATION/TRAINING PROGRAM

## 2024-03-02 PROCEDURE — 85025 COMPLETE CBC W/AUTO DIFF WBC: CPT | Performed by: STUDENT IN AN ORGANIZED HEALTH CARE EDUCATION/TRAINING PROGRAM

## 2024-03-02 PROCEDURE — 82947 ASSAY GLUCOSE BLOOD QUANT: CPT | Performed by: STUDENT IN AN ORGANIZED HEALTH CARE EDUCATION/TRAINING PROGRAM

## 2024-03-02 PROCEDURE — 71045 X-RAY EXAM CHEST 1 VIEW: CPT | Mod: FOREIGN READ | Performed by: RADIOLOGY

## 2024-03-02 PROCEDURE — 84484 ASSAY OF TROPONIN QUANT: CPT | Performed by: STUDENT IN AN ORGANIZED HEALTH CARE EDUCATION/TRAINING PROGRAM

## 2024-03-02 PROCEDURE — 2500000005 HC RX 250 GENERAL PHARMACY W/O HCPCS: Performed by: STUDENT IN AN ORGANIZED HEALTH CARE EDUCATION/TRAINING PROGRAM

## 2024-03-02 PROCEDURE — 70450 CT HEAD/BRAIN W/O DYE: CPT | Performed by: RADIOLOGY

## 2024-03-02 PROCEDURE — 99285 EMERGENCY DEPT VISIT HI MDM: CPT | Mod: 25

## 2024-03-02 RX ORDER — ACETAMINOPHEN 325 MG/1
650 TABLET ORAL ONCE
Status: COMPLETED | OUTPATIENT
Start: 2024-03-02 | End: 2024-03-02

## 2024-03-02 RX ORDER — LIDOCAINE 560 MG/1
1 PATCH PERCUTANEOUS; TOPICAL; TRANSDERMAL ONCE
Status: COMPLETED | OUTPATIENT
Start: 2024-03-02 | End: 2024-03-03

## 2024-03-02 RX ORDER — POTASSIUM CHLORIDE 750 MG/1
40 TABLET, FILM COATED, EXTENDED RELEASE ORAL ONCE
Status: COMPLETED | OUTPATIENT
Start: 2024-03-02 | End: 2024-03-02

## 2024-03-02 RX ORDER — KETOROLAC TROMETHAMINE 30 MG/ML
15 INJECTION, SOLUTION INTRAMUSCULAR; INTRAVENOUS ONCE
Status: COMPLETED | OUTPATIENT
Start: 2024-03-02 | End: 2024-03-02

## 2024-03-02 RX ADMIN — IOHEXOL 100 ML: 350 INJECTION, SOLUTION INTRAVENOUS at 17:09

## 2024-03-02 RX ADMIN — POTASSIUM CHLORIDE 40 MEQ: 750 TABLET, FILM COATED, EXTENDED RELEASE ORAL at 16:06

## 2024-03-02 RX ADMIN — LIDOCAINE 1 PATCH: 4 PATCH TOPICAL at 16:54

## 2024-03-02 RX ADMIN — ACETAMINOPHEN 650 MG: 325 TABLET ORAL at 16:54

## 2024-03-02 RX ADMIN — KETOROLAC TROMETHAMINE 15 MG: 30 INJECTION, SOLUTION INTRAMUSCULAR at 19:44

## 2024-03-02 SDOH — HEALTH STABILITY: MENTAL HEALTH: SUICIDAL BEHAVIOR (LIFETIME): NO

## 2024-03-02 SDOH — ECONOMIC STABILITY: HOUSING INSECURITY: FEELS SAFE LIVING IN HOME: YES

## 2024-03-02 SDOH — HEALTH STABILITY: MENTAL HEALTH: WISH TO BE DEAD (PAST 1 MONTH): YES

## 2024-03-02 SDOH — HEALTH STABILITY: MENTAL HEALTH: ACTIVE SUICIDAL IDEATION WITH SPECIFIC PLAN AND INTENT (PAST 1 MONTH): YES

## 2024-03-02 SDOH — HEALTH STABILITY: MENTAL HEALTH: IN THE PAST WEEK, HAVE YOU BEEN HAVING THOUGHTS ABOUT KILLING YOURSELF?: YES

## 2024-03-02 SDOH — HEALTH STABILITY: MENTAL HEALTH: ARE YOU HAVING THOUGHTS OF KILLING YOURSELF RIGHT NOW?: NO

## 2024-03-02 SDOH — HEALTH STABILITY: MENTAL HEALTH
DEPRESSION SYMPTOMS: APPETITE CHANGE;CHANGE IN ENERGY LEVEL;FEELINGS OF HELPLESSNESS;FEELINGS OF HOPELESSESS;IMPAIRED CONCENTRATION;SLEEP DISTURBANCE

## 2024-03-02 SDOH — HEALTH STABILITY: MENTAL HEALTH: HAVE YOU EVER TRIED TO KILL YOURSELF?: NO

## 2024-03-02 SDOH — HEALTH STABILITY: MENTAL HEALTH: ANXIETY SYMPTOMS: NO PROBLEMS REPORTED OR OBSERVED.

## 2024-03-02 SDOH — HEALTH STABILITY: MENTAL HEALTH: IN THE PAST FEW WEEKS, HAVE YOU WISHED YOU WERE DEAD?: YES

## 2024-03-02 SDOH — HEALTH STABILITY: MENTAL HEALTH: IN THE PAST FEW WEEKS, HAVE YOU FELT THAT YOU OR YOUR FAMILY WOULD BE BETTER OFF IF YOU WERE DEAD?: YES

## 2024-03-02 SDOH — HEALTH STABILITY: MENTAL HEALTH: NON-SPECIFIC ACTIVE SUICIDAL THOUGHTS (PAST 1 MONTH): YES

## 2024-03-02 SDOH — HEALTH STABILITY: MENTAL HEALTH: ACTIVE SUICIDAL IDEATION WITH SOME INTENT TO ACT, WITHOUT SPECIFIC PLAN (PAST 1 MONTH): NO

## 2024-03-02 ASSESSMENT — PAIN SCALES - GENERAL
PAINLEVEL_OUTOF10: 8
PAINLEVEL_OUTOF10: 10 - WORST POSSIBLE PAIN
PAINLEVEL_OUTOF10: 10 - WORST POSSIBLE PAIN

## 2024-03-02 ASSESSMENT — LIFESTYLE VARIABLES
EVER FELT BAD OR GUILTY ABOUT YOUR DRINKING: NO
SUBSTANCE_ABUSE_PAST_12_MONTHS: YES
HAVE YOU EVER FELT YOU SHOULD CUT DOWN ON YOUR DRINKING: NO
HAVE PEOPLE ANNOYED YOU BY CRITICIZING YOUR DRINKING: NO
PRESCIPTION_ABUSE_PAST_12_MONTHS: NO
EVER HAD A DRINK FIRST THING IN THE MORNING TO STEADY YOUR NERVES TO GET RID OF A HANGOVER: NO

## 2024-03-02 ASSESSMENT — PAIN DESCRIPTION - ORIENTATION: ORIENTATION: LEFT

## 2024-03-02 ASSESSMENT — PAIN DESCRIPTION - DESCRIPTORS
DESCRIPTORS: ACHING;DULL
DESCRIPTORS: ACHING;DULL
DESCRIPTORS: DULL;ACHING

## 2024-03-02 ASSESSMENT — PAIN - FUNCTIONAL ASSESSMENT
PAIN_FUNCTIONAL_ASSESSMENT: 0-10
PAIN_FUNCTIONAL_ASSESSMENT: 0-10

## 2024-03-02 ASSESSMENT — PAIN DESCRIPTION - LOCATION: LOCATION: CHEST

## 2024-03-02 ASSESSMENT — PAIN DESCRIPTION - PROGRESSION: CLINICAL_PROGRESSION: NOT CHANGED

## 2024-03-02 ASSESSMENT — PAIN DESCRIPTION - PAIN TYPE: TYPE: ACUTE PAIN

## 2024-03-02 NOTE — PROGRESS NOTES
This patient was seen by the offgoing provider.  Please see their note for full history and physical exam.    Briefly, this is a 73 y.o. male presenting to the ED with chest pain.  He had lab work and CT imaging performed.  At the time of signout, patient is pending CT imaging and reevaluation.    On my evaluation, patient is hemodynamically stable.  He is fairly agitated and requesting pain medicine.  CT imaging Showed no acute abnormality in the chest abdomen or pelvis.  At this point in time it is unclear the cause of patient's pain.  I did discuss these findings with the patient and patient's family.  Patient's family is concerned that he may have been taking pain medicine at home and that he may be withdrawing from pain medicine.  They noted that he has not been sleeping and he is very jittery and anxious.  When I discussed with the patient and his family that we had no clear medical cause for his continued pain, patient noted that he wanted to kill himself with a knife because he did not want to be in this pain anymore.  Patient noted feeling very serious about these actions and thoughts and I subsequently [facility].  EPAT did discuss the patient with his family and himself and they are very concerned for possible dementia as he has had a significant change in his behavior over the past month and has stopped taking care of himself.  EPAT recommended inpatient psychiatric treatment.  Patient care was signed out to the oncoming provider in stable condition pending placement by EPAT.    Diagnoses as of 03/02/24 2343   Chest wall pain         Chiqui Koroma MD  Emergency Medicine

## 2024-03-02 NOTE — ED PROVIDER NOTES
"Chief Complaint   Patient presents with    Chest Pain     Ongoing for 2.5 weeks. L sided pain radiating to back, neck and L arm. + SOB. Took aleve without relief. Reports hx of rib fractures 6 months go from fall, this pain is different        HPI:  73 y.o. male with a history of recent rib fractures, recently diagnosed COVID-19  who who is presenting to the ED with chest pain.  Patient reports he has had constant chest pain radiating from the left anterior chest to the back for the past 6 weeks.  It is worse with movement or lying on that side.  He reports associated shortness of breath.  No nausea or vomiting but patient has been refusing to eat. He has attempted to treat his pain at home with Aleve and family also is concerned that he may be using THC to treat the pain.  They are also concerned that he may be \"hallucinating\" due to him saying odd or off-the-wall things though he has not actually showing any signs of having visual or auditory hallucinations.    History provided by: patient  Limitations to history: none    ------------------------------------------------------------------------------------------------------------------------------------------    ED Triage Vitals [03/02/24 1420]   Temperature Heart Rate Respirations BP   36.2 °C (97.2 °F) 86 16 (!) 149/96      Pulse Ox Temp Source Heart Rate Source Patient Position   97 % Temporal Monitor --      BP Location FiO2 (%)     -- --         Physical Exam:  Triage vitals reviewed.  Constitutional: Well developed adult, conversant but will intermittently scream out   Head: Normocephalic, atraumatic  Skin: Intact, dry. No rashes or lesions.  Eyes: Pupils are equal. No conjunctival injection.  Neck: Supple. Trachea is midline.  Pulmonary: Normal work of breathing with no accessory muscle use noted.  Generally diminished breath sounds bilaterally. No wheezing, rhonchi.  Cardiovascular: RRR. 2+ radial pulses bilaterally. Reproducible L rib pain. No calf edema " "or asymmetry.  Abdomen: Soft, nondistended. LUQ/epigastric tenderness to palpation.  Extremities: No gross deformities.  Moving all extremities spontaneously without difficulty.  Neuro: Awake and alert. Face is symmetric.  Speech is clear. No obvious focal findings.    EKG interpreted by me.  Sinus rhythm, rate of 87 bpm, no acute ST elevations or depressions.  No acute changes in morphology from prior.  Completed 1413.    ------------------------------------------------------------------------------------------------------------------------------------------    Medical Decision Making:  This patient is a 73 y.o. male with a history of recent rib fractures, recently diagnosed COVID-19  who who is presenting to the ED with constant chest pain for 6 weeks.  On arrival, patient intermittent yelling out screaming that he needs medications for his pain but when discussing his symptoms he is calm and in no distress. Pain is reproducible on exam.     Prior ED records reviewed showing multiple ED visits for these symptoms. Has had negative CT PE x2 and CT abdomen pelvis. Multiple CXRs that were unremarkable. EKG and troponin has been repeatedly negative.     Given his distress, CT angio ordered to rule out dissection. Given length of symptoms, lower suspicion for unstable angina/ACS. EKG also unchanged from prior. Troponin negative. Family did express concern over behavior changes regarding his pain and that he was \"hallucinating\", so CT head ordered.     Signed out to oncoming physician pending CTA and reevaluation, as well as final disposition.      Diagnoses as of 03/28/24 2014   Chest wall pain        Clinical Impression:  Chest pain[    Disposition:  Pending    Keshia Paulino DO  Emergency Medicine         Keshia Paulino DO  03/28/24 2023    "

## 2024-03-03 ENCOUNTER — HOSPITAL ENCOUNTER (INPATIENT)
Facility: HOSPITAL | Age: 74
LOS: 17 days | Discharge: NURSING, REHAB OR LONG-TERM CARE | DRG: 884 | End: 2024-03-20
Attending: PSYCHIATRY & NEUROLOGY | Admitting: PSYCHIATRY & NEUROLOGY
Payer: MEDICARE

## 2024-03-03 VITALS
SYSTOLIC BLOOD PRESSURE: 160 MMHG | WEIGHT: 257.94 LBS | OXYGEN SATURATION: 94 % | HEART RATE: 84 BPM | RESPIRATION RATE: 21 BRPM | DIASTOLIC BLOOD PRESSURE: 88 MMHG | BODY MASS INDEX: 34.19 KG/M2 | HEIGHT: 73 IN | TEMPERATURE: 97.2 F

## 2024-03-03 DIAGNOSIS — K21.9 GASTROESOPHAGEAL REFLUX DISEASE WITHOUT ESOPHAGITIS: ICD-10-CM

## 2024-03-03 DIAGNOSIS — F32.A DEPRESSION WITH SUICIDAL IDEATION: ICD-10-CM

## 2024-03-03 DIAGNOSIS — R07.9 CHEST PAIN, UNSPECIFIED TYPE: ICD-10-CM

## 2024-03-03 DIAGNOSIS — I10 ESSENTIAL (PRIMARY) HYPERTENSION: ICD-10-CM

## 2024-03-03 DIAGNOSIS — F03.B18 MODERATE DEMENTIA WITH OTHER BEHAVIORAL DISTURBANCE, UNSPECIFIED DEMENTIA TYPE (MULTI): ICD-10-CM

## 2024-03-03 DIAGNOSIS — I10 PRIMARY HYPERTENSION: ICD-10-CM

## 2024-03-03 DIAGNOSIS — K81.1 CHRONIC CHOLECYSTITIS: ICD-10-CM

## 2024-03-03 DIAGNOSIS — M17.12 OSTEOARTHRITIS OF LEFT KNEE, UNSPECIFIED OSTEOARTHRITIS TYPE: ICD-10-CM

## 2024-03-03 DIAGNOSIS — J43.8 OTHER EMPHYSEMA (MULTI): ICD-10-CM

## 2024-03-03 DIAGNOSIS — I70.90 ATHEROSCLEROSIS: ICD-10-CM

## 2024-03-03 DIAGNOSIS — L22 DIAPER RASH: ICD-10-CM

## 2024-03-03 DIAGNOSIS — E55.9 VITAMIN D DEFICIENCY: ICD-10-CM

## 2024-03-03 DIAGNOSIS — F32.89 OTHER SPECIFIED DEPRESSIVE EPISODES: Primary | ICD-10-CM

## 2024-03-03 DIAGNOSIS — K59.09 OTHER CONSTIPATION: ICD-10-CM

## 2024-03-03 DIAGNOSIS — M25.512 ACUTE PAIN OF LEFT SHOULDER: ICD-10-CM

## 2024-03-03 DIAGNOSIS — R45.851 DEPRESSION WITH SUICIDAL IDEATION: ICD-10-CM

## 2024-03-03 LAB
APAP SERPL-MCNC: <10 UG/ML
APPEARANCE UR: CLEAR
BILIRUB UR STRIP.AUTO-MCNC: NEGATIVE MG/DL
COLOR UR: ABNORMAL
ETHANOL SERPL-MCNC: <10 MG/DL
ETHANOL SERPL-MCNC: <10 MG/DL
FLUAV RNA RESP QL NAA+PROBE: NOT DETECTED
FLUBV RNA RESP QL NAA+PROBE: NOT DETECTED
GLUCOSE UR STRIP.AUTO-MCNC: NEGATIVE MG/DL
KETONES UR STRIP.AUTO-MCNC: ABNORMAL MG/DL
LEUKOCYTE ESTERASE UR QL STRIP.AUTO: NEGATIVE
MAGNESIUM SERPL-MCNC: 1.72 MG/DL (ref 1.6–2.4)
NITRITE UR QL STRIP.AUTO: NEGATIVE
PH UR STRIP.AUTO: 8 [PH]
POTASSIUM SERPL-SCNC: 3 MMOL/L (ref 3.5–5.3)
POTASSIUM SERPL-SCNC: 3.3 MMOL/L (ref 3.5–5.3)
POTASSIUM SERPL-SCNC: 3.4 MMOL/L (ref 3.5–5.3)
PROT UR STRIP.AUTO-MCNC: ABNORMAL MG/DL
RBC # UR STRIP.AUTO: NEGATIVE /UL
RBC #/AREA URNS AUTO: NORMAL /HPF
SALICYLATES SERPL-MCNC: <3 MG/DL
SARS-COV-2 RNA RESP QL NAA+PROBE: NOT DETECTED
SP GR UR STRIP.AUTO: <1.005
UROBILINOGEN UR STRIP.AUTO-MCNC: ABNORMAL MG/DL
WBC #/AREA URNS AUTO: NORMAL /HPF

## 2024-03-03 PROCEDURE — 99223 1ST HOSP IP/OBS HIGH 75: CPT | Performed by: NURSE PRACTITIONER

## 2024-03-03 PROCEDURE — 2500000001 HC RX 250 WO HCPCS SELF ADMINISTERED DRUGS (ALT 637 FOR MEDICARE OP): Performed by: PSYCHIATRY & NEUROLOGY

## 2024-03-03 PROCEDURE — 36415 COLL VENOUS BLD VENIPUNCTURE: CPT | Performed by: EMERGENCY MEDICINE

## 2024-03-03 PROCEDURE — 2500000002 HC RX 250 W HCPCS SELF ADMINISTERED DRUGS (ALT 637 FOR MEDICARE OP, ALT 636 FOR OP/ED): Mod: MUE | Performed by: EMERGENCY MEDICINE

## 2024-03-03 PROCEDURE — 83735 ASSAY OF MAGNESIUM: CPT | Performed by: EMERGENCY MEDICINE

## 2024-03-03 PROCEDURE — 84132 ASSAY OF SERUM POTASSIUM: CPT | Performed by: EMERGENCY MEDICINE

## 2024-03-03 PROCEDURE — 87636 SARSCOV2 & INF A&B AMP PRB: CPT | Performed by: STUDENT IN AN ORGANIZED HEALTH CARE EDUCATION/TRAINING PROGRAM

## 2024-03-03 PROCEDURE — 2500000001 HC RX 250 WO HCPCS SELF ADMINISTERED DRUGS (ALT 637 FOR MEDICARE OP): Performed by: EMERGENCY MEDICINE

## 2024-03-03 PROCEDURE — 2500000002 HC RX 250 W HCPCS SELF ADMINISTERED DRUGS (ALT 637 FOR MEDICARE OP, ALT 636 FOR OP/ED): Performed by: EMERGENCY MEDICINE

## 2024-03-03 PROCEDURE — 1240000001 HC SEMI-PRIVATE BH ROOM DAILY

## 2024-03-03 PROCEDURE — 81001 URINALYSIS AUTO W/SCOPE: CPT | Mod: 59 | Performed by: STUDENT IN AN ORGANIZED HEALTH CARE EDUCATION/TRAINING PROGRAM

## 2024-03-03 PROCEDURE — 2500000001 HC RX 250 WO HCPCS SELF ADMINISTERED DRUGS (ALT 637 FOR MEDICARE OP): Performed by: NURSE PRACTITIONER

## 2024-03-03 PROCEDURE — 2500000002 HC RX 250 W HCPCS SELF ADMINISTERED DRUGS (ALT 637 FOR MEDICARE OP, ALT 636 FOR OP/ED): Performed by: PSYCHIATRY & NEUROLOGY

## 2024-03-03 PROCEDURE — 80143 DRUG ASSAY ACETAMINOPHEN: CPT | Performed by: EMERGENCY MEDICINE

## 2024-03-03 RX ORDER — ACETAMINOPHEN 325 MG/1
650 TABLET ORAL EVERY 4 HOURS PRN
Status: DISCONTINUED | OUTPATIENT
Start: 2024-03-03 | End: 2024-03-20 | Stop reason: HOSPADM

## 2024-03-03 RX ORDER — CALCIUM CARBONATE 200(500)MG
1000 TABLET,CHEWABLE ORAL DAILY
Status: DISCONTINUED | OUTPATIENT
Start: 2024-03-03 | End: 2024-03-04

## 2024-03-03 RX ORDER — FAMOTIDINE 20 MG/1
20 TABLET, FILM COATED ORAL DAILY
Status: DISCONTINUED | OUTPATIENT
Start: 2024-03-03 | End: 2024-03-04

## 2024-03-03 RX ORDER — PANTOPRAZOLE SODIUM 40 MG/1
40 TABLET, DELAYED RELEASE ORAL
Status: DISCONTINUED | OUTPATIENT
Start: 2024-03-04 | End: 2024-03-17

## 2024-03-03 RX ORDER — IBUPROFEN 200 MG
1 TABLET ORAL DAILY
Status: DISCONTINUED | OUTPATIENT
Start: 2024-03-03 | End: 2024-03-06

## 2024-03-03 RX ORDER — ACETAMINOPHEN 325 MG/1
650 TABLET ORAL ONCE
Status: COMPLETED | OUTPATIENT
Start: 2024-03-03 | End: 2024-03-03

## 2024-03-03 RX ORDER — FLUOXETINE HYDROCHLORIDE 20 MG/1
20 CAPSULE ORAL DAILY
Status: DISCONTINUED | OUTPATIENT
Start: 2024-03-03 | End: 2024-03-20 | Stop reason: HOSPADM

## 2024-03-03 RX ORDER — CALCIUM CARBONATE 200(500)MG
1000 TABLET,CHEWABLE ORAL DAILY
Status: DISCONTINUED | OUTPATIENT
Start: 2024-03-04 | End: 2024-03-03

## 2024-03-03 RX ORDER — CALCIUM CARBONATE 1250 MG/5ML
1250 SUSPENSION ORAL
Status: DISCONTINUED | OUTPATIENT
Start: 2024-03-03 | End: 2024-03-03 | Stop reason: HOSPADM

## 2024-03-03 RX ORDER — OLANZAPINE 10 MG/2ML
5 INJECTION, POWDER, FOR SOLUTION INTRAMUSCULAR EVERY 6 HOURS PRN
Status: DISCONTINUED | OUTPATIENT
Start: 2024-03-03 | End: 2024-03-20 | Stop reason: HOSPADM

## 2024-03-03 RX ORDER — POTASSIUM CHLORIDE 750 MG/1
60 TABLET, FILM COATED, EXTENDED RELEASE ORAL ONCE
Status: COMPLETED | OUTPATIENT
Start: 2024-03-03 | End: 2024-03-03

## 2024-03-03 RX ORDER — CALCIUM CARBONATE 500(1250)
1250 TABLET ORAL
Status: DISCONTINUED | OUTPATIENT
Start: 2024-03-03 | End: 2024-03-03 | Stop reason: HOSPADM

## 2024-03-03 RX ORDER — FAMOTIDINE 20 MG/1
20 TABLET, FILM COATED ORAL DAILY
Status: DISCONTINUED | OUTPATIENT
Start: 2024-03-03 | End: 2024-03-03

## 2024-03-03 RX ORDER — OLANZAPINE 5 MG/1
5 TABLET ORAL EVERY 6 HOURS PRN
Status: DISCONTINUED | OUTPATIENT
Start: 2024-03-03 | End: 2024-03-20 | Stop reason: HOSPADM

## 2024-03-03 RX ORDER — CALCIUM CARBONATE 200(500)MG
500 TABLET,CHEWABLE ORAL ONCE
Status: COMPLETED | OUTPATIENT
Start: 2024-03-03 | End: 2024-03-03

## 2024-03-03 RX ORDER — POTASSIUM CHLORIDE 750 MG/1
40 TABLET, FILM COATED, EXTENDED RELEASE ORAL ONCE
Status: COMPLETED | OUTPATIENT
Start: 2024-03-03 | End: 2024-03-03

## 2024-03-03 RX ORDER — ALUMINUM HYDROXIDE, MAGNESIUM HYDROXIDE, AND SIMETHICONE 1200; 120; 1200 MG/30ML; MG/30ML; MG/30ML
30 SUSPENSION ORAL EVERY 6 HOURS PRN
Status: DISCONTINUED | OUTPATIENT
Start: 2024-03-03 | End: 2024-03-20 | Stop reason: HOSPADM

## 2024-03-03 RX ORDER — NAPROXEN 250 MG/1
250 TABLET ORAL ONCE
Status: DISCONTINUED | OUTPATIENT
Start: 2024-03-03 | End: 2024-03-03 | Stop reason: HOSPADM

## 2024-03-03 RX ORDER — HYDROXYZINE PAMOATE 25 MG/1
25 CAPSULE ORAL EVERY 4 HOURS PRN
Status: DISCONTINUED | OUTPATIENT
Start: 2024-03-03 | End: 2024-03-20 | Stop reason: HOSPADM

## 2024-03-03 RX ORDER — ACETAMINOPHEN 500 MG
5 TABLET ORAL NIGHTLY PRN
Status: DISCONTINUED | OUTPATIENT
Start: 2024-03-03 | End: 2024-03-20 | Stop reason: HOSPADM

## 2024-03-03 RX ADMIN — CALCIUM CARBONATE (ANTACID) CHEW TAB 500 MG 1000 MG: 500 CHEW TAB at 23:32

## 2024-03-03 RX ADMIN — CALCIUM 1250 MG: 500 TABLET ORAL at 08:44

## 2024-03-03 RX ADMIN — POTASSIUM CHLORIDE 40 MEQ: 750 TABLET, FILM COATED, EXTENDED RELEASE ORAL at 06:51

## 2024-03-03 RX ADMIN — ALUMINUM HYDROXIDE, MAGNESIUM HYDROXIDE, AND DIMETHICONE 30 ML: 200; 20; 200 SUSPENSION ORAL at 19:53

## 2024-03-03 RX ADMIN — ACETAMINOPHEN 650 MG: 325 TABLET ORAL at 03:28

## 2024-03-03 RX ADMIN — OLANZAPINE 5 MG: 5 TABLET, FILM COATED ORAL at 19:45

## 2024-03-03 RX ADMIN — POTASSIUM CHLORIDE 40 MEQ: 750 TABLET, FILM COATED, EXTENDED RELEASE ORAL at 16:35

## 2024-03-03 RX ADMIN — FAMOTIDINE 20 MG: 20 TABLET ORAL at 23:32

## 2024-03-03 RX ADMIN — POTASSIUM CHLORIDE 60 MEQ: 750 TABLET, FILM COATED, EXTENDED RELEASE ORAL at 11:32

## 2024-03-03 RX ADMIN — Medication 5 MG: at 19:45

## 2024-03-03 RX ADMIN — HYDROXYZINE PAMOATE 25 MG: 25 CAPSULE ORAL at 19:45

## 2024-03-03 RX ADMIN — ANTACID TABLETS 500 MG: 500 TABLET, CHEWABLE ORAL at 06:13

## 2024-03-03 SDOH — SOCIAL STABILITY: SOCIAL INSECURITY: WERE YOU ABLE TO COMPLETE ALL THE BEHAVIORAL HEALTH SCREENINGS?: NO

## 2024-03-03 ASSESSMENT — ACTIVITIES OF DAILY LIVING (ADL)
TOILETING: NEEDS ASSISTANCE
BATHING: NEEDS ASSISTANCE
WALKS IN HOME: INDEPENDENT
DRESSING YOURSELF: NEEDS ASSISTANCE
HEARING - LEFT EAR: FUNCTIONAL
FEEDING YOURSELF: INDEPENDENT
ADEQUATE_TO_COMPLETE_ADL: YES
GROOMING: NEEDS ASSISTANCE
PATIENT'S MEMORY ADEQUATE TO SAFELY COMPLETE DAILY ACTIVITIES?: YES
JUDGMENT_ADEQUATE_SAFELY_COMPLETE_DAILY_ACTIVITIES: YES
HEARING - RIGHT EAR: FUNCTIONAL

## 2024-03-03 ASSESSMENT — PAIN SCALES - GENERAL: PAINLEVEL_OUTOF10: 0 - NO PAIN

## 2024-03-03 NOTE — PROGRESS NOTES
Emergency Medicine Transition of Care Note.    I received Donald Mcknight in signout from Dr. Whitmore.  Please see the previous ED provider note for all HPI, PE and MDM up to the time of signout at 1600. This is in addition to the primary record.    In brief Donald Mcknight is an 73 y.o. male presenting for   Chief Complaint   Patient presents with    Chest Pain     Ongoing for 2.5 weeks. L sided pain radiating to back, neck and L arm. + SOB. Took aleve without relief. Reports hx of rib fractures 6 months go from fall, this pain is different     At the time of signout we were awaiting: Psychiatric placement.    Diagnoses as of 03/03/24 1606   Chest wall pain       Medical Decision Making  Patient is excepted to [facility] and will be transferred there by ambulance for further psychiatric treatment.    Final diagnoses:   [R07.89] Chest wall pain           Procedure  Procedures    Suly Wiggins MD

## 2024-03-03 NOTE — PROGRESS NOTES
This progress note represents a emergency department transition note for signout of care.    Patient care was signed out to me. Please see the previous provider's notes for the full history and physical. Briefly, Donald Mcknight is 73 y.o. male who presented to the emergency department for chest pain.  Patient had underwent a workup.  This workup was negative.  When attempting to discharge patient, he began to have suicidal ideations.  Patient was medically cleared by the prior provider.  Patient was pending EPAT placement.  During my shift, patient's ED course was overall unremarkable.  Patient signed out to the oncoming physician pending EPAT placement.    Rehan Whitmore DO  Emergency Medicine    Diagnoses as of 03/03/24 0701   Chest wall pain

## 2024-03-03 NOTE — PROGRESS NOTES
EPAT - Social Work Psychiatric Assessment    Arrival Details  Mode of Arrival: Ambulance  Admission Source: Emergency department  Admission Type: Voluntary  EPAT Assessment Start Date: 03/02/24  EPAT Assessment Start Time: 2125  Name of : Maribell Ortega M.Ed., TRACI    History of Present Illness    Admission Reason: Chest pain and possible psychosis    HPI: The patient is a 73 yr old male with no psychiatric history. He presents in the ED with concerns of chest pain and and family is concerned he is hallucinating. According to ED notes, the patient’s family believes that the patient may be hallucinating due to saying “odd and off the wall things” though he doesn’t appear to be showing signs of any hallucinating. The patient recently had covid about 2 weeks ago and has been having health difficulties since. The patient also has been refusing to eat recently with no medical complaints of nausea or vomiting. The patient scored low risk for suicide on the C-SSRS when assessed in the ED. The patient was referred to EPAT for psychiatric evaluation.       Patient history was reviewed before EPAT evaluation.       Psychiatric Diagnosis History: PTSD (war )      Psychiatric Medication/Treatment History: None recently. The patient said right when he came out of the war.    SW Readmission Information   Readmission within 30 Days: Yes  Previous ED Visit Date and Reason : 2/27/2024 SOB and chest pain  Previous Discharge Date and Location: 2/27/2024  Factors Contributing to  Readmission Inpatient/ED (Team Perspective): Other (Comments) (Patient has had covid within the past few weeks)    Psychiatric Symptoms  Anxiety Symptoms: No problems reported or observed.  Depression Symptoms: Appetite change, Change in energy level, Feelings of helplessness, Feelings of hopelessess, Impaired concentration, Sleep disturbance  Wen Symptoms: No problems reported or observed.    Psychosis Symptoms  Hallucination Type:  "Auditory  Delusion Type: No problems reported or observed.    Additional Symptoms - Adult  Generalized Anxiety Disorder: Restlessness, Sleep disturbance  Obsessive Compulsive Disorder: No problems reported or observed.  Panic Attack: No problems reported or observed.  Post Traumatic Stress Disorder: Re-living event, Traumatic event, Other (Comment) (Patient is a war  who does have PTSD with flashbacks. Family reports flashbacks are really bad now.)  Delirium: Acute inattention, Disorientation, Sleep-wake abnormal (Daughter and wife report an acute change in patient's personality. Daughter reports he told her \"get in the foxhole!\" Flashbacks/hallucinations are worseing.)  Review of Symptoms Comments: The patient reports that he \"just needs sleep\" because he has been up for 8 days due to his \"restless leg syndrome. He also endorses auditory hallucinations and said he \"hears people talking in the corner right now\" and pointed to the ceiling. Family reports an acute change in personality and behavior in the past month. He has made suicidal statements to them such as \"I want to shoot myself\" and \"Get me a knife so I can die\".    Past Psychiatric History/Meds/Treatments  Past Psychiatric History: PTSD  Past Psychiatric Meds/Treatments: None since he got out of the war  Past Violence/Victimization History: Patient is a war  who's famiily reports he does not like talking about it and \"hates 's day\"    Current Mental Health Contacts  Provider Name/Phone Number: Brenda Gamino APRN-CNP  Provider Last Appointment Date: 04/26/2023    Support System: Immediate family, Community    Living Arrangement: House, Lives with someone (Lives with wife.)    Home Safety  Feels Safe Living in Home: Yes    Income Information  Employment Status for: Patient  Employment Status: Retired  Income Source: [employer]  Current/Previous Occupation:     [employer] Service/Education History  Current or Previous  " "Service: Active duty, past   Experience: Experienced combat    Social/Cultural History  Social History: 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 [place] and [place]   Cultural Requests During Hospitalization: None  Spiritual Requests During Hospitalization: None  Important Activities: Family    Legal  Legal Considerations:  (None reported)  Criminal Activity/ Legal Involvement Pertinent to Current Situation/ Hospitalization: None reported  Legal Concerns: None    Drug Screening  Have you used any substances (canabis, cocaine, heroin, hallucinogens, inhalants, etc.) in the past 12 months?: Yes (Patient reports he had \"synthetic gummies\")  Have you used any prescription drugs other than prescribed in the past 12 months?: No  Is a toxicology screen needed?: Yes    Stage of Change  Stage of Change: Precontemplation  History of Treatment:  (None.)  Type of Treatment Offered: Other (Comment) (THRIVE)  Treatment Offered: Declined  Duration of Substance Use: Patient said he hasn't had a drink in 3 years and threw the gummies away  Frequency of Substance Use: Unknown  Age of First Substance Use: Unknown    Psychosocial  Psychosocial (WDL): Exceptions to WDL  Behaviors/Mood: Anxious, Appropriate for situation, Cooperative, Hallucinations, Restless  Affect: Appropriate to circumstances  Parent/Guardian/Significant Other Involvement: Attentive to patient needs  Family Behaviors: Appropriate for situation, Anxious, Cooperative, Supportive  Needs Expressed: Emotional  Emotional Support Given: Reassure    Orientation  Orientation Level: Oriented X4    General Appearance  Motor Activity: Unremarkable  Speech Pattern:  (WDL)  General Attitude: Cooperative, Guarded  Appearance/Hygiene: Disheveled    Thought Process  Coherency: Disorganized (Sometimes the patient appears coherent and sometimes he appears to get disoriented.)  Content: Unremarkable  Delusions:  (None reported)  Perception: Hallucinations  Hallucination: " Auditory  Judgment/Insight: Impaired  Confusion: Mild  Cognition: Follows commands    Sleep Pattern  Sleep Pattern: Difficulty falling asleep, Disturbed/interrupted sleep, Restlessness    Risk Factors  Self Harm/Suicidal Ideation Plan: Patient denies but family reports he said he wanted to shoot himself and asked for a knife to kill himself  Previous Self Harm/Suicidal Plans: None reported  Risk Factors: Access to weapons,  history or weapons training, Presence of firearms in home  Description of Thoughts/Ideas Leaving Unit Now: Unable to assess    Violence Risk Assessment  Assessment of Violence: On admission  Thoughts of Harm to Others: No    Ability to Assess Risk Screen  Risk Screen - Ability to Assess: Able to be screened  Ask Suicide-Screening Questions  1. In the past few weeks, have you wished you were dead?: Yes (Patient denies but family reports patient made suicidal statements. This screening will be based off of reports from patient's daughter and wife)  2. In the past few weeks, have you felt that you or your family would be better off if you were dead?: Yes (Patient denies but family reports patient made suicidal statements. This screening will be based off of reports from patient's daughter and wife)  3. In the past week, have you been having thoughts about killing yourself?: Yes (Patient denies but family reports patient made suicidal statements. This screening will be based off of reports from patient's daughter and wife)  4. Have you ever tried to kill yourself?: No  5. Are you having thoughts of killing yourself right now?: No  Calculated Risk Score: Potential Risk  Humphreys Suicide Severity Rating Scale (Screener/Recent Self-Report)  1. Wish to be Dead (Past 1 Month): Yes (Patient denies but family reports patient made suicidal statements. This screening will be based off of reports from patient's daughter and wife)  2. Non-Specific Active Suicidal Thoughts (Past 1 Month): Yes (Patient  "denies but family reports patient made suicidal statements. This screening will be based off of reports from patient's daughter and wife)  3. Active Suicidal Ideation with any Methods (Not Plan) Without Intent to Act (Past 1 Month): Yes (Patient denies but family reports patient made suicidal statements. This screening will be based off of reports from patient's daughter and wife)  4. Active Suicidal Ideation with Some Intent to Act, Without Specific Plan (Past 1 Month): No  5. Active Suicidal Ideation with Specific Plan and Intent (Past 1 Month): Yes (Patient denies but family reports patient made suicidal statements. This screening will be based off of reports from patient's daughter and wife)  6. Suicidal Behavior (Lifetime): No  Calculated C-SSRS Risk Score (Lifetime/Recent): High Risk  Step 1: Risk Factors  Current & Past Psychiatric Dx: PTSD  Presenting Symptoms: Anhedonia, Hopelessness or despair, Psychosis  Family History: Other (Comment) (Father had dementia)  Precipitants/Stressors: Triggering events leading to humiliation, shame, and/or despair (e.g. loss of relationship, financial or health status) (real or anticipated)  Change in Treatment: Non-compliant or not receiving treatment  Access to Lethal Methods : Yes (Patient has a gun at home. Patient's wife said the gun is locked up in a safe and she has the key. The patient does not know where the key is.)  Step 2: Protective Factors   Protective Factors Internal: Identifies reasons for living  Protective Factors External: Supportive social network or family or friends  Step 3: Suicidal Ideation Intensity  Most Severe Suicidal Ideation Identified: Based off of what family reports \"I want to shoot myself\" \"Get me a knife so I can kill myself\"  How Many Times Have You Had These Thoughts: 2-5 times in a week  When You Have the Thoughts How Long do They Last : 1-4 hours/a lot of the time  Could/Can You Stop Thinking About Killing Yourself or Wanting to Die if " You Want to: Can control thoughts with a lot of difficulty  Are There Things - Anyone or Anything - That Stopped You From Wanting to Die or Acting on: Uncertain that deterrents stopped you  What Sort of Reasons Did You Have For Thinking About Wanting to Die or Killing Yourself: Mostly to end or stop the pain (you couldn't go on living with the pain or how you were feeling)  Total Score: 17  Step 5: Documentation  Risk Level: Moderate suicide risk (Based off of what family reports)    Psychiatric Impression and Plan of Care    Assessment and Plan: The patient is a 73 yr old male with a history of PTSD from war in the [place] . He presents in the ED with concerns of suicidal ideation and psychosis. The patient was cooperative throughout the EPAT evaluation.   The patient denies any SI/HI/VH. He endorses that he does hear voices sometimes. The patient pointed to the corner of the ceiling and said, “For example, I hear people talking in that corner”. The patient endorsed that he was trying synthetic marijuana gummies but “threw them away”. He declines needing any substance use treatment. He also reports that he hasn’t had a drink of alcohol in 3 years. The patient did appear to be a little disoriented and reports that he “just needs to sleep because I haven’t slept in 8 days because of my restless leg syndrome”. He also reports that he doesn’t have an appetite and won’t eat. When asked if he isn’t eating because his stomach hurts the patient said “Yeah”. Then he said, “I just need to sleep”       Collateral: This writer spoke to the patient’s wife and daughter at the same time on the phone. They are highly worried about the patient. They report an acute change in personality and behavior within the past month. The patient did have covid 2 weeks ago but the acute change has been going on for a month now. They report that the patient has asked for a gun saying, “I want to shoot myself.” They also report him asking for  a knife so that he could kill himself. The patient’s daughter and wife report that he has had flashbacks in the past but now they are a lot more frequent. The daughter and wife report that the patient has been yelling and screaming things at them like, “Get in the foxhole!” The patient’s daughter also reports that he has always “dressed to the nines” and took care of his hygiene. She reports a rapid decline in patient taking care of himself. She reports, “I don’t know who this person is. My dad needs help.” The patient’s wife reports that the patient does have a gun but it is in a safe and she has the key and that the patient does not know where the key is.       The patient scored low risk for suicide on the C-SSRS when assessed in the ED. The patient scored high risk for suicide on the C-SSRS after taking into consideration reports from the patient’s wife and daughter. The patient is also experiencing an acute change in personality/behavior. Dr. Koroma also reports that the patient has come in multiple times with chest pain and they have done all the medical work ups and testing for the patient and nothing medically has been found. The patient meets criteria for inpatient psychiatric admission. Watkins MD agrees with inpatient psychiatric admission.     Diagnostic Impression: PTSD with flashbacks, possible onset of dementia with psychosis    Specific Resources Provided to Patient: Inpatient psychiatric admission  CM Notified: N/A  PHP/IOP Recommended: None  Specific Information Provided for PHP/IOP: None  Plan Comments: None    Outcome/Disposition  Patient's Perception of Outcome Achieved: Unable to assess  Assessment, Recommendations and Risk Level Reviewed with: Chiqui Koroma MD  Contact Name: Sophy Mcknight  Contact Number(s): [phone]  Contact Relationship: Spouse  EPAT Assessment Completed Date: 03/02/24  EPAT Assessment Completed Time: 2342  Patient Disposition: Out of network facility  (Specify)  Out of Network Reason: Other (Comment) (Patient has [employer] insurance)

## 2024-03-03 NOTE — SIGNIFICANT EVENT
Application for Emergency Admission      Ready for Transfer?  Is the patient medically cleared for transfer to inpatient psychiatry: Yes  Has the patient been accepted to an inpatient psychiatric hospital: Yes    Application for Emergency Admission  IN ACCORDANCE WITH [place] 5122.10 [place]  The Chief Clinical Officer of: [facility] 3/3/2024 .4:44 PM    Reason for Hospitalization  The undersigned has reason to believe that: Donald Mcknight Is a mentally ill person subject to hospitalization by court order under division B Section 5122.01 of the Revised Code, i.e., this person:    1.Yes  Represents a substantial risk of physical harm to self as manifested by evidence of threats of, or attempts at, suicide or serious self-inflicted bodily harm    2.No Represents a substantial risk of physical harm to others as manifested by evidence of recent homicidal or other violent behavior, evidence of recent threats that place another in reasonable fear of violent behavior and serious physical harm, or other evidence of present dangerousness    3.No Represents a substantial and immediate risk of serious physical impairment or injury to self as manifested by  evidence that the person is unable to provide for and is not providing for the person's basic physical needs because of the person's mental illness and that appropriate provision for those needs cannot be made  immediately available in the community    4.Yes Would benefit from treatment in a hospital for his mental illness and is in need of such treatment as manifested by evidence of behavior that creates a grave and imminent risk to substantial rights of others or  himself.    5.No Would benefit from treatment as manifested by evidence of behavior that indicates all of the following:       (a) The person is unlikely to survive safely in the community without supervision, based on a clinical determination.       (b) The person has a history of lack of compliance  with treatment for mental illness and one of the following applies:      (i) At least twice within the thirty-six months prior to the filing of an affidavit seeking court-ordered treatment of the person under section 5122.111 of the Revised Code, the lack of compliance has been a significant factor in necessitating hospitalization in a hospital or receipt of services in a forensic or other mental health unit of a correctional facility, provided that the thirty-six-month period shall be extended by the length of any hospitalization or incarceration of the person that occurred within the thirty-six-month period.      (ii) Within the forty-eight months prior to the filing of an affidavit seeking court-ordered treatment of the person under section 5122.111 of the Revised Code, the lack of compliance resulted in one or more acts of serious violent behavior toward self or others or threats of, or attempts at, serious physical harm to self or others, provided that the forty-eight-month period shall be extended by the length of any hospitalization or incarceration of the person that occurred within the forty-eight-month period.      (c) The person, as a result of mental illness, is unlikely to voluntarily participate in necessary treatment.       (d) In view of the person's treatment history and current behavior, the person is in need of treatment in order to prevent a relapse or deterioration that would be likely to result in substantial risk of serious harm to the person or others.    (e) Represents a substantial risk of physical harm to self or others if allowed to remain at liberty pending examination.    Therefore, it is requested that said person be admitted to the above named facility.    STATEMENT OF BELIEF    Must be filled out by one of the following: a psychiatrist, licensed physician, licensed clinical psychologist, health or ,  or .  (Statement shall include the circumstances  under which the individual was taken into custody and the reason for the person's belief that hospitalization is necessary. The statement shall also include a reference to efforts made to secure the individual's property at his residence if he was taken into custody there. Every reasonable and appropriate effort should be made to take this person into custody in the least conspicuous manner possible.)    Patient presents emergency department secondary to concern for chest pain also concern for possible hallucinations and depression with suicidal ideation.  Patient's chest pain has been evaluated multiple times and is negative for acute process that requires hospitalization.  He is cleared from that medical standpoint.  Patient is expressing suicidal thoughts and family is concerned about hallucinations.  Because of this patient would benefit from psychiatric hospitalization and stabilization.  Patient needs to be transferred to a psychiatric facility for evaluation and treatment.    Suly Wiggins MD 3/3/2024     _____________________________________________________________   Place of Employment: [facility]    STATEMENT OF OBSERVATION BY PSYCHIATRIST, LICENSED PHYSICIAN, OR LICENSED CLINICAL PSYCHOLOGIST, IF APPLICABLE    Place of Observation (e.g., [facility], general hospital, office, emergency facility)  (If applicable, please complete)    Suly Wiggins MD 3/3/2024    _____________________________________________________________

## 2024-03-03 NOTE — PROGRESS NOTES
Emergency Medicine Transition of Care Note.    I received Donald Mcknight in signout from Dr. Koroma.  Please see the previous ED provider note for all HPI, PE and MDM up to the time of signout. This is in addition to the primary record.    In brief Donald Mcknight is an 73 y.o. male presenting for   Chief Complaint   Patient presents with    Chest Pain     Ongoing for 2.5 weeks. L sided pain radiating to back, neck and L arm. + SOB. Took aleve without relief. Reports hx of rib fractures 6 months go from fall, this pain is different        Diagnoses as of 03/03/24 0304   Chest wall pain       Medical Decision Making  Patient was signed out to me pending placement.  I did review his medical workup.  His laboratory studies do not show anything acute except for potassium of 2.8 which was repleted.  CAT scans of the head, CT angio of the chest did not reveal anything acute.  He is medically cleared for psychiatric evaluation and admission.        Final diagnoses:   [R07.89] Chest wall pain           Procedure  Procedures    Grayson Hernandez MD

## 2024-03-04 ENCOUNTER — APPOINTMENT (OUTPATIENT)
Dept: CARDIOLOGY | Facility: HOSPITAL | Age: 74
DRG: 884 | End: 2024-03-04
Payer: MEDICARE

## 2024-03-04 LAB
25(OH)D3 SERPL-MCNC: 66 NG/ML (ref 30–100)
CARDIAC TROPONIN I PNL SERPL HS: 15 NG/L (ref 0–20)
CHOLEST SERPL-MCNC: 130 MG/DL (ref 0–199)
CHOLESTEROL/HDL RATIO: 3.8
GLUCOSE P FAST SERPL-MCNC: 93 MG/DL (ref 74–99)
HDLC SERPL-MCNC: 33.9 MG/DL
LDLC SERPL CALC-MCNC: 63 MG/DL
NON HDL CHOLESTEROL: 96 MG/DL (ref 0–149)
POTASSIUM SERPL-SCNC: 3.8 MMOL/L (ref 3.5–5.3)
TRIGL SERPL-MCNC: 165 MG/DL (ref 0–149)
VLDL: 33 MG/DL (ref 0–40)

## 2024-03-04 PROCEDURE — 2500000001 HC RX 250 WO HCPCS SELF ADMINISTERED DRUGS (ALT 637 FOR MEDICARE OP): Performed by: NURSE PRACTITIONER

## 2024-03-04 PROCEDURE — 82306 VITAMIN D 25 HYDROXY: CPT | Mod: GEALAB | Performed by: PSYCHIATRY & NEUROLOGY

## 2024-03-04 PROCEDURE — 99221 1ST HOSP IP/OBS SF/LOW 40: CPT | Performed by: PSYCHIATRY & NEUROLOGY

## 2024-03-04 PROCEDURE — 2500000002 HC RX 250 W HCPCS SELF ADMINISTERED DRUGS (ALT 637 FOR MEDICARE OP, ALT 636 FOR OP/ED): Performed by: PSYCHIATRY & NEUROLOGY

## 2024-03-04 PROCEDURE — S4991 NICOTINE PATCH NONLEGEND: HCPCS | Performed by: PSYCHIATRY & NEUROLOGY

## 2024-03-04 PROCEDURE — 94660 CPAP INITIATION&MGMT: CPT

## 2024-03-04 PROCEDURE — 2500000004 HC RX 250 GENERAL PHARMACY W/ HCPCS (ALT 636 FOR OP/ED): Mod: JW | Performed by: NURSE PRACTITIONER

## 2024-03-04 PROCEDURE — 80061 LIPID PANEL: CPT | Performed by: PSYCHIATRY & NEUROLOGY

## 2024-03-04 PROCEDURE — 82947 ASSAY GLUCOSE BLOOD QUANT: CPT | Performed by: PSYCHIATRY & NEUROLOGY

## 2024-03-04 PROCEDURE — 1240000001 HC SEMI-PRIVATE BH ROOM DAILY

## 2024-03-04 PROCEDURE — 36415 COLL VENOUS BLD VENIPUNCTURE: CPT | Performed by: PSYCHIATRY & NEUROLOGY

## 2024-03-04 PROCEDURE — 36415 COLL VENOUS BLD VENIPUNCTURE: CPT | Performed by: NURSE PRACTITIONER

## 2024-03-04 PROCEDURE — 93005 ELECTROCARDIOGRAM TRACING: CPT

## 2024-03-04 PROCEDURE — 2500000005 HC RX 250 GENERAL PHARMACY W/O HCPCS: Performed by: NURSE PRACTITIONER

## 2024-03-04 PROCEDURE — 2500000001 HC RX 250 WO HCPCS SELF ADMINISTERED DRUGS (ALT 637 FOR MEDICARE OP): Performed by: PSYCHIATRY & NEUROLOGY

## 2024-03-04 PROCEDURE — 93010 ELECTROCARDIOGRAM REPORT: CPT | Performed by: INTERNAL MEDICINE

## 2024-03-04 PROCEDURE — 84484 ASSAY OF TROPONIN QUANT: CPT | Performed by: NURSE PRACTITIONER

## 2024-03-04 PROCEDURE — 84132 ASSAY OF SERUM POTASSIUM: CPT | Performed by: NURSE PRACTITIONER

## 2024-03-04 RX ORDER — CALCIUM CARBONATE 200(500)MG
1000 TABLET,CHEWABLE ORAL DAILY
Status: DISCONTINUED | OUTPATIENT
Start: 2024-03-04 | End: 2024-03-20 | Stop reason: HOSPADM

## 2024-03-04 RX ORDER — CARVEDILOL 3.12 MG/1
6.25 TABLET ORAL 2 TIMES DAILY
Status: DISCONTINUED | OUTPATIENT
Start: 2024-03-04 | End: 2024-03-05

## 2024-03-04 RX ORDER — ATORVASTATIN CALCIUM 80 MG/1
80 TABLET, FILM COATED ORAL NIGHTLY
Status: DISCONTINUED | OUTPATIENT
Start: 2024-03-04 | End: 2024-03-20 | Stop reason: HOSPADM

## 2024-03-04 RX ORDER — ASPIRIN 81 MG/1
81 TABLET ORAL DAILY
Status: DISCONTINUED | OUTPATIENT
Start: 2024-03-05 | End: 2024-03-20 | Stop reason: HOSPADM

## 2024-03-04 RX ORDER — LORAZEPAM 2 MG/ML
2 INJECTION INTRAMUSCULAR ONCE
Status: DISCONTINUED | OUTPATIENT
Start: 2024-03-04 | End: 2024-03-04

## 2024-03-04 RX ORDER — CIPROFLOXACIN 500 MG/1
500 TABLET ORAL 2 TIMES DAILY
Status: COMPLETED | OUTPATIENT
Start: 2024-03-04 | End: 2024-03-05

## 2024-03-04 RX ORDER — LISINOPRIL 5 MG/1
5 TABLET ORAL DAILY
Status: DISCONTINUED | OUTPATIENT
Start: 2024-03-04 | End: 2024-03-05

## 2024-03-04 RX ORDER — ALBUTEROL SULFATE 0.83 MG/ML
2.5 SOLUTION RESPIRATORY (INHALATION) EVERY 6 HOURS PRN
Status: DISCONTINUED | OUTPATIENT
Start: 2024-03-04 | End: 2024-03-14

## 2024-03-04 RX ORDER — NAPROXEN SODIUM 220 MG/1
324 TABLET, FILM COATED ORAL ONCE
Status: COMPLETED | OUTPATIENT
Start: 2024-03-04 | End: 2024-03-04

## 2024-03-04 RX ORDER — OLANZAPINE 10 MG/2ML
5 INJECTION, POWDER, FOR SOLUTION INTRAMUSCULAR ONCE
Status: COMPLETED | OUTPATIENT
Start: 2024-03-04 | End: 2024-03-04

## 2024-03-04 RX ORDER — DIVALPROEX SODIUM 500 MG/1
500 TABLET, DELAYED RELEASE ORAL ONCE
Status: COMPLETED | OUTPATIENT
Start: 2024-03-04 | End: 2024-03-04

## 2024-03-04 RX ORDER — DIVALPROEX SODIUM 250 MG/1
250 TABLET, DELAYED RELEASE ORAL 2 TIMES DAILY
Status: DISCONTINUED | OUTPATIENT
Start: 2024-03-04 | End: 2024-03-06

## 2024-03-04 RX ORDER — METRONIDAZOLE 500 MG/1
500 TABLET ORAL 2 TIMES DAILY
Status: COMPLETED | OUTPATIENT
Start: 2024-03-04 | End: 2024-03-05

## 2024-03-04 RX ORDER — FAMOTIDINE 20 MG/1
20 TABLET, FILM COATED ORAL DAILY
Status: DISCONTINUED | OUTPATIENT
Start: 2024-03-04 | End: 2024-03-19

## 2024-03-04 RX ORDER — NYSTATIN 100000 U/G
OINTMENT TOPICAL 2 TIMES DAILY
Status: DISCONTINUED | OUTPATIENT
Start: 2024-03-04 | End: 2024-03-20 | Stop reason: HOSPADM

## 2024-03-04 RX ORDER — LIDOCAINE 560 MG/1
1 PATCH PERCUTANEOUS; TOPICAL; TRANSDERMAL DAILY
Status: DISCONTINUED | OUTPATIENT
Start: 2024-03-04 | End: 2024-03-20 | Stop reason: HOSPADM

## 2024-03-04 RX ADMIN — NYSTATIN: 100000 OINTMENT TOPICAL at 08:57

## 2024-03-04 RX ADMIN — ASPIRIN 81 MG 324 MG: 81 TABLET ORAL at 09:57

## 2024-03-04 RX ADMIN — OLANZAPINE 5 MG: 5 TABLET, FILM COATED ORAL at 01:54

## 2024-03-04 RX ADMIN — ALUMINUM HYDROXIDE, MAGNESIUM HYDROXIDE, AND DIMETHICONE 30 ML: 200; 20; 200 SUSPENSION ORAL at 01:53

## 2024-03-04 RX ADMIN — NICOTINE 1 PATCH: 21 PATCH, EXTENDED RELEASE TRANSDERMAL at 08:54

## 2024-03-04 RX ADMIN — CARVEDILOL 6.25 MG: 3.12 TABLET, FILM COATED ORAL at 12:28

## 2024-03-04 RX ADMIN — LIDOCAINE 1 PATCH: 4 PATCH TOPICAL at 11:55

## 2024-03-04 RX ADMIN — ATORVASTATIN CALCIUM 80 MG: 80 TABLET, FILM COATED ORAL at 21:10

## 2024-03-04 RX ADMIN — CIPROFLOXACIN 500 MG: 500 TABLET ORAL at 21:07

## 2024-03-04 RX ADMIN — METRONIDAZOLE 500 MG: 500 TABLET ORAL at 21:07

## 2024-03-04 RX ADMIN — CIPROFLOXACIN 500 MG: 500 TABLET ORAL at 11:56

## 2024-03-04 RX ADMIN — ACETAMINOPHEN 650 MG: 325 TABLET ORAL at 21:11

## 2024-03-04 RX ADMIN — NYSTATIN: 100000 OINTMENT TOPICAL at 21:07

## 2024-03-04 RX ADMIN — PANTOPRAZOLE SODIUM 40 MG: 40 TABLET, DELAYED RELEASE ORAL at 06:36

## 2024-03-04 RX ADMIN — DIVALPROEX SODIUM 500 MG: 500 TABLET, DELAYED RELEASE ORAL at 04:56

## 2024-03-04 RX ADMIN — FAMOTIDINE 20 MG: 20 TABLET ORAL at 08:55

## 2024-03-04 RX ADMIN — DIVALPROEX SODIUM 250 MG: 250 TABLET, DELAYED RELEASE ORAL at 21:10

## 2024-03-04 RX ADMIN — OLANZAPINE 5 MG: 10 INJECTION, POWDER, FOR SOLUTION INTRAMUSCULAR at 03:33

## 2024-03-04 RX ADMIN — LISINOPRIL 5 MG: 5 TABLET ORAL at 09:57

## 2024-03-04 RX ADMIN — FLUOXETINE HYDROCHLORIDE 20 MG: 20 CAPSULE ORAL at 08:55

## 2024-03-04 RX ADMIN — HYDROXYZINE PAMOATE 25 MG: 25 CAPSULE ORAL at 01:54

## 2024-03-04 RX ADMIN — CARVEDILOL 6.25 MG: 3.12 TABLET, FILM COATED ORAL at 21:10

## 2024-03-04 RX ADMIN — METRONIDAZOLE 500 MG: 500 TABLET ORAL at 11:56

## 2024-03-04 RX ADMIN — NITROGLYCERIN 0.5 INCH: 20 OINTMENT TOPICAL at 21:07

## 2024-03-04 SDOH — SOCIAL STABILITY: SOCIAL INSECURITY: ARE THERE ANY APPARENT SIGNS OF INJURIES/BEHAVIORS THAT COULD BE RELATED TO ABUSE/NEGLECT?: UNABLE TO ASSESS

## 2024-03-04 SDOH — SOCIAL STABILITY: SOCIAL INSECURITY: DO YOU FEEL UNSAFE GOING BACK TO THE PLACE WHERE YOU ARE LIVING?: UNABLE TO ASSESS

## 2024-03-04 SDOH — SOCIAL STABILITY: SOCIAL INSECURITY: ARE YOU OR HAVE YOU BEEN THREATENED OR ABUSED PHYSICALLY, EMOTIONALLY, OR SEXUALLY BY ANYONE?: UNABLE TO ASSESS

## 2024-03-04 SDOH — SOCIAL STABILITY: SOCIAL INSECURITY: DOES ANYONE TRY TO KEEP YOU FROM HAVING/CONTACTING OTHER FRIENDS OR DOING THINGS OUTSIDE YOUR HOME?: UNABLE TO ASSESS

## 2024-03-04 SDOH — SOCIAL STABILITY: SOCIAL INSECURITY: ABUSE: ADULT

## 2024-03-04 SDOH — SOCIAL STABILITY: SOCIAL INSECURITY: DO YOU FEEL ANYONE HAS EXPLOITED OR TAKEN ADVANTAGE OF YOU FINANCIALLY OR OF YOUR PERSONAL PROPERTY?: UNABLE TO ASSESS

## 2024-03-04 SDOH — SOCIAL STABILITY: SOCIAL INSECURITY: WERE YOU ABLE TO COMPLETE ALL THE BEHAVIORAL HEALTH SCREENINGS?: NO

## 2024-03-04 SDOH — HEALTH STABILITY: MENTAL HEALTH: EXPERIENCED ANY OF THE FOLLOWING LIFE EVENTS: COMBAT OR EXPOSURE TO A WAR-ZONE

## 2024-03-04 SDOH — SOCIAL STABILITY: SOCIAL INSECURITY: HAS ANYONE EVER THREATENED TO HURT YOUR FAMILY OR YOUR PETS?: UNABLE TO ASSESS

## 2024-03-04 ASSESSMENT — ENCOUNTER SYMPTOMS
MUSCULOSKELETAL NEGATIVE: 1
ALLERGIC/IMMUNOLOGIC NEGATIVE: 1
FEVER: 0
CHILLS: 0
WEAKNESS: 0
HEMATOLOGIC/LYMPHATIC NEGATIVE: 1
DIZZINESS: 0
NEUROLOGICAL NEGATIVE: 1
CONSTITUTIONAL NEGATIVE: 1
COUGH: 0
EYES NEGATIVE: 1
PALPITATIONS: 0
SHORTNESS OF BREATH: 1
FATIGUE: 1
ABDOMINAL PAIN: 0

## 2024-03-04 ASSESSMENT — LIFESTYLE VARIABLES
TREMOR: NO TREMOR
SKIP TO QUESTIONS 9-10: 1
AGITATION: NORMAL ACTIVITY
VISUAL DISTURBANCES: NOT PRESENT
HEADACHE, FULLNESS IN HEAD: NOT PRESENT
PAROXYSMAL SWEATS: NO SWEAT VISIBLE
BLOOD PRESSURE: 148/110
HOW OFTEN DO YOU HAVE A DRINK CONTAINING ALCOHOL: NEVER
ORIENTATION AND CLOUDING OF SENSORIUM: ORIENTED AND CAN DO SERIAL ADDITIONS
AUDIT-C TOTAL SCORE: 0
ANXIETY: NO ANXIETY, AT EASE
NAUSEA AND VOMITING: NO NAUSEA AND NO VOMITING
HOW OFTEN DO YOU HAVE 6 OR MORE DRINKS ON ONE OCCASION: NEVER
TOTAL_SCORE: 5
SUBSTANCE_ABUSE_PAST_12_MONTHS: NO
AUDIT-C TOTAL SCORE: 0
CIWA TOTAL SCORE: 0
AUDITORY DISTURBANCES: NOT PRESENT
PRESCIPTION_ABUSE_PAST_12_MONTHS: NO
HOW MANY STANDARD DRINKS CONTAINING ALCOHOL DO YOU HAVE ON A TYPICAL DAY: PATIENT DOES NOT DRINK
PULSE: 98

## 2024-03-04 ASSESSMENT — COLUMBIA-SUICIDE SEVERITY RATING SCALE - C-SSRS
2. HAVE YOU ACTUALLY HAD ANY THOUGHTS OF KILLING YOURSELF?: NO
6. HAVE YOU EVER DONE ANYTHING, STARTED TO DO ANYTHING, OR PREPARED TO DO ANYTHING TO END YOUR LIFE?: NO
1. SINCE LAST CONTACT, HAVE YOU WISHED YOU WERE DEAD OR WISHED YOU COULD GO TO SLEEP AND NOT WAKE UP?: NO

## 2024-03-04 ASSESSMENT — PAIN SCALES - GENERAL
PAINLEVEL_OUTOF10: 1
PAINLEVEL_OUTOF10: 0 - NO PAIN
PAINLEVEL_OUTOF10: 3

## 2024-03-04 ASSESSMENT — PAIN DESCRIPTION - ORIENTATION: ORIENTATION: LEFT

## 2024-03-04 ASSESSMENT — PAIN - FUNCTIONAL ASSESSMENT
PAIN_FUNCTIONAL_ASSESSMENT: 0-10
PAIN_FUNCTIONAL_ASSESSMENT: 0-10

## 2024-03-04 ASSESSMENT — ACTIVITIES OF DAILY LIVING (ADL)
LACK_OF_TRANSPORTATION: PATIENT UNABLE TO ANSWER
LACK_OF_TRANSPORTATION: PATIENT UNABLE TO ANSWER

## 2024-03-04 ASSESSMENT — PAIN DESCRIPTION - LOCATION: LOCATION: ABDOMEN

## 2024-03-04 NOTE — NURSING NOTE
Pt arrived to unit via ambulance. Pt is restless and disorganized, calling out often, but pleasant and redirectable. Pt ate some yogurt, and then was medicated with Zyprexa 5 mg PO, melatonin 5mg, and Hydroxyzine 25 mg. Pt took the medications with no question. Pt was incontinent of stool and cleaned up accordingly.  Notable redness and yeast smell present in groin area. Pt complained of heartburn shortly after and was given mylanta. Medical consultant notified of his arrival. Pt is resting quietly in bed now. Bed alarm is activated and audible. Q15 minute checks to be maintained throughout shift for safety.

## 2024-03-04 NOTE — SIGNIFICANT EVENT
03/04/24 1601   Able to Complete Psychiatric Screening   Were you able to complete all the behavioral health screenings? No   Reason for Incomplete Psychiatric Screening Patient unwilling to respond  (Patient is somnolent and delusional: information on this assessment is provisional and/or provided by wife/collateral contact.)   Abuse Screen   Abuse Screen Adult   Are you or have you been threatened or abused physically, emotionally, or sexually by anyone? Unable to assess  (Patient is a  war  and served active duty; so it is possible but not definitive.)   Has anyone ever threatened to hurt your family or your pets? Unable to assess   Does anyone try to keep you from having/contacting other friends or doing things outside your home? Unable to assess   Do you feel UNSAFE going back to the place where you are living? Unable to assess   Do you feel anyone has exploited or taken advantage of you financially or of your personal property? Unable to assess   Are there any apparent signs of injuries/behaviors that could be related to abuse/neglect? Unable to assess   Trauma/Abuse Assessment   Physical Abuse Unable to assess  (Patient is somnolent and delusional: information on this assessment is provisional and/or provided by wife/collateral contact.  war  and has serve active duty in the past, so trauma history is likely.)   Verbal Abuse Unable to assess   Experienced Any of the Following Life Events Combat or exposure to a war-zone   Drug Screening   Have you used any substances (canabis, cocaine, heroin, hallucinogens, inhalants, etc.) in the past 12 months? No   Have you used any prescription drugs other than prescribed in the past 12 months? No   Is a toxicology screen needed? No   Audit Alcohol Screening   Q1: How often do you have a drink containing alcohol? Never  (per provisional data, patient has been sober for 3 years.)   Q2: How many drinks containing alcohol do you have on a  typical day when you are drinking? None   Q3: How often do you have six or more drinks on one occasion? Never   Audit-C Score 0   Patient Strengths/Barriers   Strengths (Must Choose Two) Support from family   Barriers Motivation level for treatment   Consults    Consult Needed No     This sw attempted to interview patient; he was somnolent and disoriented, delusional, was unable to answer or cooperate with questioning; Per Provisional data, he was admitted because of his family's concerns that he was hallucinating and also complaining of chest pain.  He is a 73 year old  white male without documented psychiatric history, except that he has PTSD, from  service/Active duty, per family;  He lives with his wife in [place]; family noted change in mood and personality within the past month, and has made suicidal statements in the last month stating he wants to shoot himself or to harm himself with a knife;  He is retired , living on a [employer] pension; has been sober from alcohol for 3 years; no legal charges; no other violence or abuse known (other than combat service in the );  has a grown daughter, supportive also;  Plan to get further collateral from family asap. Sw to follow.

## 2024-03-04 NOTE — CONSULTS
Consults    Reason For Consult  Medical management    History Of Present Illness  Donald Mcknight is a 73 y.o. male with a pertinent hx COPD and on 2-3 L, HTN, ARTEMIO, HPLD, depression, PTSD, GERD & obesity who presented from [place] [facility] for hallucinations/psychosis. He was admitted to U at [facility] and the Hospitalist was consulted for medical management. He was agitated and having delusions overnight. He also complained of nonreproducible midsternal chest pain/epigastric pain with subjective complaints of nausea and sweating the day before. He c/o BL shoulder pain. He has had the chest pain for 8 days. He is still having the chest pain & the initial ER workup was negative. Pepcid and tums did not relieve the chest pain. He has not seen cardiology in the past and has no previous workup documented. No other anginal symptoms. He was incontinent on arrival to the U. He is having difficulty ambulating per nursing staff. He is being treated for diverticulitis currently. CT showed moderate coronary calcifications. , HDL 34, LDL 63, trig 165.     Past Medical History  He has no past medical history of CHF (congestive heart failure) (CMS/HCC), Diabetes mellitus (CMS/HCC), or Renal insufficiency, COPD and on 2-3 L, HTN, ARTEMIO, HPLD, depression, PTSD, GERD, obesity.    Surgical History  Knee sx, femur surgery, Tonsillectomy.     Social History  He reports that he has quit smoking 5 yrs ago and smoked for 20 years. His smoking use included cigarettes. He has never used smokeless tobacco. He denies alcohol use and drug use.    Family History  Dad-DM    Allergies  Penicillin    Review of Systems  Constitutional: Negative for chills, diaphoresis, fever. + subjective sweating earlier.  HENT:  Negative for sore throat and trouble swallowing.    Eyes:  Negative for visual disturbance. No visual changes.   Respiratory: Neg for cough, neg shortness of breath and wheezing.    Cardiovascular: Positive for non-reproducible chest pain,  palpitations, leg swelling.   Gastrointestinal:  Negative for abdominal pain, constipation, + subjective diarrhea, nausea.    Genitourinary: +frequency, No dysuria and urgency.   Musculoskeletal:  Negative for back pain and neck pain. No focal weakness   Neurological: Negative for dizziness, tremors, seizures, syncope, facial asymmetry, speech difficulty, weakness, light-headedness, numbness and headaches.   Psychiatric/Behavioral:  Negative for confusion and hallucinations. The patient is not nervous/anxious.      Physical Exam  Constitutional:       Appearance: Normal appearance. He is obese.   HENT:      Head: Normocephalic and atraumatic.      Mouth/Throat:      Mouth: Mucous membranes are moist.      Pharynx: Oropharynx is clear. No oropharyngeal exudate or posterior oropharyngeal erythema.   Neck: No JVD or use of accessory muscles.  Eyes:      Extraocular Movements: Extraocular movements intact.      Conjunctiva/sclera: Conjunctivae normal.   Cardiovascular:      Rate and Rhythm: Normal rate and regular rhythm. Chest pain not reproducible.     Pulses: Normal pulses.      Heart sounds: Normal heart sounds. No murmur heard.  Pulmonary:      Effort: No respiratory distress. On 2-3 L NC.     Breath sounds: No wheezing or rhonchi. No tachypnea & labored breathing, No cyanosis.  Abdominal:      General: Bowel sounds are normal. There is no distension. Rectal nandini intact.     Palpations: Abdomen is soft. There is no mass.      Tenderness: There is no abdominal tenderness. There is no guarding or rebound.   Extremities: No swelling and palpable distal pulses.   Musculoskeletal:         General: No swelling or tenderness.      Comments: No focal weakness   Neurological:      Mental Status: He is alert and oriented to person, place, and time.      Cranial Nerves: No cranial nerve deficit.      Sensory: No sensory deficit.      Motor: No weakness.   Psychiatric:         Mood and Affect: Mood normal.         Behavior:  Behavior normal.      Last Recorded Vitals  There were no vitals taken for this visit.    Relevant Results  Scheduled medications  [START ON 3/5/2024] aspirin, 81 mg, oral, Daily  atorvastatin, 80 mg, oral, Nightly  calcium carbonate, 1,000 mg, oral, Daily  ciprofloxacin, 500 mg, oral, BID  famotidine, 20 mg, oral, Daily  FLUoxetine, 20 mg, oral, Daily  lidocaine, 1 patch, transdermal, Daily  lisinopril, 5 mg, oral, Daily  metroNIDAZOLE, 500 mg, oral, BID  nicotine, 1 patch, transdermal, Daily  nitroglycerin, 0.5 inch, transdermal, q6h during day  nystatin, , Topical, BID  pantoprazole, 40 mg, oral, Daily before breakfast  perflutren lipid microspheres, 0.5-10 mL of dilution, intravenous, Once in imaging  perflutren protein A microsphere, 0.5 mL, intravenous, Once in imaging  sulfur hexafluoride microsphr, 2 mL, intravenous, Once in imaging      Continuous medications     PRN medications  PRN medications: acetaminophen, alum-mag hydroxide-simeth, hydrOXYzine pamoate, melatonin, OLANZapine **OR** OLANZapine, oxygen, psyllium    Results for orders placed or performed during the hospital encounter of 03/03/24 (from the past 24 hour(s))   Troponin I, High Sensitivity   Result Value Ref Range    Troponin I, High Sensitivity 15 0 - 20 ng/L   Glucose, Fasting   Result Value Ref Range    Glucose, Fasting 93 74 - 99 mg/dL   Lipid Panel   Result Value Ref Range    Cholesterol 130 0 - 199 mg/dL    HDL-Cholesterol 33.9 mg/dL    Cholesterol/HDL Ratio 3.8     LDL Calculated 63 <=99 mg/dL    VLDL 33 0 - 40 mg/dL    Triglycerides 165 (H) 0 - 149 mg/dL    Non HDL Cholesterol 96 0 - 149 mg/dL   Potassium   Result Value Ref Range    Potassium 3.8 3.5 - 5.3 mmol/L     ECG 12 lead    Result Date: 3/4/2024  Sinus rhythm Abnormal R-wave progression, early transition Borderline repolarization abnormality Borderline prolonged QT interval    CT angio chest abdomen pelvis    Result Date: 3/2/2024  Interpreted By:  Dominik Perkins, STUDY: CT  ANGIO CHEST ABDOMEN PELVIS;  3/2/2024 5:47 pm   INDICATION: Signs/Symptoms:r/o dissection.   COMPARISON: CT scan of the chest, abdomen pelvis 02/27/2020.   ACCESSION NUMBER(S): [id number]   ORDERING CLINICIAN: ANGELICA GASPAR   TECHNIQUE: Axial CT images of the chest, abdomen and pelvis before and after intravenous administration of 100 mL of Omnipaque 350 using CT angiographic technique. Coronal and sagittal images are reconstructed.  3D reconstructions were obtained at a separate workstation.   FINDINGS: VASCULAR: AORTA: Unenhanced images demonstrate no evidence for intramural hematoma. No thoracic aortic aneurysm or dissection. No significant atherosclerotic disease. Arch vessels are widely patent.   Abdominal aorta is normal in caliber and course. No evidence for dissection or aneurysmal dilatation. Mild ostial calcification at origin of the celiac and superior mesenteric arteries. The SHERLY is patent. Right and single left renal artery are patent with mild ostial calcifications.   Scattered calcified plaque is noted in the common, internal and external iliac arteries without significant stenosis. Mild calcified plaque in the proximal femoral arteries without significant stenosis.   No acute pulmonary embolism to the proximal segmental arterial level.   CHEST:   HEART: Normal size. No pericardial effusion. Moderate coronary artery calcifications noted. MEDIASTINUM AND LORETTA: Mildly enlarged infraclavicular lymph nodes measure up to 11 short axis. No axillary, mediastinal or hilar adenopathy LUNG, PLEURA, LARGE AIRWAYS: Centrilobular and paraseptal emphysematous changes noted. No consolidation, effusion or pneumothorax. Several 3-4 mm bilateral pulmonary nodules are stable. CHEST WALL AND LOWER NECK: Small hiatal hernia. BONES: There is severe compression deformity of the T5 vertebral body with greater than 50% loss of vertebral body height and 4-5 mm retropulsion into the canal. Findings are stable from prior CT.  Remote rib fracture deformities noted. Multilevel degenerative changes of the spine.   ABDOMEN: Arterial phase of imaging limits evaluation of the solid viscera.   LIVER: Normal morphology. Liver is hypodense which could be perfusional or relate to component of steatosis. BILE DUCTS: Normal caliber. GALLBLADDER: Cholelithiasis. No gallbladder wall thickening. PANCREAS: Mild fatty atrophy of the pancreas. SPLEEN: Within normal limits. ADRENALS:  Within normal limits. KIDNEYS: Symmetric renal enhancement. No hydronephrosis or hydroureter. Mild bilateral perinephric stranding is nonspecific could be age related.   PELVIS:   REPRODUCTIVE ORGANS: Prostate gland is nonenlarged. Central prostatic calcifications noted. BLADDER: Within normal limits.   RETROPERITONEUM: Within normal limits. BOWEL: Small hiatal hernia. Stomach is under distended with apparent wall thickening. Visualized loops of bowel are without evidence for obstruction. Moderate stool burden. Diffuse scattered colonic diverticula without evidence for acute diverticulitis. Note is made of colonic interposition. No pneumatosis or portal venous gas. Normal appendix. PERITONEUM: No ascites or free air, no fluid collection.   ABDOMINAL WALL: Multilevel degenerative changes of the spine. Ghost tract in the right femur likely relates to removal of intramedullary griselda. BONES: No acute osseous abnormality.       No aortic aneurysm or dissection. Scattered calcified atheromatous plaque as described above. Hemodynamically significant stenosis.   No acute abnormality of the chest, abdomen and pelvis.   Mildly enlarged infraclavicular lymph nodes measure up to 11 short axis. Findings are stable from prior exam and could be reactive. Correlate clinically.   Advanced centrilobular and paraseptal emphysematous changes noted. No consolidation.   Several 3-4 mm bilateral pulmonary nodules are stable. Continue follow-up per Fleischner guidelines.   Cholelithiasis. No  gallbladder wall thickening.   Diffuse scattered colonic diverticula without evidence for acute diverticulitis.   Additional findings as above.   MACRO: None   Signed by: Dominik Perkins 3/2/2024 6:58 PM Dictation workstation:   [id number]    CT head wo IV contrast    Result Date: 3/2/2024  Interpreted By:  Dominik Perkins, STUDY: CT HEAD WO IV CONTRAST;  3/2/2024 5:47 pm   INDICATION: Signs/Symptoms:family concerns for new onset hallucinations.   COMPARISON: CT scan of the head 01/30/2012.   ACCESSION NUMBER(S): [id number]   ORDERING CLINICIAN: ANGELICA GASPAR   TECHNIQUE: Axial noncontrast CT images of the head.   FINDINGS: BRAIN PARENCHYMA: Small hypodense focus in the right basal ganglia is new from prior CT and likely relates to sequela of remote lacunar infarct. Gray-white matter interfaces are otherwise preserved. No mass, mass effect or midline shift. Moderate deep and periventricular white matter hypodensities are nonspecific, but favored to represent chronic small vessel ischemic changes.   HEMORRHAGE: No acute intracranial hemorrhage. VENTRICLES and EXTRA-AXIAL SPACES: Mild volume loss with prominence of the ventricles and sulci. EXTRACRANIAL SOFT TISSUES: Within normal limits. PARANASAL SINUSES/MASTOIDS: The visualized paranasal sinuses and mastoid air cells are aerated. CALVARIUM: No depressed skull fracture. No destructive osseous lesion.   OTHER FINDINGS: Atherosclerotic calcification of the carotid siphons.       No acute intracranial abnormality.   Chronic changes as noted above.   MACRO: None   Signed by: Dominik Perkins 3/2/2024 6:31 PM Dictation workstation:   [id number]    XR chest 1 view    Result Date: 3/2/2024  STUDY: Chest Radiograph;  3/2/2024 at 2:55 PM. INDICATION: Chest pain. COMPARISON: CTA chest 2/27/2024, 1/27/2024; XR chest 2/15/2024, 1/27/2024. ACCESSION NUMBER(S): [id number] ORDERING CLINICIAN: ANGELICA GASPAR TECHNIQUE:  Frontal chest was obtained at 14:55 hours (two image).  FINDINGS: CARDIOMEDIASTINAL SILHOUETTE: Cardiomediastinal silhouette is normal in size and configuration.  LUNGS: Bronchial wall thickening is present.  ABDOMEN: No remarkable upper abdominal findings.  BONES: No acute osseous changes.    Suspect bronchitis. Signed by Aleksandr Harry II, MD      Assessment/Plan   Psychosis/Hallucinations/Delusions/HX PTSD/HS Depression  Psych primary and to f/u regarding routine labs ordered  5 mg additional Zyprexa  Rec a MOCA assessment  Supportive care    Hypokalemia  Recheck in morning    Chest Pain, Cause Unknown/HX CHF  -Ongoing chest pain, ?Angina, HEART score 5  -Not previously seen by cardiology and no previous ECHO  Troponin x's 3 neg  EKG reviewed and no new ST changes  Nitroglycerin/Aspirin/High dose Statin/Lidoderm patch  Lipid panel-f/u  Echo-F/U  Cardiology consult-appreciate recs    Diverticulitis  -No diarrhea overnight  -Nursing staff to notify if having more diarrhea  Contact Plus precautions  Cipro/flagyl    Gen Weakness  PT/OT/SW    Intertrigo  Nystatin    GERD  Pepcid and PPI    HTN   Lisinopril    HPLD  Statin  Lipid panel    ARTEMIO/COPD 2-3 L NC at baseline  CPAP, oxygen, pulse ox  PRN albuterol    Obesity  Wt loss encouraged    Incidental CT Findings  Stable enlarged infraclavicular lymph nodes/3-4 pulm nodules-F/U pulmonary outpt  Chololithiasis without evidence for cholecystitis-F/U with surgery if becomes painful/symptomatic    DVT PX  Encourage ambulation    Teresa Myers, APRN-CNP  75 min spent on pt care, chart review, placing orders and updating staff.

## 2024-03-04 NOTE — CARE PLAN
The patient's goals for the shift include JESS    The clinical goals for the shift include treatment compliant    Over the shift, the patient did not make progress toward the following goals. Barriers to progression include acuteness of illness. Recommendations to address these barriers include positive reinforcement .      Problem: Sensory Perceptual Alteration as Evidenced by  Goal: Cooperates with admission process  Outcome: Progressing  Goal: Patient/Family participate in treatment and discharge plans  Outcome: Progressing  Goal: Patient/Family verbalizes awareness of resources  Outcome: Progressing  Goal: Participates in unit activities  Outcome: Progressing  Goal: Discusses signs/symptoms of illness/treatment options  Outcome: Progressing  Goal: Initiates reality-based interactions  Outcome: Progressing  Goal: Able to discuss content of hallucinations/delusions  Outcome: Progressing  Goal: Notifies staff when experiencing hallucinations/delusions  Outcome: Progressing  Goal: Verbalizes reduction in hallucinations/delusions  Outcome: Progressing  Goal: Will not act on psychotic perception  Outcome: Progressing  Goal: Understands least restrictive measures  Outcome: Progressing  Goal: Free from restraint events  Outcome: Progressing     Problem: Altered Thought Processes as Evidenced by  Goal: STG - Desires improvement in ability to think and concentrate  Outcome: Progressing  Goal: STG - Participates in Occupational Therapy and other cognitive assessments  Outcome: Progressing     Problem: Potential for Harm to Self or Others  Goal: Cooperates with admission process  Outcome: Progressing  Goal: Participates in unit activities  Outcome: Progressing  Goal: Patient/Family participate in treatment and discharge plans  Outcome: Progressing  Goal: Identifies deescalation techniques  Outcome: Progressing  Goal: Understands least restrictive measures  Outcome: Progressing  Goal: Identifies stressors that lead to harmful  behaviors  Outcome: Progressing  Goal: Notifies staff when experiencing harmful thoughts toward self/others  Outcome: Progressing  Goal: Denies harm toward self or others  Outcome: Progressing  Goal: Free from restraint events  Outcome: Progressing     Problem: Educational/Scholastic Disruption  Goal: Meets educational requirements during hospitalization  Outcome: Progressing  Goal: Attends class without disruptive behavior  Outcome: Progressing  Goal: Completes daily assignments  Outcome: Progressing     Problem: Ineffective Coping  Goal: Cooperates with admission process  Outcome: Progressing  Goal: Identifies ineffective coping skills  Outcome: Progressing  Goal: Identifies healthy coping skills  Outcome: Progressing  Goal: Demonstrates healthy coping skills  Outcome: Progressing  Goal: Participates in unit activities  Outcome: Progressing  Goal: Patient/Family participate in treatment and discharge plans  Outcome: Progressing  Goal: Patient/Family verbalizes awareness of resources  Outcome: Progressing  Goal: Understands least restrictive measures  Outcome: Progressing  Goal: Free from restraint events  Outcome: Progressing     Problem: Alteration in Sleep  Goal: STG - Reports nightly sleep, duration, and quality  Outcome: Progressing  Goal: STG - Identifies sleep hygiene aids  Outcome: Progressing  Goal: STG - Informs staff if unable to sleep  Outcome: Progressing  Goal: STG - Attends breathing and relaxation group  Outcome: Progressing     Problem: Potential for Substance Withdrawal  Goal: Verbalizes signs/symptoms of withdrawal  Outcome: Progressing  Goal: Reports signs/symptoms of withdrawal  Outcome: Progressing  Goal: Free of withdrawal symptoms  Outcome: Progressing     Problem: Anxiety  Goal: Patient/family understands admission protocols  Outcome: Progressing  Goal: Attempts to manage anxiety with help  Outcome: Progressing  Goal: Verbalizes ways to manage anxiety  Outcome: Progressing  Goal: Implements  measures to reduce anxiety  Outcome: Progressing  Goal: Free from restraint events  Outcome: Progressing     Problem: Self Care Deficit  Goal: STG - Patient completes hygiene  Outcome: Progressing  Goal: Increase group attendance  Outcome: Progressing  Goal: Accepts need for medications  Outcome: Progressing  Goal: STG - Goes to and eats meals independently in dining room 100% of time  Outcome: Progressing     Problem: Defensive Coping  Goal: Cooperates with admission process  Outcome: Progressing  Goal: Identifies reckless/dangerous behavior  Outcome: Progressing  Goal: Identifies stressors that lead to reckless/dangerous behavior  Outcome: Progressing  Goal: Discusses and identifies healthy coping skills  Outcome: Progressing  Goal: Demonstrates healthy coping skills  Outcome: Progressing  Goal: Identifies appropriate social interaction  Outcome: Progressing  Goal: Demonstrates appropriate social interactions  Outcome: Progressing  Goal: Patient/Family verbalizes awareness of resources  Outcome: Progressing  Goal: Discusses signs/symptoms of illness/treatment options  Outcome: Progressing  Goal: Patient/Family participate in treatment and discharge plans  Outcome: Progressing  Goal: Understands least restrictive measures  Outcome: Progressing  Goal: Free from restraint events  Outcome: Progressing     Problem: Pain  Goal: My pain/discomfort is manageable  Outcome: Progressing

## 2024-03-04 NOTE — NURSING NOTE
Pt was in bed most of the day he was trying to get out of bed earlier today and then this afternoon he was calling out. Pt has slept most of this shift. Pt was pleasant , however unable to answer any questions, he is very disorganized it was passed on that pt hasn't slept in a few days , he is resting in bed with his oxygen on .

## 2024-03-04 NOTE — CONSULTS
Inpatient consult to Cardiology  Consult performed by: ЕЛЕНА Kearney  Consult ordered by: ЕЛЕНА Garnett  Reason for consult: chest pain          History Of Present Illness  Donald Mcknight is a 73 y.o. male presenting to [facility] with complaints of chest pain. He originally presented to the ER with chest and had a complete workup and was going to be discharged. He started becoming agitated and upset that no one was doing anything about his chest pain. He then made a suicidal comment and EPAT was called. He was admitted to the [facility] here at [facility]. He is currently resting in the bed, drowsy but able to answer questions. Reports the chest pain is in the middle of his chest and worse with deep breathing. Pain to chest with palpation.     Lab work showed glucose 90, sodium 139, potassium 2.8, BUN/creatinine 15/1.14, LFTs elevated, magnesium 1.8, troponin negative, WBCs 8.5, H&H 15.9/46, chest x-ray showed possible bronchitis, CT of the head negative, CTA of the chest negative for PE showed emphysema and cholelithiasis.    EKG showed sinus rhythm, no acute ischemic changes.        Past Medical History  COPD and on 2-3 L, HTN, ARTEMIO, HPLD, depression, PTSD, GERD & obesity     Surgical History  Knee sx, femur surgery, Tonsillectomy.      Social History  He reports that he has quit smoking. His smoking use included cigarettes. He has never used smokeless tobacco. No history on file for alcohol use and drug use.    Family History  No family history on file.     Allergies  Penicillin    Review of Systems  Review of Systems   Constitutional:  Positive for fatigue. Negative for chills and fever.   Respiratory:  Positive for shortness of breath. Negative for cough.    Cardiovascular:  Positive for chest pain. Negative for palpitations and leg swelling.   Gastrointestinal:  Negative for abdominal pain.   Neurological:  Negative for dizziness and weakness.   All other systems reviewed and are  "negative.         Physical Exam  Constitutional: Well developed, drowsy but able to answer all questions, no distress, alert and cooperative  Eyes: PERRL, EOMI, clear sclera  ENMT: mucous membranes moist, no apparent injury, no lesions seen  Head/Neck: Neck supple, no apparent injury, thyroid without mass or tenderness, No JVD, trachea midline, no bruits  Respiratory/Thorax: Patent airways, CTAB, normal breath sounds with good chest expansion, thorax symmetric, pain to center of chest with palpation  Cardiovascular: Regular, rate and rhythm, no murmurs, 2+ equal pulses of the extremities, normal S 1and S 2  Gastrointestinal: Nondistended, soft, non-tender, no rebound tenderness or guarding, no masses palpable, no organomegaly, +BS, no bruits  Musculoskeletal: ROM intact, no joint swelling, normal strength  Extremities: normal extremities, no cyanosis edema, contusions or wounds, no clubbing  Neurological: drowsy  Lymphatic: No significant lymphadenopathy  Skin: Warm and dry, no lesions, no rashes       Last Recorded Vitals  Blood pressure 168/85, pulse 102, temperature 37.7 °C (99.9 °F), resp. rate 18, height 1.854 m (6' 0.99\"), weight 117 kg (257 lb 15 oz), SpO2 90 %.    Relevant Results    Scheduled medications  [START ON 3/5/2024] aspirin, 81 mg, oral, Daily  atorvastatin, 80 mg, oral, Nightly  calcium carbonate, 1,000 mg, oral, Daily  carvedilol, 6.25 mg, oral, BID  ciprofloxacin, 500 mg, oral, BID  divalproex, 250 mg, oral, BID  famotidine, 20 mg, oral, Daily  FLUoxetine, 20 mg, oral, Daily  lidocaine, 1 patch, transdermal, Daily  lisinopril, 5 mg, oral, Daily  metroNIDAZOLE, 500 mg, oral, BID  nicotine, 1 patch, transdermal, Daily  nitroglycerin, 0.5 inch, transdermal, q6h during day  nystatin, , Topical, BID  pantoprazole, 40 mg, oral, Daily before breakfast  perflutren lipid microspheres, 0.5-10 mL of dilution, intravenous, Once in imaging      Continuous medications     PRN medications  PRN medications: " acetaminophen, albuterol, alum-mag hydroxide-simeth, hydrOXYzine pamoate, melatonin, OLANZapine **OR** OLANZapine, oxygen, psyllium    Results for orders placed or performed during the hospital encounter of 03/03/24 (from the past 24 hour(s))   Troponin I, High Sensitivity   Result Value Ref Range    Troponin I, High Sensitivity 15 0 - 20 ng/L   ECG 12 lead   Result Value Ref Range    Ventricular Rate 95 BPM    Atrial Rate 95 BPM    IN Interval 172 ms    QRS Duration 84 ms    QT Interval 368 ms    QTC Calculation(Bazett) 462 ms    P Axis 66 degrees    R Axis 22 degrees    T Axis 46 degrees    QRS Count 15 beats    Q Onset 220 ms    P Onset 134 ms    P Offset 200 ms    T Offset 404 ms    QTC Fredericia 428 ms   Glucose, Fasting   Result Value Ref Range    Glucose, Fasting 93 74 - 99 mg/dL   Lipid Panel   Result Value Ref Range    Cholesterol 130 0 - 199 mg/dL    HDL-Cholesterol 33.9 mg/dL    Cholesterol/HDL Ratio 3.8     LDL Calculated 63 <=99 mg/dL    VLDL 33 0 - 40 mg/dL    Triglycerides 165 (H) 0 - 149 mg/dL    Non HDL Cholesterol 96 0 - 149 mg/dL   Potassium   Result Value Ref Range    Potassium 3.8 3.5 - 5.3 mmol/L       Transthoracic Echo (TTE) Complete    (Results Pending)       No echocardiogram results found for the past 12 months       Assessment/Plan   Chest pain  -reproducible with palpation->noncardiac  -Troponin negative  -I reviewed the EKG, no acute changes, ST elevation or depression  -CXR showed possible bronchitis  -Lipid panel reviewed  -Check echo  -Suggest outpt stress test  -Cont asa, statin, coreg    2. Hypokalemia  -Supplemented  -Monitor    3. Hypertension  -BP elevated  -2gm na diet  -cont lisinopril  -Start Coreg 6.25mg BID  -monitor    4. Hyperlipidemia  -Cont statin  -Lipid panel reviewed    5. COPD  -Wears oxygen at baseline  -Appears stable      Alecia Roche, SHARON-CNP

## 2024-03-04 NOTE — H&P
"Physician Certification/Re-Certification: INITIAL   I certify that the inpatient psychiatric hospital admission is medically necessary for:  treatment which could reasonably be expected to improve the patient's condition that could not be provided in a less restrictive setting   I estimate the period of hospitalization are necessary for treatment of this patient will be:  7-14 days   My plans for post hospital care for this patient are:  home           Chief Complaint: (Patient not easily aroused for interview currently)    History Of Present Illness  Donald Mcknight is a 73 y.o. year old male patient who was brought in by his wife and daughter due to a change in personality over the past month, and suicidal thoughts (asking for a gun stating \"I want to shoot myself\" and asking for a knife so he can kill himself - see EPAT note below). On admission, Donald was not able to be aroused to participate in the evaluation currently. Donald has been intermittently agitated (throwing objects including his clothing), taking his clothing off, and incontinent of urine and stool.               Per EPAT Assessment of 1-2024:  The patient is a 73 yr old male with no psychiatric history. He presents in the ED with concerns of chest pain and and family is concerned he is hallucinating. According to ED notes, the patient’s family believes that the patient may be hallucinating due to saying “odd and off the wall things” though he doesn’t appear to be showing signs of any hallucinating. The patient recently had covid about 2 weeks ago and has been having health difficulties since. The patient also has been refusing to eat recently with no medical complaints of nausea or vomiting. The patient scored low risk for suicide on the C-SSRS when assessed in the ED. The patient was referred to [facility] for [facility] evaluation.     The patient is a 73 yr old male with a history of PTSD from war in the [place] . He presents in the ED with " concerns of suicidal ideation and psychosis. [facility]   The patient denies any SI/HI/VH. He endorses that he does hear voices sometimes. The patient pointed to the corner of the ceiling and said, “For example, I hear people talking in that corner”. The patient endorsed that he was trying synthetic marijuana gummies but “threw them away”. He declines needing any substance use treatment. He also reports that he hasn’t had a drink of alcohol in 3 years. The patient did appear to be a little disoriented and reports that he “just needs to sleep because I haven’t slept in 8 days because of my restless leg syndrome”. He also reports that he doesn’t have an appetite and won’t eat. When asked if he isn’t eating because his stomach hurts the patient said “Yeah”. Then he said, “I just need to sleep”        Collateral: This writer spoke to the patient’s wife and daughter at the same time on the phone. They are highly worried about the patient. They report an acute change in personality and behavior within the past month. The patient did have covid 2 weeks ago but the acute change has been going on for a month now. They report that the patient has asked for a gun saying, “I want to shoot myself.” They also report him asking for a knife so that he could kill himself. The patient’s daughter and wife report that he has had flashbacks in the past but now they are a lot more frequent. The daughter and wife report that the patient has been yelling and screaming things at them like, “Get in the foxhole!” The patient’s daughter also reports that he has always “dressed to the nines” and took care of his hygiene. She reports a rapid decline in patient taking care of himself. She reports, “I don’t know who this person is. My dad needs help.” The patient’s wife reports that the patient does have a gun but it is in a safe and she has the key and that the patient does not know where the key  is.        The patient scored low risk for suicide on the C-SSRS when assessed in the ED. The patient scored high risk for suicide on the C-SSRS after taking into consideration reports from the patient’s wife and daughter. The patient is also experiencing an acute change in personality/behavior. Dr. Koroma also reports that the patient has come in multiple times with chest pain and they have done all the medical work ups and testing for the patient and nothing medically has been found. The patient meets criteria for inpatient psychiatric admission. Watkins MD agrees with inpatient psychiatric admission.        PSYCHIATRIC REVIEW OF SYMPTOMS  Depressive Symptoms: suicidal ideation or plan (see EPAT note).  Manic Symptoms:  None  Anxiety Symptoms: exposure to traumatic event Trauma Symptoms: re-experiencing traumatic event  Psychotic Symptoms:  Possible hallucinations.  Delirium/Altered Mental Status Symptoms: Acute, inattention, Recent change in mental status.  Other Symptoms/Concerns:  None      Past Medical History  History reviewed. No pertinent past medical history.     Code Status: Personally discussed with patient on admission, and is currently a full code.        Past Psychiatric History: 1) Past Dx: PTSD (combat related).                                            2) No prior psychiatric hospitalizations                                            3) No prior suicide attempts                                            4) No prior SIB                                            5) No prior rehab treatment programs                                            6) Outpt MH Tx: None.                                            7) Current psych meds: None.    Past Psychiatric Meds: 1) None        Family History: 1) Unknown                             2) Unknown if suicides in the family.      Substance Use History: 1) Tobacco - Former smoker                                          2) ETOH - reportedly last  drink was 3 years ago.                                          3) Cannabis - Yes (synthetic gummies)                                          4) No other illicit drug use.      Social History  Social History     Socioeconomic History    Marital status:      Spouse name: Not on file    Number of children: Not on file    Years of education: Not on file    Highest education level: Not on file   Occupational History    Not on file   Tobacco Use    Smoking status: Former     Types: Cigarettes    Smokeless tobacco: Never   Substance and Sexual Activity    Alcohol use: Not on file    Drug use: Not on file    Sexual activity: Not on file   Other Topics Concern    Not on file   Social History Narrative    Not on file     Social Determinants of Health     Financial Resource Strain: Patient Unable To Answer (3/4/2024)    Overall Financial Resource Strain (CARDIA)     Difficulty of Paying Living Expenses: Patient unable to answer   Food Insecurity: Not on file   Transportation Needs: Patient Unable To Answer (3/4/2024)    PRAPARE - Transportation     Lack of Transportation (Medical): Patient unable to answer     Lack of Transportation (Non-Medical): Patient unable to answer   Physical Activity: Not on file   Stress: Not on file   Social Connections: Not on file   Intimate Partner Violence: Not on file   Housing Stability: Patient Unable To Answer (3/4/2024)    Housing Stability Vital Sign     Unable to Pay for Housing in the Last Year: Patient unable to answer     Number of Places Lived in the Last Year: 1     Unstable Housing in the Last Year: Patient unable to answer        Other Social History:  The patient was in the  in the past, and saw combat. He also is  and lives with his wife. They have at least one adult daughter.   No legal history.       Trauma History  Victim, Perpetrator or Witness of Abuse: No significant physical, sexual, emotional abuse, neglect or trauma history was identified on  initial assessment of the patient. This shall not be an active focus of treatment, but will continue to be reassessed throughout admission. (3) Patient unable to give any meaningful answers.   Physical Abuse: No  Sexual Abuse: No  Verbal / Emotional Abuse / Bullying (+Cyber): No   Financial Abuse: No  Domestic Violence: No      Review of Systems   Review of Systems   Constitutional: Negative.    HENT: Negative.     Eyes: Negative.    Respiratory:          1) ARTEMIO  2) COPD  3) currently on 2-3 Liters O2   Cardiovascular:         1) HTN   Gastrointestinal:         1) Diverticulitis  2) GERD   Endocrine:        1) Hypokalemia   Genitourinary: Negative.    Musculoskeletal: Negative.    Skin: Negative.    Allergic/Immunologic: Negative.    Neurological: Negative.    Hematological: Negative.    Psychiatric/Behavioral:  Positive for suicidal ideas.         Cranial Nerve Exam  CN II - normal  CN III - normal  CN IV - normal  CN V - normal  CN VI - normal  CN VII - normal  CN VIII - normal  CN IX - normal  CN X - normal  CN XI - normal  CN XII - normal        Physical Exam  Mental Status Exam:   General: Laying in bed, unclothed except for an adult pull-up. Unable to interact with this interviewer.   Appearance: Appears stated age.   Attitude: Calm, cooperative.   Behavior: Appropriate eye contact.   Motor Activity: No agitation or retardation. No EPS/TD. Impaired gait and station. Normal muscle tone and bulk.   Speech: Regular rate, rhythm, volume and tone, spontaneous, fluent. Non-pressured.   Mood: (no response from patient)   Affect: Labile.   Thought Process: Disorganized.   Thought Content: Unable to evaluate.   Thought Perception: Currently he does not appear to be responding to hallucinatory stimuli.   Cognition: Alert, oriented x 0.    Insight: Impaired as patient does not recognize symptoms of  illness and need for recommended treatments.    Judgment: Impaired, as patient can not make reasonable decisions about  ordinary activities of daily living and necessary medical care recommendations.       Functional Estimates  Estimate of Intelligence: average.   Estimate of Capacity for Activities of Daily Living: independent.       Last Recorded Vitals  Visit Vitals  /85 (Patient Position: Lying)   Pulse 102   Temp 37.7 °C (99.9 °F)   Resp 18        Relevant Results  Results for orders placed or performed during the hospital encounter of 03/03/24 (from the past 24 hour(s))   Troponin I, High Sensitivity   Result Value Ref Range    Troponin I, High Sensitivity 15 0 - 20 ng/L   ECG 12 lead   Result Value Ref Range    Ventricular Rate 95 BPM    Atrial Rate 95 BPM    CA Interval 172 ms    QRS Duration 84 ms    QT Interval 368 ms    QTC Calculation(Bazett) 462 ms    P Axis 66 degrees    R Axis 22 degrees    T Axis 46 degrees    QRS Count 15 beats    Q Onset 220 ms    P Onset 134 ms    P Offset 200 ms    T Offset 404 ms    QTC Fredericia 428 ms   Glucose, Fasting   Result Value Ref Range    Glucose, Fasting 93 74 - 99 mg/dL   Lipid Panel   Result Value Ref Range    Cholesterol 130 0 - 199 mg/dL    HDL-Cholesterol 33.9 mg/dL    Cholesterol/HDL Ratio 3.8     LDL Calculated 63 <=99 mg/dL    VLDL 33 0 - 40 mg/dL    Triglycerides 165 (H) 0 - 149 mg/dL    Non HDL Cholesterol 96 0 - 149 mg/dL   Potassium   Result Value Ref Range    Potassium 3.8 3.5 - 5.3 mmol/L         Allergies  Allergies   Allergen Reactions    Penicillin Anaphylaxis       Medications  Scheduled medications  [START ON 3/5/2024] aspirin, 81 mg, oral, Daily  atorvastatin, 80 mg, oral, Nightly  calcium carbonate, 1,000 mg, oral, Daily  carvedilol, 6.25 mg, oral, BID  ciprofloxacin, 500 mg, oral, BID  famotidine, 20 mg, oral, Daily  FLUoxetine, 20 mg, oral, Daily  lidocaine, 1 patch, transdermal, Daily  lisinopril, 5 mg, oral, Daily  metroNIDAZOLE, 500 mg, oral, BID  nicotine, 1 patch, transdermal, Daily  nitroglycerin, 0.5 inch, transdermal, q6h during day  nystatin, ,  Topical, BID  pantoprazole, 40 mg, oral, Daily before breakfast  perflutren lipid microspheres, 0.5-10 mL of dilution, intravenous, Once in imaging      Continuous medications     PRN medications  PRN medications: acetaminophen, albuterol, alum-mag hydroxide-simeth, hydrOXYzine pamoate, melatonin, OLANZapine **OR** OLANZapine, oxygen, psyllium    OARRS Report reviewed on 3- (score = 100).        PSYCHIATRIC RISK ASSESSMENT  Violence Risk Assessment: male and  history or weapons training  Acute Risk of Harm to Others is Considered: low   Suicide Risk Assessment: current psychiatric illness, male, and suicidal ideations  Protective Factors against Suicide: marriage/partnership and positive family relationships  Acute Risk of Harm to Self is Considered: moderate        Diagnostic Impression/Plan:  1) Other Specified Depressive Disorder       Plan: 1) Obtain more collateral information    Discussed potential risks, benefits, and alternatives to medications with patient, who consented to the above medications.    2) Delirium, acute       Plan: 1) trial Depakote 250 mg Q17:00 and Q21:00    3) HTN       Plan: 1) IM service to follow and manage.    4) COPD       Plan: 1) IM service to follow and manage.    5) ARTEMIO       Plan: 1) IM service to follow and manage.    6) Diverticulitis       Plan: 1) IM service to follow and manage.    7) GERD       Plan: 1) IM service to follow and manage.              XIOMY Tiwari MD

## 2024-03-04 NOTE — PROGRESS NOTES
Occupational Therapy                 Therapy Communication Note    Patient Name: Donald Mcknight  MRN: [id number]  Today's Date: 3/4/2024     Discipline: Occupational Therapy    Missed Visit Reason: Missed Visit Reason: Other (Comment) (Discussed OT eval with staff. Pt sleeping for most of the day, received medication for agitation early in the morning. No OT eval completed today)    Missed Time: Attempt

## 2024-03-04 NOTE — NURSING NOTE
Pt called staff into the room because he was incontinent of urine. Pt was cleaned up.  Pt reports that he cannot walk at all, and this incontinence is new for him. Pt is pleasant, but incongruent. Reporting pain 10/10 in chest. He was given Tums and Pepcid at 23:30, which did not provide relief. Teresa Myers NP assessed pt at the bedside. Order for CPAP placed, RT came up to the unit to place him on one. Will continue to monitor closely.

## 2024-03-04 NOTE — GROUP NOTE
Group Topic: Stress Reduction/Relaxation   Group Date: 3/4/2024  Start Time: 1530  End Time: 1610  Facilitators: TAN Galeano   Department: [facility] REHAB THERAPY VIRTUAL    Number of Participants: 9   Group Focus: coping skills, mindfulness, other grounding techniques, and relaxation  Treatment Modality: Other: Recreation Therapy  Interventions utilized were leisure development, patient education, and support  Purpose: coping skills and other: relaxation skills    Name: Donald Mcknight YOB: 1950   MR: [id number]      Facilitator: Recreational Therapist  Level of Participation: did not attend  Progress: None  Comments: Session included education on grounding techniques for managing trauma symptoms. Participants were provided opportunities to share effective coping strategies and ways they utilize relaxation and/or grounding skills. Session reviewed breathing techniques, sensory grounding, sensory stimulation, using slogans/reciting passages, categorizing, and grounding affirmations/statements.     Participation: Patient remained in his room during the time of this session.   Plan: continue with services

## 2024-03-04 NOTE — PROGRESS NOTES
REHAB Therapy Assessment & Treatment    Patient Name: Donald Mcknight  MRN: [id number]  Today's Date: 3/4/2024    Recreation Therapy assessment completed this day. Information provided is from 1:1 patient engagement and chart review.     Activity Assessment:  Initial Assessment  Attention Span: 5 Minutes or less  Cognitive Behavior Status/Orientation: Person  Crisis Triggers: Other (Comment) ( history (discussing active duty))  Emotional Concerns/Mood/Affect: Bizarre, Confused, Distracted  Hearing: Adequate  Memory: Immediate memory deficits  Motivation Level: Maximum encouragement needed  Negative Coping Skills: Other (Comment) (unknown at this time)  Speech/Communication/Socialization: Verbal, Understands spoken word, Difficult to understand, Other (Comment) (word finding difficulties, garbled/mumbles)  Vision: Undetermined at this time    Leisure Survey:  [facility]  Activity Preference: 1:1  Activity Tolerance: Poor 0-15 minutes  At Home ADL Deficits:  (recent decline per family (per chart))  Barriers to Leisure Participation: Behaviors, Cognition, Communication, Thought process  Community Resources: Other (Comment) (unaware, difficult to assess at this time)  Creative Activities: Other (Comment) (unable to assess at this time)  Education/School:  (unknown)  Following Directions: Unable to follow simple commands  Leisure Interests: Unable to identify interests  Living Arrangement: Spouse  Motivators for Recreation/Leisure Involvement: Fun/entertainment  Outdoor Activities: Other (Comment) (unknown)  Passive Games: Other (Comment) (unknown)  Patient Strengths:  (unable to assess at this time)  Patient Weakness:  (cognition, disorganized, bizarre, speech difficulties)  Physial Activity: Other (Comment) (unknown)  Social/Group Activities: Parties/programs/social events  Solitary Activities: Watch/listen television, Music, Family oriented  Special Hobbies:  (nothing  reported)  Spectator Events: Other (Comment) (unable to assess)  Transportation: Other (Comment) (unknown)  Work/Volunteer:  (retired)  Additional Comments: Patient was met with in his room 1:1 and at his bedside. Mr. Mcknight was alert and oriented x1. He identified that he likes to be called “Riki”. He was unable to explain why or how he got the name “Riki”. Patient was in bed lying/resting and wearing only a diaper. Patient reported that he was “tired” and “didn’t want to be cooped up in a small room”. He shared that he was at a “shin dig last night” and that he gets most of his care “upstairs”. Patient was disorganized, difficult to understand, and bizarre throughout the assessment. Patient was pleasant and smiling frequently. He was unable to fully complete the RT assessment because of his confusion, speech difficulties, and cognitive functioning. Further RT assessment may be required.      Encounter Problems       Encounter Problems (Active)       Cognition  RT       Attention       Start:  03/04/24    Expected End:  03/11/24               Emotional  RT       Behaviors       Start:  03/04/24    Expected End:  03/11/24               Mindfulness  RT       To focus my attention on the present       Start:  03/04/24    Expected End:  03/11/24

## 2024-03-04 NOTE — NURSING NOTE
"Pt has been increasingly restless all night. Thrashing around in bed, taking clothes off, sitting up and down, urinating on self, taking CPAP and O2 off, throwing CPAP mask, throwing items. Multiple somatic complaints. Troponin and EKG ordered, they were WNL. Pt given a total of 15 mg Zyprexa this shift, last one at being IM at 03:30. MD called and a one time dose of Depakote 500 mg PO was ordered and given at 04:56. Pt continues to be restless and disorganized. Pt is hallucinating there being \"monkeys and cheerleaders in the room\".  "

## 2024-03-04 NOTE — SIGNIFICANT EVENT
03/04/24 1616   Discharge Planning   Living Arrangements Spouse/significant other   Support Systems Spouse/significant other   Assistance Needed Need evaluation, possibly cognitive evaluation to determine independent level of functioning.   Type of Residence Private residence   Who is requesting discharge planning? Provider   Patient expects to be discharged to: to be determined once stabilized and he has been cognitively evaluated.   Financial Resource Strain   How hard is it for you to pay for the very basics like food, housing, medical care, and heating? Pt Unable   Housing Stability   In the last 12 months, was there a time when you were not able to pay the mortgage or rent on time? Pt Unable   Transportation Needs   In the past 12 months, has lack of transportation kept you from medical appointments or from getting medications? Pt Unable   In the past 12 months, has lack of transportation kept you from meetings, work, or from getting things needed for daily living? Pt Unable   Patient Choice   Patient / Family choosing to utilize agency / facility established prior to hospitalization No     This sw attempted to interview patient; he was somnolent and disoriented, delusional, was unable to answer or cooperate with questioning; Per Provisional data, he was admitted because of his family's concerns that he was hallucinating and also complaining of chest pain.  He is a 73 year old  white male without documented psychiatric history, except that he has PTSD, from  service/Active duty, per family;  He lives with his wife in [place]; family noted change in mood and personality within the past month, and has made suicidal statements in the last month stating he wants to shoot himself or to harm himself with a knife;  He is retired , living on a [employer] pension; has been sober from alcohol for 3 years; no legal charges; no other violence or abuse known (other than combat service in the );   has a grown daughter, supportive also;  Plan to get further collateral from family asap. Sw to follow.

## 2024-03-04 NOTE — GROUP NOTE
Group Topic: Leisure Skills   Group Date: 3/4/2024  Start Time: 1400  End Time: 1440  Facilitators: TAN Galeano   Department: [facility] REHAB THERAPY VIRTUAL    Number of Participants: 8   Group Focus: leisure skills and social skills  Treatment Modality: Other: Recreation Therapy  Interventions utilized were leisure development  Purpose: other: enhance mood, increase stimulation, increase socialization, increase attention, increase physical movement,    Name: Donald Mcknight YOB: 1950   MR: [id number]      Facilitator: Recreational Therapist  Level of Participation: did not attend  Progress: None  Comments: Patients participated in therapeutic group game of “Pop darts”. This activity works on motor skills, increased physical movement and balance. Also aims to promote healthy competition, increased socialization and healthy entertainment. Pt in room for contact precautions.   Plan: continue with services

## 2024-03-05 ENCOUNTER — APPOINTMENT (OUTPATIENT)
Dept: CARDIOLOGY | Facility: HOSPITAL | Age: 74
End: 2024-03-05
Payer: OTHER GOVERNMENT

## 2024-03-05 LAB
AORTIC VALVE MEAN GRADIENT: 3 MMHG
AORTIC VALVE PEAK VELOCITY: 1.28 M/S
AV PEAK GRADIENT: 6.6 MMHG
AVA (PEAK VEL): 3.7 CM2
AVA (VTI): 4.28 CM2
EJECTION FRACTION APICAL 4 CHAMBER: 52.3
EJECTION FRACTION: 52 %
LEFT VENTRICLE INTERNAL DIMENSION DIASTOLE: 4.1 CM (ref 3.5–6)
LEFT VENTRICULAR OUTFLOW TRACT DIAMETER: 2.3 CM
MITRAL VALVE E/A RATIO: 0.83
MITRAL VALVE E/E' RATIO: 13.28
RIGHT VENTRICLE FREE WALL PEAK S': 6.31 CM/S
RIGHT VENTRICLE PEAK SYSTOLIC PRESSURE: 5.2 MMHG
TRICUSPID ANNULAR PLANE SYSTOLIC EXCURSION: 1.5 CM

## 2024-03-05 PROCEDURE — 99232 SBSQ HOSP IP/OBS MODERATE 35: CPT | Performed by: PSYCHIATRY & NEUROLOGY

## 2024-03-05 PROCEDURE — 87493 C DIFF AMPLIFIED PROBE: CPT | Mod: GEALAB | Performed by: NURSE PRACTITIONER

## 2024-03-05 PROCEDURE — 97165 OT EVAL LOW COMPLEX 30 MIN: CPT | Mod: GO

## 2024-03-05 PROCEDURE — 1240000001 HC SEMI-PRIVATE BH ROOM DAILY

## 2024-03-05 PROCEDURE — 2500000001 HC RX 250 WO HCPCS SELF ADMINISTERED DRUGS (ALT 637 FOR MEDICARE OP): Performed by: PSYCHIATRY & NEUROLOGY

## 2024-03-05 PROCEDURE — S4991 NICOTINE PATCH NONLEGEND: HCPCS | Performed by: PSYCHIATRY & NEUROLOGY

## 2024-03-05 PROCEDURE — 93306 TTE W/DOPPLER COMPLETE: CPT

## 2024-03-05 PROCEDURE — 94660 CPAP INITIATION&MGMT: CPT

## 2024-03-05 PROCEDURE — 2500000002 HC RX 250 W HCPCS SELF ADMINISTERED DRUGS (ALT 637 FOR MEDICARE OP, ALT 636 FOR OP/ED): Performed by: PSYCHIATRY & NEUROLOGY

## 2024-03-05 PROCEDURE — 2500000001 HC RX 250 WO HCPCS SELF ADMINISTERED DRUGS (ALT 637 FOR MEDICARE OP): Performed by: NURSE PRACTITIONER

## 2024-03-05 PROCEDURE — 93306 TTE W/DOPPLER COMPLETE: CPT | Performed by: INTERNAL MEDICINE

## 2024-03-05 PROCEDURE — 97161 PT EVAL LOW COMPLEX 20 MIN: CPT | Mod: GP

## 2024-03-05 PROCEDURE — 97530 THERAPEUTIC ACTIVITIES: CPT | Mod: GO

## 2024-03-05 RX ORDER — LISINOPRIL 5 MG/1
10 TABLET ORAL DAILY
Status: DISCONTINUED | OUTPATIENT
Start: 2024-03-06 | End: 2024-03-19

## 2024-03-05 RX ORDER — CARVEDILOL 12.5 MG/1
12.5 TABLET ORAL 2 TIMES DAILY
Status: DISCONTINUED | OUTPATIENT
Start: 2024-03-05 | End: 2024-03-20 | Stop reason: HOSPADM

## 2024-03-05 RX ADMIN — CARVEDILOL 12.5 MG: 12.5 TABLET, FILM COATED ORAL at 20:07

## 2024-03-05 RX ADMIN — FAMOTIDINE 20 MG: 20 TABLET ORAL at 08:20

## 2024-03-05 RX ADMIN — NITROGLYCERIN 0.5 INCH: 20 OINTMENT TOPICAL at 20:06

## 2024-03-05 RX ADMIN — CIPROFLOXACIN 500 MG: 500 TABLET ORAL at 08:21

## 2024-03-05 RX ADMIN — DIVALPROEX SODIUM 250 MG: 250 TABLET, DELAYED RELEASE ORAL at 17:32

## 2024-03-05 RX ADMIN — ACETAMINOPHEN 650 MG: 325 TABLET ORAL at 20:07

## 2024-03-05 RX ADMIN — LISINOPRIL 5 MG: 5 TABLET ORAL at 08:21

## 2024-03-05 RX ADMIN — ASPIRIN 81 MG: 81 TABLET, COATED ORAL at 08:20

## 2024-03-05 RX ADMIN — ATORVASTATIN CALCIUM 80 MG: 80 TABLET, FILM COATED ORAL at 20:07

## 2024-03-05 RX ADMIN — NYSTATIN: 100000 OINTMENT TOPICAL at 08:22

## 2024-03-05 RX ADMIN — PANTOPRAZOLE SODIUM 40 MG: 40 TABLET, DELAYED RELEASE ORAL at 06:09

## 2024-03-05 RX ADMIN — NYSTATIN: 100000 OINTMENT TOPICAL at 20:06

## 2024-03-05 RX ADMIN — ACETAMINOPHEN 650 MG: 325 TABLET ORAL at 15:14

## 2024-03-05 RX ADMIN — FLUOXETINE HYDROCHLORIDE 20 MG: 20 CAPSULE ORAL at 08:20

## 2024-03-05 RX ADMIN — NICOTINE 1 PATCH: 21 PATCH, EXTENDED RELEASE TRANSDERMAL at 08:21

## 2024-03-05 RX ADMIN — CARVEDILOL 6.25 MG: 3.12 TABLET, FILM COATED ORAL at 08:20

## 2024-03-05 RX ADMIN — NITROGLYCERIN 0.5 INCH: 20 OINTMENT TOPICAL at 15:12

## 2024-03-05 RX ADMIN — CALCIUM CARBONATE (ANTACID) CHEW TAB 500 MG 1000 MG: 500 CHEW TAB at 08:21

## 2024-03-05 RX ADMIN — DIVALPROEX SODIUM 250 MG: 250 TABLET, DELAYED RELEASE ORAL at 20:07

## 2024-03-05 RX ADMIN — METRONIDAZOLE 500 MG: 500 TABLET ORAL at 08:21

## 2024-03-05 SDOH — SOCIAL STABILITY: SOCIAL INSECURITY: ARE YOU OR HAVE YOU BEEN THREATENED OR ABUSED PHYSICALLY, EMOTIONALLY, OR SEXUALLY BY ANYONE?: YES

## 2024-03-05 SDOH — SOCIAL STABILITY: SOCIAL INSECURITY: WERE YOU ABLE TO COMPLETE ALL THE BEHAVIORAL HEALTH SCREENINGS?: YES

## 2024-03-05 SDOH — HEALTH STABILITY: MENTAL HEALTH: EXPERIENCED ANY OF THE FOLLOWING LIFE EVENTS: COMBAT OR EXPOSURE TO A WAR-ZONE

## 2024-03-05 SDOH — SOCIAL STABILITY: SOCIAL INSECURITY: DO YOU FEEL UNSAFE GOING BACK TO THE PLACE WHERE YOU ARE LIVING?: NO

## 2024-03-05 SDOH — SOCIAL STABILITY: SOCIAL INSECURITY: ABUSE: ADULT

## 2024-03-05 ASSESSMENT — PAIN - FUNCTIONAL ASSESSMENT
PAIN_FUNCTIONAL_ASSESSMENT: 0-10

## 2024-03-05 ASSESSMENT — COGNITIVE AND FUNCTIONAL STATUS - GENERAL
WALKING IN HOSPITAL ROOM: TOTAL
CLIMB 3 TO 5 STEPS WITH RAILING: TOTAL
HELP NEEDED FOR BATHING: A LOT
MOBILITY SCORE: 11
DAILY ACTIVITIY SCORE: 14
MOVING FROM LYING ON BACK TO SITTING ON SIDE OF FLAT BED WITH BEDRAILS: A LITTLE
DRESSING REGULAR UPPER BODY CLOTHING: A LITTLE
DRESSING REGULAR LOWER BODY CLOTHING: TOTAL
PERSONAL GROOMING: A LITTLE
STANDING UP FROM CHAIR USING ARMS: A LOT
MOVING TO AND FROM BED TO CHAIR: TOTAL
TOILETING: TOTAL
TURNING FROM BACK TO SIDE WHILE IN FLAT BAD: A LITTLE

## 2024-03-05 ASSESSMENT — LIFESTYLE VARIABLES
AUDIT-C TOTAL SCORE: 0
HOW MANY STANDARD DRINKS CONTAINING ALCOHOL DO YOU HAVE ON A TYPICAL DAY: PATIENT DOES NOT DRINK
AUDIT-C TOTAL SCORE: 0
SUBSTANCE_ABUSE_PAST_12_MONTHS: NO
HOW OFTEN DO YOU HAVE A DRINK CONTAINING ALCOHOL: NEVER
PRESCIPTION_ABUSE_PAST_12_MONTHS: NO
HOW OFTEN DO YOU HAVE 6 OR MORE DRINKS ON ONE OCCASION: NEVER
SKIP TO QUESTIONS 9-10: 1

## 2024-03-05 ASSESSMENT — COLUMBIA-SUICIDE SEVERITY RATING SCALE - C-SSRS
1. SINCE LAST CONTACT, HAVE YOU WISHED YOU WERE DEAD OR WISHED YOU COULD GO TO SLEEP AND NOT WAKE UP?: NO
2. HAVE YOU ACTUALLY HAD ANY THOUGHTS OF KILLING YOURSELF?: NO
6. HAVE YOU EVER DONE ANYTHING, STARTED TO DO ANYTHING, OR PREPARED TO DO ANYTHING TO END YOUR LIFE?: NO

## 2024-03-05 ASSESSMENT — PAIN DESCRIPTION - LOCATION: LOCATION: KNEE

## 2024-03-05 ASSESSMENT — PAIN SCALES - GENERAL
PAINLEVEL_OUTOF10: 10 - WORST POSSIBLE PAIN
PAINLEVEL_OUTOF10: 10 - WORST POSSIBLE PAIN
PAINLEVEL_OUTOF10: 0 - NO PAIN
PAINLEVEL_OUTOF10: 0 - NO PAIN
PAINLEVEL_OUTOF10: 3

## 2024-03-05 ASSESSMENT — ACTIVITIES OF DAILY LIVING (ADL)
ADLS_ADDRESSED: YES
BATHING_ASSISTANCE: MAXIMAL

## 2024-03-05 NOTE — CARE PLAN
"The patient's goals for the shift include \"get out of bed and eat something\"    The clinical goals for the shift include treatment compliant    Over the shift, the patient did not make progress toward the following goals. Barriers to progression include acuteness of illness. Recommendations to address these barriers include positive reinforcement .    Problem: Sensory Perceptual Alteration as Evidenced by  Goal: Cooperates with admission process  Outcome: Progressing  Goal: Patient/Family participate in treatment and discharge plans  Outcome: Progressing  Goal: Patient/Family verbalizes awareness of resources  Outcome: Progressing  Goal: Participates in unit activities  Outcome: Progressing  Goal: Discusses signs/symptoms of illness/treatment options  Outcome: Progressing  Goal: Initiates reality-based interactions  Outcome: Progressing  Goal: Able to discuss content of hallucinations/delusions  Outcome: Progressing  Goal: Notifies staff when experiencing hallucinations/delusions  Outcome: Progressing  Goal: Verbalizes reduction in hallucinations/delusions  Outcome: Progressing  Goal: Will not act on psychotic perception  Outcome: Progressing  Goal: Understands least restrictive measures  Outcome: Progressing  Goal: Free from restraint events  Outcome: Progressing     Problem: Altered Thought Processes as Evidenced by  Goal: STG - Desires improvement in ability to think and concentrate  Outcome: Progressing  Goal: STG - Participates in Occupational Therapy and other cognitive assessments  Outcome: Progressing     Problem: Potential for Harm to Self or Others  Goal: Cooperates with admission process  Outcome: Progressing  Goal: Participates in unit activities  Outcome: Progressing  Goal: Patient/Family participate in treatment and discharge plans  Outcome: Progressing  Goal: Identifies deescalation techniques  Outcome: Progressing  Goal: Understands least restrictive measures  Outcome: Progressing  Goal: Identifies " stressors that lead to harmful behaviors  Outcome: Progressing  Goal: Notifies staff when experiencing harmful thoughts toward self/others  Outcome: Progressing  Goal: Denies harm toward self or others  Outcome: Progressing  Goal: Free from restraint events  Outcome: Progressing     Problem: Educational/Scholastic Disruption  Goal: Meets educational requirements during hospitalization  Outcome: Progressing  Goal: Attends class without disruptive behavior  Outcome: Progressing  Goal: Completes daily assignments  Outcome: Progressing     Problem: Ineffective Coping  Goal: Cooperates with admission process  Outcome: Progressing  Goal: Identifies ineffective coping skills  Outcome: Progressing  Goal: Identifies healthy coping skills  Outcome: Progressing  Goal: Demonstrates healthy coping skills  Outcome: Progressing  Goal: Participates in unit activities  Outcome: Progressing  Goal: Patient/Family participate in treatment and discharge plans  Outcome: Progressing  Goal: Patient/Family verbalizes awareness of resources  Outcome: Progressing  Goal: Understands least restrictive measures  Outcome: Progressing  Goal: Free from restraint events  Outcome: Progressing     Problem: Alteration in Sleep  Goal: STG - Reports nightly sleep, duration, and quality  Outcome: Progressing  Goal: STG - Identifies sleep hygiene aids  Outcome: Progressing  Goal: STG - Informs staff if unable to sleep  Outcome: Progressing  Goal: STG - Attends breathing and relaxation group  Outcome: Progressing     Problem: Potential for Substance Withdrawal  Goal: Verbalizes signs/symptoms of withdrawal  Outcome: Progressing  Goal: Reports signs/symptoms of withdrawal  Outcome: Progressing  Goal: Free of withdrawal symptoms  Outcome: Progressing     Problem: Anxiety  Goal: Patient/family understands admission protocols  Outcome: Progressing  Goal: Attempts to manage anxiety with help  Outcome: Progressing  Goal: Verbalizes ways to manage  anxiety  Outcome: Progressing  Goal: Implements measures to reduce anxiety  Outcome: Progressing  Goal: Free from restraint events  Outcome: Progressing     Problem: Self Care Deficit  Goal: STG - Patient completes hygiene  Outcome: Progressing  Goal: Increase group attendance  Outcome: Progressing  Goal: Accepts need for medications  Outcome: Progressing  Goal: STG - Goes to and eats meals independently in dining room 100% of time  Outcome: Progressing     Problem: Defensive Coping  Goal: Cooperates with admission process  Outcome: Progressing  Goal: Identifies reckless/dangerous behavior  Outcome: Progressing  Goal: Identifies stressors that lead to reckless/dangerous behavior  Outcome: Progressing  Goal: Discusses and identifies healthy coping skills  Outcome: Progressing  Goal: Demonstrates healthy coping skills  Outcome: Progressing  Goal: Identifies appropriate social interaction  Outcome: Progressing  Goal: Demonstrates appropriate social interactions  Outcome: Progressing  Goal: Patient/Family verbalizes awareness of resources  Outcome: Progressing  Goal: Discusses signs/symptoms of illness/treatment options  Outcome: Progressing  Goal: Patient/Family participate in treatment and discharge plans  Outcome: Progressing  Goal: Understands least restrictive measures  Outcome: Progressing  Goal: Free from restraint events  Outcome: Progressing     Problem: Pain  Goal: My pain/discomfort is manageable  Outcome: Progressing

## 2024-03-05 NOTE — PROGRESS NOTES
Occupational Therapy    Evaluation    Patient Name: Donald Mcknight  MRN: [id number]  Today's Date: 3/5/2024  Time Calculation  Start Time: 0916  Stop Time: 0946  Time Calculation (min): 30 min        Assessment:  OT Assessment: Pt presents with impaired ADL performance and funcitonal mobility in addition to mental/ behavioral health concerns resulting in admission to inpatient [facility] hospital. Pt would benefit from continued skilled OT services during this LOS to address psychological needs as well as functional impairments. Recommend moderate intensity skilled therapy services upon discharge  End of Session Communication: Bedside nurse  End of Session Patient Position: Bed, 2 rail up, Alarm on  OT Assessment Results: Decreased ADL status, Decreased cognition, Decreased endurance, Decreased functional mobility, Decreased safe judgment during ADL  Medical Staff Made Aware: Yes  Plan:  Treatment Interventions: ADL retraining, Functional transfer training, Cognitive reorientation, UE strengthening/ROM, Compensatory technique education  OT Frequency: 3 times per week  OT Discharge Recommendations: Moderate intensity level of continued care  OT - OK to Discharge: Yes  Treatment Interventions: ADL retraining, Functional transfer training, Cognitive reorientation, UE strengthening/ROM, Compensatory technique education    Pt agreeable to OT POC for attendance of 5x/week group sessions and/ or 1:1 sessions to address the goals established above prior to discharge.       Subjective     General:  General  Reason for Referral: 74 yo male admitted for depression and SI. Referred to OT for impaired ADL and safety assessment  Past Medical History Relevant to Rehab: COPD and on 2-3 L, HTN, ARTEMIO, HPLD, depression, PTSD, GERD & obesity, OA of L knee,  Co-Treatment: PT  Co-Treatment Reason: OT maximize pt outcomes during mobility and physical assessment portion of evaluation  Prior to Session Communication: Bedside nurse  Patient  Position Received: Bed, 3 rail up  General Comment: Pleasant and cooperative. Questionable historian, PLOF and recent history provided by patient and confirmed with  info provided from family    Precautions:  Medical Precautions: Fall precautions  Precautions Comment: Contact precautions- r/o C.Diff. Suicide precautions, Risk of violence. Pt currently wearing 3L NC (baseline)    Pain:  Pain Assessment  Pain Assessment: 0-10  Pain Score: 10 - Worst possible pain  Pain Type: Acute pain  Pain Location: Abdomen  Pain Orientation: Left (lateral, near rib area)  Pain Interventions: Repositioned    Objective   Cognition:  Overall Cognitive Status: Impaired  Orientation Level: Disoriented to situation  Following Commands: Follows multistep commands with repetition  Safety Judgment: Decreased awareness of need for safety precautions  Cognition Comments: Pt reporting 10/10 anxiety, no improvement reported since admission. Denies current depression, SI- although does admit to increase in anxiety might be associated with grieving the loss of some family members.  Attention: Exceptions to WFL  Other (Comment): Tangential with conversation and off topic in response to some questions, providing variable answers to similar questions. Cues and repetition require to maintain attention to task and instructions for safety  Insight: Moderate  Impulsive: Moderately           Home Living:  Type of Home: House  Lives With: Spouse (Inconsisent response regarding who else is in the home. Initially states his sister lives with them, later states she passed away.)  Home Adaptive Equipment: Walker rolling or standard, Cane, Wheelchair-manual (stair lift to 2nd floor)  Home Layout: Multi-level, Bed/bath upstairs  Bathroom Shower/Tub: Tub/shower unit  Bathroom Equipment: None  Prior Function:  Prior Function Comments: Pt initially states he is independently using a cane in his home and sometimes a FWW. Completing BADL independently. Later stating  "that over the last few weeks he has been WC dependent, needing his spouse's assistance to transfer to/from  and dependent for all BADL. Pt also admits to a fall during this time  IADL History:  IADL Comments: Pt describes a lack of productive daily routine. States \"My wife watches lifetime and I paint all day, we don't really get out much\" Identifies impaired coping strategies and difficulty IDing stress management techniques.  ADL:  Eating Assistance: Independent  Grooming Assistance: Minimal  Bathing Assistance: Maximal  UE Dressing Assistance: Minimal  LE Dressing Assistance: Maximal  LE Dressing Deficit: Don/doff R sock, Don/doff L sock  Toileting Assistance with Device: Total  Toileting Deficit: Clothing management up, Clothing management down, Perineal hygiene    Bed Mobility/Transfers: Bed Mobility  Bed Mobility: Yes  Bed Mobility 1  Bed Mobility 1: Supine to sitting, Sitting to supine  Level of Assistance 1: Close supervision (SBA)  Bed Mobility Comments 1: once rolled to R side for toileting hygiene, pt able to continue moving to EOB sitting with supervision    Transfers  Transfer: Yes  Transfer 1  Technique 1: Sit to stand, Stand to sit  Transfer Device 1: Walker  Transfer Level of Assistance 1: Moderate assistance (x2 assist)  Trials/Comments 1: cues for hand placement and body mechanics  to complete transfer      Ambulation/Gait Training:  Ambulation/Gait Training  Ambulation/Gait Training Performed: Yes  Ambulation/Gait Training 1  Surface 1: Level tile  Device 1: Rolling walker  Assistance 1: Moderate assistance (x2 assist)  Comments/Distance (ft) 1: x2 lateral steps along side of bed. Assistance for FWW positioning and sequencing, pt impulsive and repeatedly states \"my knees are buckling!\" with no physical evidence of R or L LE buckling. Not receptive to cues and instructions to safely continue mobility, returned to sitting at EOB  Sitting Balance:  Dynamic Sitting Balance  Dynamic Sitting-Balance " Support: Feet supported  Dynamic Sitting-Balance:  (Attempted ADL)  Dynamic Sitting-Comments: intermittent L lateral lean due to abdominal/rib area pain. Returns midline with cues    Sensation:  Sensation Comment: pt reports chronic BLE numbnesss and diminished sensation in B feet.  Strength:  Strength Comments: B UE 3+/5  Extremities: RUE   RUE : Within Functional Limits and LUE   LUE: Within Functional Limits      Outcome Measures:[facility] Daily Activity  Putting on and taking off regular lower body clothing: Total  Bathing (including washing, rinsing, drying): A lot  Putting on and taking off regular upper body clothing: A little  Toileting, which includes using toilet, bedpan or urinal: Total  Taking care of personal grooming such as brushing teeth: A little  Eating Meals: None  Daily Activity - Total Score: 14        Education Documentation  Body Mechanics, taught by Jessica Ayala, OT at 3/5/2024 12:35 PM.  Learner: Patient  Readiness: Acceptance  Method: Explanation  Response: Needs Reinforcement    Goals:  Encounter Problems            OT Goals       Pt will demo ADL routine and meaningful daily activities with Marina using modifications as needed  (Progressing)       Start:  03/05/24    Expected End:  04/02/24            Patient will attend to therapeutic task for 10 minutes with minimum verbal cues demonstrating improved attention and participation in daily activities.  (Progressing)       Start:  03/05/24    Expected End:  04/02/24            Pt will demo functional transfers to/ from EOB, chair and commode with Marina and LRD (Progressing)       Start:  03/05/24    Expected End:  04/02/24            Pt will ID/ utilize 1-2 ways to increase balance of activity/ re-involve self in functional daily routines/roles prior to discharge.   (Progressing)       Start:  03/05/24    Expected End:  04/02/24            Pt will explore and ID 1-2 strategies to manage stressors/symptoms of illness/ grief more effectively  prior to discharge.   (Progressing)       Start:  03/05/24    Expected End:  04/02/24

## 2024-03-05 NOTE — PROGRESS NOTES
Physical Therapy    Physical Therapy Evaluation    Patient Name: Donald Mcknight  MRN: [id number]  Today's Date: 3/5/2024   Time Calculation  Start Time: 0914  Stop Time: 0940  Time Calculation (min): 26 min    Assessment/Plan   PT Assessment  PT Assessment Results: Decreased strength, Decreased endurance, Impaired balance, Decreased mobility, Impaired judgement, Decreased safety awareness, Decreased cognition, Impaired sensation, Pain  Rehab Prognosis: Fair  Barriers to Discharge: cognition  Evaluation/Treatment Tolerance: Patient tolerated treatment well, Patient limited by fatigue, Patient limited by pain  Medical Staff Made Aware: Yes  Barriers to Participation: Ability to acquire knowledge  End of Session Communication: Bedside nurse  Assessment Comment: pt presents with impaired cognition, mobility, balance, inability to ambualate and decreased safety awareness; pt questionable historian; pt would benefit from continued skilled therapy to address impairements; pt supine in bed at end of PT session  End of Session Patient Position: Bed, 2 rail up, Alarm off, caregiver present (OT EOB at end of session)  IP OR SWING BED PT PLAN  Inpatient or Swing Bed: Inpatient  PT Plan  Treatment/Interventions: Bed mobility, Transfer training, Gait training, Balance training, Strengthening, Endurance training, Range of motion, Therapeutic exercise, Therapeutic activity  PT Plan: Skilled PT  PT Frequency: 3 times per week  PT Discharge Recommendations: Moderate intensity level of continued care  Equipment Recommended upon Discharge: Wheeled walker (FWWq)  PT Recommended Transfer Status: Assist x2  PT - OK to Discharge: Yes (Per PT POC)      Subjective   General Visit Information:  General  Reason for Referral: 73 YOM referred to PT for impaired mobility  Referred By: Teresa Myers, APRN-CNP  Past Medical History Relevant to Rehab: COPD and on 2-3 L, HTN, ARTEMIO, HPLD, depression, PTSD, GERD & obesity, OA of L knee,  Co-Treatment:  OT  Co-Treatment Reason: to ensure pt safety and facilitate patient outcome  Prior to Session Communication: Bedside nurse  Patient Position Received: Bed, 3 rail up  General Comment: pt supine in bed attached to 3L O2; RN cleared pt for therapy; pt reports 9/10 anxiety and 10/10 L abd pain; pt willing to work with PT; pt questionable historian  Home Living:  Home Living  Type of Home: House  Lives With: Spouse (pt originally noted that he lives with his sister, but then stated that sister had passed away)  Home Adaptive Equipment: Walker rolling or standard, Cane (chair lift, W/C)  Home Layout: Multi-level, Bed/bath upstairs  Bathroom Shower/Tub: Tub/shower unit  Bathroom Equipment: None  Home Living Comments: pt reports that he lives in a multi story home with a chair lift to get to bed/ bath on second floor  Prior Level of Function:  Prior Function Per Pt/Caregiver Report  Level of [place]: Other (Comment) (Pt first reported that he was using a cane to get around in the house and out in the community, but does have walker if needed; then stated that he does not ambulate and usually uses a WC; questionable historian)  Prior Function Comments: pt questionable historian; per EMR, family reports recent functional decline since covid 2-3 weeks prior  Precautions:  Precautions  Medical Precautions: Fall precautions, Infection precautions, Oxygen therapy device and L/min (C-Diff Rule Out; 3L O2)    Objective   Pain:  Pain Assessment  Pain Assessment: 0-10  Pain Score: 10 - Worst possible pain  Pain Type: Acute pain  Pain Location: Abdomen  Pain Orientation: Left  Pain Interventions: Repositioned  Cognition:  Cognition  Overall Cognitive Status:  (pt at least oriented to self and location as assessed; pt did note confusion throughout conversation in regards to prior mobility levels and who he has been living with)    General Assessments:  General Observation  General Observation: pt presents with confusion noted  with conversation    Sensation  Sensation Comment:  (pt reports numbness in B LE pain that is chronic and no feeling in B feet)    Strength  Strength Comments: at least 3/5 as assessed with functional transfer  Postural Control  Postural Control: Impaired  Posture Comment: pt unable to maintain up right sitting posture for long duration; leaning off to L    Static Sitting Balance  Static Sitting-Balance Support: Bilateral upper extremity supported  Static Sitting-Level of Assistance: Close supervision  Static Sitting-Comment/Number of Minutes: SBA; pt 1xLOB and leaning on L elbow secondary to L abd pain  Dynamic Sitting Balance  Dynamic Sitting-Balance Support: Bilateral upper extremity supported  Dynamic Sitting-Balance:  (doning briefs)  Dynamic Sitting-Comments: SBA; pt able to maintain balance, but unable to lean over secondary to pain    Static Standing Balance  Static Standing-Balance Support: Bilateral upper extremity supported  Static Standing-Level of Assistance: Moderate assistance (x2)  Static Standing-Comment/Number of Minutes: ModAx2 to maintain standing with FWW; pt reports knee buckling  Functional Assessments:  ADL  ADL's Addressed: Yes  LE Dressing Assistance: Moderate  LE Dressing Deficit: Pull up over hips, Thread RLE into underwear, Thread LLE into underwear  ADL Comments: pt required modA to don briefs    Bed Mobility  Bed Mobility: Yes  Bed Mobility 1  Bed Mobility 1: Supine to sitting, Sitting to supine  Level of Assistance 1: Close supervision  Bed Mobility Comments 1: SBA; Pt reported needing assistance with getting EOB but was able to complete task by rolling to R    Transfers  Transfer: Yes  Transfer 1  Transfer From 1: Bed to, Stand to  Transfer to 1: Stand, Bed  Technique 1: Sit to stand, Stand to sit  Transfer Device 1: Walker (FWW)  Transfer Level of Assistance 1: Moderate assistance (x2)  Trials/Comments 1: ModAx2; FWW; pt cued on proper hand placement and body mechanics for STS and  posture control    Ambulation/Gait Training  Ambulation/Gait Training Performed: No (unable to assess d/t poor standing tolerance)    Outcome Measures:  [facility] Basic Mobility  Turning from your back to your side while in a flat bed without using bedrails: A little  Moving from lying on your back to sitting on the side of a flat bed without using bedrails: A little  Moving to and from bed to chair (including a wheelchair): Total  Standing up from a chair using your arms (e.g. wheelchair or bedside chair): A lot  To walk in hospital room: Total  Climbing 3-5 steps with railing: Total  Basic Mobility - Total Score: 11    Encounter Problems       Encounter Problems (Active)       Balance       Pt will demo dynamic sitting balance x5 minutes with no UE support and S/I to improve stability and decrease falls        Start:  03/05/24    Expected End:  03/19/24            Pt will demo static/dynamic standing balance x5 minutes with B UE support and S/I to improve stability and decrease falls        Start:  03/05/24    Expected End:  03/19/24               Mobility       X15 reps ther ex to increase general strength and improve functional independence         Start:  03/05/24    Expected End:  03/19/24            X150 feet with/without use of DME and S/I assist  necessary to initiate return to PLOF        Start:  03/05/24    Expected End:  03/19/24            pt will tolerate 30 minutes with 1 rest breaks and maintaining O2 sats >88% on baseline 3L O2 necessary for improved functional endurance         Start:  03/05/24    Expected End:  03/19/24               Transfers       Pt will complete all functional transfers with S/I necessary to initiate return to PLOF         Start:  03/05/24    Expected End:  03/19/24                   Education Documentation  Body Mechanics, taught by KHANG CleaningPT at 3/5/2024 10:06 AM.  Learner: Patient  Readiness: Acceptance  Method: Explanation  Response: Needs Reinforcement    Mobility  Training, taught by JANEE Cleaning at 3/5/2024 10:06 AM.  Learner: Patient  Readiness: Acceptance  Method: Explanation  Response: Needs Reinforcement    Education Comments  No comments found.

## 2024-03-05 NOTE — SIGNIFICANT EVENT
"   03/05/24 1455   Discharge Planning   Living Arrangements Spouse/significant other;Children   Support Systems Spouse/significant other;Children   Assistance Needed needs referral to outpatient community support services; needs MOCA/cognitive exam to assess mental acuity and safety.   Type of Residence Private residence   Patient expects to be discharged to: home with his wife and daughter providing 24/7 care.   Financial Resource Strain   How hard is it for you to pay for the very basics like food, housing, medical care, and heating? Not hard   Patient Choice   Patient / Family choosing to utilize agency / facility established prior to hospitalization No     This is a 73 year old  white male who was admitted for concerns of hallucinations and delusions;  during this assessment he stated he was not hallucinating or deluded; he said he was upset \"they keep changing the radio channel on me and I was listening to the game.\";  He lives with his wife and daughter in [place], [place]; stated they take care of him and he wants to go back there at discharge;  he had reported to his family that he wanted to shoot himself, but now is denying he wants to hurt himself today; he did admit that he has so much pain in his shoulder that he wants to kill himself to get rid of the pain.  He is calm and cooperative; college graduate with BA in Education, is a former ;  is a retired [employer] , who who experienced combat and admits he has flashbacks and nightmares from this.  He denies he has ever had outpatient or inpatient mental health services in the past;  He denies recent legal issues (stated he was arrest 30 years ago for driving the wrong way on a one way street, was arrested but only jailed for a couple of hours);  denies drug or alcohol use;  He signed the Application for Voluntary Admission and an BRIDGET for his wife.  Sw to follow.   "

## 2024-03-05 NOTE — PROGRESS NOTES
Occupational Therapy     REHAB Therapy Assessment & Treatment    Patient Name: Donald Mcknight  MRN: [id number]  Today's Date: 3/5/2024      Activity Assessment:   Movement/Meaningful Sharing and Personal Supports Group: [id number]  Cognitive Task/ Team Collaboration Group: [id number]    0/2 Groups attended     Following OT eval and in collaboration with the OTR/L, pt unable to attend either above group d/t confusion and impaired understanding of group programming. No HANKINS groups this date . Pt would benefit from continued OT services in order to improve overall self-esteem, personal confidence and supports with increased awareness for safe transition location at discharge.      Encounter Problems                  OT Goals       Pt will demo ADL routine and meaningful daily activities with Marina using modifications as needed  (Progressing)       Start:  03/05/24    Expected End:  04/02/24            Patient will attend to therapeutic task for 10 minutes with minimum verbal cues demonstrating improved attention and participation in daily activities.  (Progressing)       Start:  03/05/24    Expected End:  04/02/24            Pt will demo functional transfers to/ from EOB, chair and commode with Marina and LRD (Progressing)       Start:  03/05/24    Expected End:  04/02/24            Pt will ID/ utilize 1-2 ways to increase balance of activity/ re-involve self in functional daily routines/roles prior to discharge.   (Progressing)       Start:  03/05/24    Expected End:  04/02/24            Pt will explore and ID 1-2 strategies to manage stressors/symptoms of illness/ grief more effectively prior to discharge.   (Progressing)       Start:  03/05/24    Expected End:  04/02/24               Transfers                    Additional Comments:  HANKINS collaborated with patients nurse and charge nurse throughout the day to provide the appropriate support and encouragement to attend groups. Pt up on unit when HANKINS left last group of  the day. All needs met.

## 2024-03-05 NOTE — GROUP NOTE
Group Topic: Personal Responsibility   Group Date: 3/5/2024  Start Time: 1400  End Time: 1500  Facilitators: TAN Dos Santos   Department: [facility] REHAB THERAPY VIRTUAL    Number of Participants: 8   Group Focus: goals, other personal values, personal responsibility, and self-awareness  Treatment Modality: Other: Recreational Therapy   Interventions utilized were exploration, patient education, and support  Purpose: insight or knowledge, self-care, and other: identifying personal values/responsibilities      Name: Donald Mcknight YOB: 1950   MR: [id number]      Facilitator: Recreational Therapist  Level of Participation: did not attend  Progress: None  Comments: Patient declined invitation to group activity at this time. Patient will continue to be provided with opportunities to enhance leisure skills and/or coping mechanisms.  Plan: continue with services
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
## 2024-03-05 NOTE — NURSING NOTE
"03/04/2024 @2230- Pt was bright and cooperative. Pt is pleasantly confused at times. Pt rated anxiety 9/10, depression 8/10 due to \"missing my family,\" pt denied SI/HI, AVH. Pt rated pain 3/10 in his abdomen PRN tylenol given. Pt stated his goal is \"to walk around,\" and his coping skill is \"TV.\" Pt stated his strengths are \"a big cat.\" Pt is currently sleeping in bed without any signs or symptoms of distress. No new orders to carry out at this time. 1:1 sitter maintained at bedside.    03/05/2024 @0530-  Pt had an uneventful night, slept well throughout. No PRNs needed. Pt is currently sleeping in bed without any signs or symptoms of distress. No new orders to carry out at this time. 1:1 sitter maintained at bedside  "

## 2024-03-05 NOTE — CARE PLAN
"The patient's goals for the shift include \"to walk around\"    The clinical goals for the shift include medication compliane    Problem: Potential for Harm to Self or Others  Goal: Participates in unit activities  Outcome: Progressing     Problem: Ineffective Coping  Goal: Identifies healthy coping skills  Outcome: Progressing  Goal: Demonstrates healthy coping skills  Outcome: Progressing     Over the shift, the patient did make progress toward the following goals.     "

## 2024-03-05 NOTE — PROGRESS NOTES
"Donald Mcknight is a 73 y.o. year old male patient who is on U admission day 2.      Subjective   Donald Mcknight is a 73 y.o. year old male patient who was personally seen and interviewed, and discussed in morning team rounds. The patient was interviewed alone in a room (interviewed laying in bed, awake and alert), and was easily engaged and cooperative. This morning, Donald reports feeling \"not worth a crap\" and currently rates his depression at a 4 out of 10. No current suicidal ideation or plans were elicited. He also rates his anxiety at a 7 out of 10. No hallucinations or paranoia were endorsed or noted.   Donald slept 8.5 hours last night (broken).          Objective   Mental Status Exam:   General: Appropriately groomed and dressed in hospital attire, laying in bed, awake and alert.   Appearance: Appears stated age.   Attitude: Calm, cooperative.   Behavior: Appropriate eye contact.   Motor Activity: No agitation or retardation. No EPS/TD. Impaired gait and station. Normal muscle tone and bulk.   Speech: Regular rate, rhythm, volume and tone, spontaneous, fluent. Non-pressured. Occasionally poor articulation.   Mood: \"Not worth a crap\"   Affect: Mostly pleasant.   Thought Process: Organized, and goal directed.   Thought Content: Does not currently endorse suicidal ideation or any suicide plans.   Does not endorse homicidal ideation.  No overt delusions or paranoia elicited.    Thought Perception: Does not endorse auditory or visual hallucinations, does not appear to be responding to hallucinatory stimuli.   Cognition: Alert, oriented x 3 currently. Adequate fund of knowledge. Possibly some deficit in recent and remote memory. No overt deficits in attention, concentration or language.   Insight: Poor-to-fair, as patient recognizes symptoms of  illness and need for recommended treatments.    Judgment: Poor, as patient can make reasonable decisions about ordinary activities of daily living and necessary " medical care recommendations.       LABS:  No results found for this or any previous visit (from the past 24 hour(s)).     Last Recorded Vitals  Visit Vitals  BP (!) 156/98 (BP Location: Left arm, Patient Position: Lying)   Pulse 101   Temp 36 °C (96.8 °F) (Temporal)   Resp 18        Intake/Output last 3 Shifts:  No intake/output data recorded.    Relevant Results  Scheduled medications  aspirin, 81 mg, oral, Daily  atorvastatin, 80 mg, oral, Nightly  calcium carbonate, 1,000 mg, oral, Daily  carvedilol, 12.5 mg, oral, BID  divalproex, 250 mg, oral, BID  famotidine, 20 mg, oral, Daily  FLUoxetine, 20 mg, oral, Daily  lidocaine, 1 patch, transdermal, Daily  [START ON 3/6/2024] lisinopril, 10 mg, oral, Daily  nicotine, 1 patch, transdermal, Daily  nitroglycerin, 0.5 inch, transdermal, q6h during day  nystatin, , Topical, BID  pantoprazole, 40 mg, oral, Daily before breakfast  perflutren lipid microspheres, 0.5-10 mL of dilution, intravenous, Once in imaging      Continuous medications     PRN medications  PRN medications: acetaminophen, albuterol, alum-mag hydroxide-simeth, hydrOXYzine pamoate, melatonin, OLANZapine **OR** OLANZapine, oxygen, psyllium               Assessment/Plan   1) Other Specified Depressive Disorder       Plan: 1) Obtain more collateral information     Discussed potential risks, benefits, and alternatives to medications with patient, who consented to the above medications.     2) Delirium, acute - resolved       Plan: 1) trial Depakote 250 mg Q17:00 and Q21:00     3) HTN       Plan: 1) IM service to follow and manage.     4) COPD       Plan: 1) IM service to follow and manage.     5) ARTEMIO       Plan: 1) IM service to follow and manage.     6) Diverticulitis       Plan: 1) IM service to follow and manage.     7) GERD       Plan: 1) IM service to follow and manage.          Palomo Tiwari MD

## 2024-03-05 NOTE — NURSING NOTE
Pt in bed most os the day ,he did get up with help and took a shower and ate lunch in the lounge, his wife came and saw him , pt decided he was tired so his wife was going to leave at that time they came to get pt to go down to get some test done. Anxiety 6/10 depression 8/10. Pt denied SI/HI and A/V/H. Safety maintained. Med compliant. Pt contracted for safety with this staff at this time.

## 2024-03-06 LAB
ANION GAP SERPL CALC-SCNC: 13 MMOL/L (ref 10–20)
BUN SERPL-MCNC: 19 MG/DL (ref 6–23)
C DIF TOX TCDA+TCDB STL QL NAA+PROBE: NOT DETECTED
CALCIUM SERPL-MCNC: 8.3 MG/DL (ref 8.6–10.3)
CHLORIDE SERPL-SCNC: 105 MMOL/L (ref 98–107)
CO2 SERPL-SCNC: 28 MMOL/L (ref 21–32)
CREAT SERPL-MCNC: 1.14 MG/DL (ref 0.5–1.3)
EGFRCR SERPLBLD CKD-EPI 2021: 68 ML/MIN/1.73M*2
GLUCOSE SERPL-MCNC: 97 MG/DL (ref 74–99)
POTASSIUM SERPL-SCNC: 3.6 MMOL/L (ref 3.5–5.3)
SODIUM SERPL-SCNC: 142 MMOL/L (ref 136–145)

## 2024-03-06 PROCEDURE — 94660 CPAP INITIATION&MGMT: CPT

## 2024-03-06 PROCEDURE — 2500000001 HC RX 250 WO HCPCS SELF ADMINISTERED DRUGS (ALT 637 FOR MEDICARE OP): Performed by: NURSE PRACTITIONER

## 2024-03-06 PROCEDURE — 2500000001 HC RX 250 WO HCPCS SELF ADMINISTERED DRUGS (ALT 637 FOR MEDICARE OP): Performed by: PSYCHIATRY & NEUROLOGY

## 2024-03-06 PROCEDURE — 80048 BASIC METABOLIC PNL TOTAL CA: CPT | Performed by: NURSE PRACTITIONER

## 2024-03-06 PROCEDURE — 1240000001 HC SEMI-PRIVATE BH ROOM DAILY

## 2024-03-06 PROCEDURE — 2500000005 HC RX 250 GENERAL PHARMACY W/O HCPCS: Performed by: NURSE PRACTITIONER

## 2024-03-06 PROCEDURE — 2500000004 HC RX 250 GENERAL PHARMACY W/ HCPCS (ALT 636 FOR OP/ED): Performed by: NURSE PRACTITIONER

## 2024-03-06 PROCEDURE — 99232 SBSQ HOSP IP/OBS MODERATE 35: CPT | Performed by: PSYCHIATRY & NEUROLOGY

## 2024-03-06 PROCEDURE — 36415 COLL VENOUS BLD VENIPUNCTURE: CPT | Performed by: NURSE PRACTITIONER

## 2024-03-06 PROCEDURE — 99232 SBSQ HOSP IP/OBS MODERATE 35: CPT | Performed by: NURSE PRACTITIONER

## 2024-03-06 RX ORDER — METHYLPREDNISOLONE 4 MG/1
16 TABLET ORAL ONCE
Status: COMPLETED | OUTPATIENT
Start: 2024-03-08 | End: 2024-03-08

## 2024-03-06 RX ORDER — METHYLPREDNISOLONE 4 MG/1
4 TABLET ORAL ONCE
Status: COMPLETED | OUTPATIENT
Start: 2024-03-11 | End: 2024-03-11

## 2024-03-06 RX ORDER — DIVALPROEX SODIUM 125 MG/1
125 TABLET, DELAYED RELEASE ORAL 2 TIMES DAILY
Status: DISCONTINUED | OUTPATIENT
Start: 2024-03-06 | End: 2024-03-15

## 2024-03-06 RX ORDER — METHYLPREDNISOLONE 4 MG/1
12 TABLET ORAL ONCE
Status: COMPLETED | OUTPATIENT
Start: 2024-03-09 | End: 2024-03-09

## 2024-03-06 RX ORDER — METHYLPREDNISOLONE 4 MG/1
8 TABLET ORAL ONCE
Status: COMPLETED | OUTPATIENT
Start: 2024-03-10 | End: 2024-03-10

## 2024-03-06 RX ORDER — METHYLPREDNISOLONE 4 MG/1
24 TABLET ORAL ONCE
Status: COMPLETED | OUTPATIENT
Start: 2024-03-06 | End: 2024-03-06

## 2024-03-06 RX ORDER — METHYLPREDNISOLONE 4 MG/1
20 TABLET ORAL ONCE
Status: COMPLETED | OUTPATIENT
Start: 2024-03-07 | End: 2024-03-07

## 2024-03-06 RX ADMIN — CALCIUM CARBONATE (ANTACID) CHEW TAB 500 MG 1000 MG: 500 CHEW TAB at 08:05

## 2024-03-06 RX ADMIN — CARVEDILOL 12.5 MG: 12.5 TABLET, FILM COATED ORAL at 08:05

## 2024-03-06 RX ADMIN — Medication 5 MG: at 20:28

## 2024-03-06 RX ADMIN — PANTOPRAZOLE SODIUM 40 MG: 40 TABLET, DELAYED RELEASE ORAL at 05:44

## 2024-03-06 RX ADMIN — NYSTATIN: 100000 OINTMENT TOPICAL at 08:07

## 2024-03-06 RX ADMIN — ALUMINUM HYDROXIDE, MAGNESIUM HYDROXIDE, AND DIMETHICONE 30 ML: 200; 20; 200 SUSPENSION ORAL at 20:28

## 2024-03-06 RX ADMIN — ALUMINUM HYDROXIDE, MAGNESIUM HYDROXIDE, AND DIMETHICONE 30 ML: 200; 20; 200 SUSPENSION ORAL at 02:05

## 2024-03-06 RX ADMIN — CARVEDILOL 12.5 MG: 12.5 TABLET, FILM COATED ORAL at 20:28

## 2024-03-06 RX ADMIN — ACETAMINOPHEN 650 MG: 325 TABLET ORAL at 00:17

## 2024-03-06 RX ADMIN — ATORVASTATIN CALCIUM 80 MG: 80 TABLET, FILM COATED ORAL at 20:28

## 2024-03-06 RX ADMIN — FLUOXETINE HYDROCHLORIDE 20 MG: 20 CAPSULE ORAL at 08:06

## 2024-03-06 RX ADMIN — LIDOCAINE 1 PATCH: 4 PATCH TOPICAL at 08:06

## 2024-03-06 RX ADMIN — LISINOPRIL 10 MG: 5 TABLET ORAL at 08:05

## 2024-03-06 RX ADMIN — METHYLPREDNISOLONE 24 MG: 4 TABLET ORAL at 15:32

## 2024-03-06 RX ADMIN — DIVALPROEX SODIUM 125 MG: 125 TABLET, DELAYED RELEASE ORAL at 17:38

## 2024-03-06 RX ADMIN — DIVALPROEX SODIUM 125 MG: 125 TABLET, DELAYED RELEASE ORAL at 20:28

## 2024-03-06 RX ADMIN — FAMOTIDINE 20 MG: 20 TABLET ORAL at 08:05

## 2024-03-06 RX ADMIN — ACETAMINOPHEN 650 MG: 325 TABLET ORAL at 05:44

## 2024-03-06 RX ADMIN — ASPIRIN 81 MG: 81 TABLET, COATED ORAL at 08:06

## 2024-03-06 ASSESSMENT — PAIN - FUNCTIONAL ASSESSMENT
PAIN_FUNCTIONAL_ASSESSMENT: 0-10

## 2024-03-06 ASSESSMENT — PAIN SCALES - GENERAL
PAINLEVEL_OUTOF10: 3
PAINLEVEL_OUTOF10: 5 - MODERATE PAIN
PAINLEVEL_OUTOF10: 3
PAINLEVEL_OUTOF10: 10 - WORST POSSIBLE PAIN

## 2024-03-06 ASSESSMENT — PAIN DESCRIPTION - LOCATION
LOCATION: LEG
LOCATION: RIB CAGE

## 2024-03-06 ASSESSMENT — PAIN DESCRIPTION - DESCRIPTORS: DESCRIPTORS: ACHING;DULL

## 2024-03-06 ASSESSMENT — PAIN DESCRIPTION - ORIENTATION: ORIENTATION: LEFT

## 2024-03-06 NOTE — PROGRESS NOTES
Patient: Donald Mcknight  Room/bed: 202/202-B  Admitted on: 3/3/2024    Age: 73 y.o.   Gender: male  Code Status:  Full Code   Admitting Dx: Depression with suicidal ideation [F32.A, R45.851]    MRN: [id number]  PCP: SHARON Cotton-TRACEY       Subjective   Asked by RN to evaluate as patient is again c/o chest pain. Seen and examined in his room this afternoon. Lying in bed wearing his CPAP/BiPAP. He states the pain is in the left side of his chest wall and radiates into his neck and shoulder. He denies jaw pain or tingling, sweating, breathing difficulties.     Objective    Physical Exam   Constitutional: A&O x 3; NAD; calm and cooperative  Eyes: EOM's intact  HEENT: Normocephalic, Atraumatic. Oral mucosa moist.   Neck: Supple. No JVD, lymphadenopathy.   Lungs: CTAB with fair air movement. Respirations even and unlabored on CPAP.   Heart: RRR; left sided chest pain to left of nipple line that is reproducible.   Abdomen: Soft, non-tender, non-distended, +BS  MS/Extremities: CASTILLO equally x 4. No edema. Peripheral pulses intact bilaterally.   Neuro: A&O x3; no focal deficits; gross motor and sensation intact.   Skin: Warm and dry. No rashes or lesions  Psych: Normal affect.      Temp:  [36.2 °C (97.2 °F)-36.8 °C (98.2 °F)] 36.4 °C (97.5 °F)  Heart Rate:  [71-94] 86  Resp:  [18] 18  BP: (138-156)/() 138/89    Vitals:    03/04/24 0700   Weight: 117 kg (257 lb 15 oz)             I/Os  No intake or output data in the 24 hours ending 03/06/24 1547    Labs:   Results from last 72 hours   Lab Units 03/06/24  0620 03/04/24  0712   SODIUM mmol/L 142  --    POTASSIUM mmol/L 3.6 3.8   CHLORIDE mmol/L 105  --    CO2 mmol/L 28  --    BUN mg/dL 19  --    CREATININE mg/dL 1.14  --    GLUCOSE mg/dL 97  --    CALCIUM mg/dL 8.3*  --    ANION GAP mmol/L 13  --    EGFR mL/min/1.73m*2 68  --            Lab Results   Component Value Date    CALCIUM 8.3 (L) 03/06/2024        Micro/ID:   No results found for the last 90  days.    Images:  Echocardiogram:  CONCLUSIONS:   1. Left ventricular systolic function is normal with a 60-65% estimated ejection fraction.   2. Poorly visualized anatomical structures due to suboptimal image quality.   3. Spectral Doppler shows an impaired relaxation pattern of left ventricular diastolic filling.     Meds    Scheduled medications  aspirin, 81 mg, oral, Daily  atorvastatin, 80 mg, oral, Nightly  calcium carbonate, 1,000 mg, oral, Daily  carvedilol, 12.5 mg, oral, BID  divalproex, 125 mg, oral, BID  famotidine, 20 mg, oral, Daily  FLUoxetine, 20 mg, oral, Daily  lidocaine, 1 patch, transdermal, Daily  lisinopril, 10 mg, oral, Daily  [START ON 3/7/2024] methylPREDNISolone, 20 mg, oral, Once   Followed by  [START ON 3/8/2024] methylPREDNISolone, 16 mg, oral, Once   Followed by  [START ON 3/9/2024] methylPREDNISolone, 12 mg, oral, Once   Followed by  [START ON 3/10/2024] methylPREDNISolone, 8 mg, oral, Once   Followed by  [START ON 3/11/2024] methylPREDNISolone, 4 mg, oral, Once  nystatin, , Topical, BID  pantoprazole, 40 mg, oral, Daily before breakfast  perflutren lipid microspheres, 0.5-10 mL of dilution, intravenous, Once in imaging      Continuous medications     PRN medications  PRN medications: acetaminophen, albuterol, alum-mag hydroxide-simeth, hydrOXYzine pamoate, melatonin, OLANZapine **OR** OLANZapine, oxygen, psyllium     Assessment and Plan    Donald Mcknight is a 73 y.o. male with a medical history of COPD, ARTEMIO, HTN, and Depression who was admitted to [facility] for suicidal ideations. Consulted for medical management.     Chest Pain - Intermittent  -Pain is reproducible on exam  -EKG without ST elevation or acute changes  -Echocardiogram reviewed as noted above  -Discussed with cardiology who did a full evaluation on 3/4 and reports that this pain is non-cardiac in nature  -On medical therapy: ASA, Statin, Carvedilol, Lisinopril  -Reviewed CT Angio of Chest and CXR and does note emphysema and  possible bronchitis  -Will give short course of steroids to treat possible musculoskeletal/costochondritis etiology    CV: HTN, HFpEF  -Echocardiogram: LVEF 60-65%  -On ASA, Carvedilol, Lisinopril  -BP moderately elevated - will monitor as Carvedilol was added 2 days ago    COPD  -No clinical signs of acute exacerbation at this time    Disposition  -Plan of care discussed with nursing staff, cardiology APRN.       Fauzia Villagomez, APRN-CNP

## 2024-03-06 NOTE — NURSING NOTE
"Pt is in bed refusing to get up for meals or any actively. Pt stated \" I am tired I just want to sleep\"  Anxiety 7/10 depression 5/10. Pt denied SI/HI and A/V/H. Safety maintained. Med compliant. Pt contracted for safety with this staff at this time.   "

## 2024-03-06 NOTE — PROGRESS NOTES
"Physical Therapy                 Therapy Communication Note    Patient Name: Donald Mcknight  MRN: [id number]  Today's Date: 3/6/2024     Discipline: Physical Therapy    Missed Visit Reason: Missed Visit Reason: Patient refused (in bed with CPAP partially on and refused stating \"no i hurt in my shoulders and my bakc, I dcont want to get up, \" became more adamant of a no with encouragement. No tx, nursing notified)    Missed Time: Attempt 1005    Comment:  "

## 2024-03-06 NOTE — SIGNIFICANT EVENT
03/06/24 1513   Discharge Planning   Living Arrangements Spouse/significant other;Children   Support Systems Spouse/significant other;Children   Assistance Needed Needs follow up outpatient mental health services scheduled in preparation for discharge.   Type of Residence Private residence   Home or Post Acute Services Community services   Patient expects to be discharged to: home with wife and daughter.   Patient Choice   Patient / Family choosing to utilize agency / facility established prior to hospitalization Yes     Phoned patient's wife: she stated she is glad he is getting help; believes he is doing better, agreed he does need his sleep;  stated she will not visit until Friday, the 8th as she has appointments and she thinks it would be good for him if she not visit for a couple of days;  she stated he absolutely will come home at discharge, stated she and her daughter are both at home to give him the round the clock care he needs;  She agreed he can follow up with mental health services through [facility].  He is still being stabilized with discharge possibly at the end of this week. Sw to follow

## 2024-03-06 NOTE — PROGRESS NOTES
"Donald Mcknight is a 73 y.o. year old male patient who is on U admission day 3.      Subjective   Donald Mcknight is a 73 y.o. year old male patient who was personally seen and interviewed, and discussed in morning team rounds. The patient was interviewed alone in a room (interviewed laying in bed, awake and alert, with his CPAP on), and was easily engaged and cooperative. This morning, Donald reports feeling \"not bad\" and currently rates his depression at an 8 out of 10. No current suicidal ideation or plans were elicited. He also rates his anxiety at a 6 out of 10. No hallucinations or paranoia were endorsed or noted.   Donald slept 5.25 hours last night (broken).          Objective   Mental Status Exam:   General: Appropriately groomed and dressed in hospital attire, laying in bed, awake and alert, with a CPAP.   Appearance: Appears stated age.   Attitude: Calm, cooperative.   Behavior: Appropriate eye contact.   Motor Activity: No agitation or retardation. No EPS/TD. Impaired gait and station. Normal muscle tone and bulk.   Speech: Regular rate, rhythm, volume and tone, spontaneous, fluent. Non-pressured. Occasionally poor articulation.   Mood: \"Not bad\"   Affect: Mostly pleasant.   Thought Process: Organized, and goal directed.   Thought Content: Does not currently endorse suicidal ideation or any suicide plans.   Does not endorse homicidal ideation.  No overt delusions or paranoia elicited.    Thought Perception: Does not endorse auditory or visual hallucinations, does not appear to be responding to hallucinatory stimuli.   Cognition: Alert, oriented x 3 currently. Adequate fund of knowledge. Possibly some deficit in recent and remote memory. No overt deficits in attention, concentration or language.   Insight: Poor-to-fair, as patient recognizes symptoms of  illness and need for recommended treatments.    Judgment: Poor-to-fair, as patient can make reasonable decisions about ordinary activities of daily " living and necessary medical care recommendations.       LABS:  Results for orders placed or performed during the hospital encounter of 03/03/24 (from the past 24 hour(s))   Transthoracic Echo (TTE) Complete   Result Value Ref Range    AV pk nadeem 1.28 m/s    AV mn grad 3.0 mmHg    LVOT diam 2.30 cm    LV biplane EF 52 %    MV avg E/e' ratio 13.28     MV E/A ratio 0.83     Tricuspid annular plane systolic excursion 1.5 cm    RV free wall pk S' 6.31 cm/s    LVIDd 4.10 cm    RVSP 5.2 mmHg    Aortic Valve Area by Continuity of VTI 4.28 cm2    Aortic Valve Area by Continuity of Peak Velocity 3.70 cm2    AV pk grad 6.6 mmHg    LV A4C EF 52.3    Basic Metabolic Panel   Result Value Ref Range    Glucose 97 74 - 99 mg/dL    Sodium 142 136 - 145 mmol/L    Potassium 3.6 3.5 - 5.3 mmol/L    Chloride 105 98 - 107 mmol/L    Bicarbonate 28 21 - 32 mmol/L    Anion Gap 13 10 - 20 mmol/L    Urea Nitrogen 19 6 - 23 mg/dL    Creatinine 1.14 0.50 - 1.30 mg/dL    eGFR 68 >60 mL/min/1.73m*2    Calcium 8.3 (L) 8.6 - 10.3 mg/dL        Last Recorded Vitals  Visit Vitals  /89 (Patient Position: Lying)   Pulse 86   Temp 36.4 °C (97.5 °F)   Resp 18        Intake/Output last 3 Shifts:  No intake/output data recorded.    Relevant Results  Scheduled medications  aspirin, 81 mg, oral, Daily  atorvastatin, 80 mg, oral, Nightly  calcium carbonate, 1,000 mg, oral, Daily  carvedilol, 12.5 mg, oral, BID  divalproex, 250 mg, oral, BID  famotidine, 20 mg, oral, Daily  FLUoxetine, 20 mg, oral, Daily  lidocaine, 1 patch, transdermal, Daily  lisinopril, 10 mg, oral, Daily  nystatin, , Topical, BID  pantoprazole, 40 mg, oral, Daily before breakfast  perflutren lipid microspheres, 0.5-10 mL of dilution, intravenous, Once in imaging      Continuous medications     PRN medications  PRN medications: acetaminophen, albuterol, alum-mag hydroxide-simeth, hydrOXYzine pamoate, melatonin, OLANZapine **OR** OLANZapine, oxygen, psyllium               Assessment/Plan    1) Other Specified Depressive Disorder       Plan: 1) Obtain more collateral information     Discussed potential risks, benefits, and alternatives to medications with patient, who consented to the above medications.     2) Delirium, acute - resolved       Plan: *1) trial Depakote 250 -> 125 mg Q17:00 and Q21:00     3) HTN       Plan: 1) IM service to follow and manage.     4) COPD       Plan: 1) IM service to follow and manage.     5) ARTEMIO       Plan: 1) IM service to follow and manage.     6) Diverticulitis       Plan: 1) IM service to follow and manage.     7) GERD       Plan: 1) IM service to follow and manage.    8) Right hip pain       Plan: 1) IM service to follow and manage.            Palomo Tiwari MD

## 2024-03-06 NOTE — PROGRESS NOTES
Occupational Therapy     REHAB Therapy Assessment & Treatment    Patient Name: Dnoald Mcknight  MRN: [id number]  Today's Date: 3/6/2024      Activity Assessment:     Positive Affirmations as a Coping Tool Group: [id number]  Community Resources Group: [id number]  Social Interaction/Decision Making Group: [id number]    0/3 Groups attended     Pt declines to attend any group participation this date despite encouragement. Pt observed to be lying in bed throughout all groups, CPAP in place and with some agitation with increased conversation. No HANKINS groups this date d/t same. Pt would benefit from continued OT services in order to improve overall self-esteem, personal confidence and supports with increased awareness for safe transition location at discharge.        Encounter Problems       Encounter Problems (Active)                 OT Goals       Pt will demo ADL routine and meaningful daily activities with Marina using modifications as needed  (Progressing)       Start:  03/05/24    Expected End:  04/02/24            Patient will attend to therapeutic task for 10 minutes with minimum verbal cues demonstrating improved attention and participation in daily activities.  (Progressing)       Start:  03/05/24    Expected End:  04/02/24            Pt will demo functional transfers to/ from EOB, chair and commode with Marina and LRD (Progressing)       Start:  03/05/24    Expected End:  04/02/24            Pt will ID/ utilize 1-2 ways to increase balance of activity/ re-involve self in functional daily routines/roles prior to discharge.   (Progressing)       Start:  03/05/24    Expected End:  04/02/24            Pt will explore and ID 1-2 strategies to manage stressors/symptoms of illness/ grief more effectively prior to discharge.   (Progressing)       Start:  03/05/24    Expected End:  04/02/24                               Additional Comments:  HANKINS collaborated with patients nurse and charge nurse throughout the day to provide the  appropriate support and encouragement to attend groups. Pt up on unit when HANKINS left last group of the day. All needs met.

## 2024-03-06 NOTE — CARE PLAN
Problem: Sensory Perceptual Alteration as Evidenced by  Goal: Cooperates with admission process  Outcome: Progressing  Goal: Patient/Family participate in treatment and discharge plans  Outcome: Progressing  Goal: Patient/Family verbalizes awareness of resources  Outcome: Progressing  Goal: Participates in unit activities  Outcome: Progressing  Goal: Discusses signs/symptoms of illness/treatment options  Outcome: Progressing  Goal: Initiates reality-based interactions  Outcome: Progressing  Goal: Able to discuss content of hallucinations/delusions  Outcome: Progressing  Goal: Notifies staff when experiencing hallucinations/delusions  Outcome: Progressing  Goal: Verbalizes reduction in hallucinations/delusions  Outcome: Progressing  Goal: Will not act on psychotic perception  Outcome: Progressing  Goal: Understands least restrictive measures  Outcome: Progressing  Goal: Free from restraint events  Outcome: Progressing     Problem: Altered Thought Processes as Evidenced by  Goal: STG - Desires improvement in ability to think and concentrate  Outcome: Progressing  Goal: STG - Participates in Occupational Therapy and other cognitive assessments  Outcome: Progressing     Problem: Potential for Harm to Self or Others  Goal: Cooperates with admission process  Outcome: Progressing  Goal: Participates in unit activities  Outcome: Progressing  Goal: Patient/Family participate in treatment and discharge plans  Outcome: Progressing  Goal: Identifies deescalation techniques  Outcome: Progressing  Goal: Understands least restrictive measures  Outcome: Progressing  Goal: Identifies stressors that lead to harmful behaviors  Outcome: Progressing  Goal: Notifies staff when experiencing harmful thoughts toward self/others  Outcome: Progressing  Goal: Denies harm toward self or others  Outcome: Progressing  Goal: Free from restraint events  Outcome: Progressing     Problem: Educational/Scholastic Disruption  Goal: Meets educational  requirements during hospitalization  Outcome: Progressing  Goal: Attends class without disruptive behavior  Outcome: Progressing  Goal: Completes daily assignments  Outcome: Progressing     Problem: Ineffective Coping  Goal: Cooperates with admission process  Outcome: Progressing  Goal: Identifies ineffective coping skills  Outcome: Progressing  Goal: Identifies healthy coping skills  Outcome: Progressing  Goal: Demonstrates healthy coping skills  Outcome: Progressing  Goal: Participates in unit activities  Outcome: Progressing  Goal: Patient/Family participate in treatment and discharge plans  Outcome: Progressing  Goal: Patient/Family verbalizes awareness of resources  Outcome: Progressing  Goal: Understands least restrictive measures  Outcome: Progressing  Goal: Free from restraint events  Outcome: Progressing     Problem: Alteration in Sleep  Goal: STG - Reports nightly sleep, duration, and quality  Outcome: Progressing  Goal: STG - Identifies sleep hygiene aids  Outcome: Progressing  Goal: STG - Informs staff if unable to sleep  Outcome: Progressing  Goal: STG - Attends breathing and relaxation group  Outcome: Progressing     Problem: Potential for Substance Withdrawal  Goal: Verbalizes signs/symptoms of withdrawal  Outcome: Progressing  Goal: Reports signs/symptoms of withdrawal  Outcome: Progressing  Goal: Free of withdrawal symptoms  Outcome: Progressing     Problem: Anxiety  Goal: Patient/family understands admission protocols  Outcome: Progressing  Goal: Attempts to manage anxiety with help  Outcome: Progressing  Goal: Verbalizes ways to manage anxiety  Outcome: Progressing  Goal: Implements measures to reduce anxiety  Outcome: Progressing  Goal: Free from restraint events  Outcome: Progressing     Problem: Self Care Deficit  Goal: STG - Patient completes hygiene  Outcome: Progressing  Goal: Increase group attendance  Outcome: Progressing  Goal: Accepts need for medications  Outcome: Progressing  Goal: STG  "- Goes to and eats meals independently in dining room 100% of time  Outcome: Progressing     Problem: Defensive Coping  Goal: Cooperates with admission process  Outcome: Progressing  Goal: Identifies reckless/dangerous behavior  Outcome: Progressing  Goal: Identifies stressors that lead to reckless/dangerous behavior  Outcome: Progressing  Goal: Discusses and identifies healthy coping skills  Outcome: Progressing  Goal: Demonstrates healthy coping skills  Outcome: Progressing  Goal: Identifies appropriate social interaction  Outcome: Progressing  Goal: Demonstrates appropriate social interactions  Outcome: Progressing  Goal: Patient/Family verbalizes awareness of resources  Outcome: Progressing  Goal: Discusses signs/symptoms of illness/treatment options  Outcome: Progressing  Goal: Patient/Family participate in treatment and discharge plans  Outcome: Progressing  Goal: Understands least restrictive measures  Outcome: Progressing  Goal: Free from restraint events  Outcome: Progressing     Problem: Pain  Goal: My pain/discomfort is manageable  Outcome: Progressing   The patient's goals for the shift include \"sleep\"    The clinical goals for the shift include medication compliance'    Pt had uneventful night. Slept with minimal interruptions. Vitals stable.   "

## 2024-03-06 NOTE — CARE PLAN
"The patient's goals for the shift include \"sleep\"    The clinical goals for the shift include medication compliance'    Problem: Sensory Perceptual Alteration as Evidenced by  Goal: Participates in unit activities  Outcome: Progressing     Problem: Ineffective Coping  Goal: Identifies healthy coping skills  Outcome: Progressing  Goal: Demonstrates healthy coping skills  Outcome: Progressing     Over the shift, the patient did make progress toward the following goals.   "

## 2024-03-06 NOTE — GROUP NOTE
Group Topic: Dialectical Behavioral Therapy - Emotional Regulation   Group Date: 3/6/2024  Start Time: 1545  End Time: 1615  Facilitators: TAN Dos Santos   Department: [facility] REHAB THERAPY VIRTUAL    Number of Participants: 4   Group Focus: coping skills, dual diagnosis, goals, personal responsibility, and self-awareness  Treatment Modality: Other: Recreational Therapy   Interventions utilized were exploration, patient education, and support  Purpose: coping skills, insight or knowledge, and self-care    Name: Donald Mcknight YOB: 1950   MR: [id number]      Facilitator: Recreational Therapist  Level of Participation: did not attend  Progress: None  Comments: Patient declined invitation to group activity at this time. Patient will continue to be provided with opportunities to enhance leisure skills and/or coping mechanisms.  Plan: continue with services

## 2024-03-06 NOTE — CARE PLAN
"The patient's goals for the shift include \"sleep\"    The clinical goals for the shift include treatment compliant    Over the shift, the patient did not make progress toward the following goals. Barriers to progression include acuteness of illness. Recommendations to address these barriers include positive reinforcement.    Problem: Sensory Perceptual Alteration as Evidenced by  Goal: Cooperates with admission process  Outcome: Progressing  Goal: Patient/Family participate in treatment and discharge plans  Outcome: Progressing  Goal: Patient/Family verbalizes awareness of resources  Outcome: Progressing  Goal: Participates in unit activities  Outcome: Progressing  Goal: Discusses signs/symptoms of illness/treatment options  Outcome: Progressing  Goal: Initiates reality-based interactions  Outcome: Progressing  Goal: Able to discuss content of hallucinations/delusions  Outcome: Progressing  Goal: Notifies staff when experiencing hallucinations/delusions  Outcome: Progressing  Goal: Verbalizes reduction in hallucinations/delusions  Outcome: Progressing  Goal: Will not act on psychotic perception  Outcome: Progressing  Goal: Understands least restrictive measures  Outcome: Progressing  Goal: Free from restraint events  Outcome: Progressing     Problem: Altered Thought Processes as Evidenced by  Goal: STG - Desires improvement in ability to think and concentrate  Outcome: Progressing  Goal: STG - Participates in Occupational Therapy and other cognitive assessments  Outcome: Progressing     Problem: Potential for Harm to Self or Others  Goal: Cooperates with admission process  Outcome: Progressing  Goal: Participates in unit activities  Outcome: Progressing  Goal: Patient/Family participate in treatment and discharge plans  Outcome: Progressing  Goal: Identifies deescalation techniques  Outcome: Progressing  Goal: Understands least restrictive measures  Outcome: Progressing  Goal: Identifies stressors that lead to " harmful behaviors  Outcome: Progressing  Goal: Notifies staff when experiencing harmful thoughts toward self/others  Outcome: Progressing  Goal: Denies harm toward self or others  Outcome: Progressing  Goal: Free from restraint events  Outcome: Progressing     Problem: Educational/Scholastic Disruption  Goal: Meets educational requirements during hospitalization  Outcome: Progressing  Goal: Attends class without disruptive behavior  Outcome: Progressing  Goal: Completes daily assignments  Outcome: Progressing     Problem: Ineffective Coping  Goal: Cooperates with admission process  Outcome: Progressing  Goal: Identifies ineffective coping skills  Outcome: Progressing  Goal: Identifies healthy coping skills  Outcome: Progressing  Goal: Demonstrates healthy coping skills  Outcome: Progressing  Goal: Participates in unit activities  Outcome: Progressing  Goal: Patient/Family participate in treatment and discharge plans  Outcome: Progressing  Goal: Patient/Family verbalizes awareness of resources  Outcome: Progressing  Goal: Understands least restrictive measures  Outcome: Progressing  Goal: Free from restraint events  Outcome: Progressing     Problem: Alteration in Sleep  Goal: STG - Reports nightly sleep, duration, and quality  Outcome: Progressing  Goal: STG - Identifies sleep hygiene aids  Outcome: Progressing  Goal: STG - Informs staff if unable to sleep  Outcome: Progressing  Goal: STG - Attends breathing and relaxation group  Outcome: Progressing     Problem: Potential for Substance Withdrawal  Goal: Verbalizes signs/symptoms of withdrawal  Outcome: Progressing  Goal: Reports signs/symptoms of withdrawal  Outcome: Progressing  Goal: Free of withdrawal symptoms  Outcome: Progressing     Problem: Anxiety  Goal: Patient/family understands admission protocols  Outcome: Progressing  Goal: Attempts to manage anxiety with help  Outcome: Progressing  Goal: Verbalizes ways to manage anxiety  Outcome: Progressing  Goal:  Implements measures to reduce anxiety  Outcome: Progressing  Goal: Free from restraint events  Outcome: Progressing     Problem: Self Care Deficit  Goal: STG - Patient completes hygiene  Outcome: Progressing  Goal: Increase group attendance  Outcome: Progressing  Goal: Accepts need for medications  Outcome: Progressing  Goal: STG - Goes to and eats meals independently in dining room 100% of time  Outcome: Progressing     Problem: Defensive Coping  Goal: Cooperates with admission process  Outcome: Progressing  Goal: Identifies reckless/dangerous behavior  Outcome: Progressing  Goal: Identifies stressors that lead to reckless/dangerous behavior  Outcome: Progressing  Goal: Discusses and identifies healthy coping skills  Outcome: Progressing  Goal: Demonstrates healthy coping skills  Outcome: Progressing  Goal: Identifies appropriate social interaction  Outcome: Progressing  Goal: Demonstrates appropriate social interactions  Outcome: Progressing  Goal: Patient/Family verbalizes awareness of resources  Outcome: Progressing  Goal: Discusses signs/symptoms of illness/treatment options  Outcome: Progressing  Goal: Patient/Family participate in treatment and discharge plans  Outcome: Progressing  Goal: Understands least restrictive measures  Outcome: Progressing  Goal: Free from restraint events  Outcome: Progressing     Problem: Pain  Goal: My pain/discomfort is manageable  Outcome: Progressing

## 2024-03-06 NOTE — NURSING NOTE
"  03/05/2024 @2100- Pt was bright and cooperative. Pt is pleasantly confused at times. Pt rated anxiety 7/10, depression 4/10, pt denied SI/HI, pt did endorse visual hallucinations of a \"butterfly.\" Pt rated pain 3/10 in his left knee PRN tylenol given. Pt stated his goal is \"sleep,\" and his coping skill is \"fishing.\" Pt stated his strengths are \"gentle and I like people.\" Pt is currently sleeping in bed without any signs or symptoms of distress. No new orders to carry out at this time. Q15 minute safety checks maintained.              "

## 2024-03-06 NOTE — GROUP NOTE
Group Topic: Gross Motor/Balance Skills   Group Date: 3/6/2024  Start Time: 1400  End Time: 1445  Facilitators: ANUPAMA Dos SantosS   Department: [facility] REHAB THERAPY VIRTUAL    Number of Participants: 5   Group Focus: coping skills and leisure skills  Treatment Modality: Recreation Therapy  Interventions utilized were leisure development and other physical activity  Purpose: coping skills and other: leisure awareness    Name: Donald Mcknight YOB: 1950   MR: [id number]      Facilitator: Recreational Therapist  Level of Participation: did not attend  Progress: None  Comments: This physical leisure activity involved learning and participating in the game “Zigabidetball”. All participants were explained the rules (adapted) and provided examples of how to perform session tasks. Patients were put onto teams and offered multiple games. CTRS encouraged social interactions and prompted discussion around physical leisure.     Participation: Patient remained in his room during this session.   Plan: continue with services

## 2024-03-07 PROCEDURE — 2500000005 HC RX 250 GENERAL PHARMACY W/O HCPCS: Performed by: NURSE PRACTITIONER

## 2024-03-07 PROCEDURE — 1240000001 HC SEMI-PRIVATE BH ROOM DAILY

## 2024-03-07 PROCEDURE — 2500000001 HC RX 250 WO HCPCS SELF ADMINISTERED DRUGS (ALT 637 FOR MEDICARE OP): Performed by: NURSE PRACTITIONER

## 2024-03-07 PROCEDURE — 2500000004 HC RX 250 GENERAL PHARMACY W/ HCPCS (ALT 636 FOR OP/ED): Performed by: NURSE PRACTITIONER

## 2024-03-07 PROCEDURE — 2500000001 HC RX 250 WO HCPCS SELF ADMINISTERED DRUGS (ALT 637 FOR MEDICARE OP): Performed by: PSYCHIATRY & NEUROLOGY

## 2024-03-07 PROCEDURE — 94660 CPAP INITIATION&MGMT: CPT

## 2024-03-07 PROCEDURE — 97530 THERAPEUTIC ACTIVITIES: CPT | Mod: GP,CQ

## 2024-03-07 PROCEDURE — 99232 SBSQ HOSP IP/OBS MODERATE 35: CPT | Performed by: PSYCHIATRY & NEUROLOGY

## 2024-03-07 PROCEDURE — 2500000002 HC RX 250 W HCPCS SELF ADMINISTERED DRUGS (ALT 637 FOR MEDICARE OP, ALT 636 FOR OP/ED): Performed by: PSYCHIATRY & NEUROLOGY

## 2024-03-07 RX ADMIN — CARVEDILOL 12.5 MG: 12.5 TABLET, FILM COATED ORAL at 08:47

## 2024-03-07 RX ADMIN — METHYLPREDNISOLONE 20 MG: 4 TABLET ORAL at 09:10

## 2024-03-07 RX ADMIN — LIDOCAINE 1 PATCH: 4 PATCH TOPICAL at 08:47

## 2024-03-07 RX ADMIN — ALUMINUM HYDROXIDE, MAGNESIUM HYDROXIDE, AND DIMETHICONE 30 ML: 200; 20; 200 SUSPENSION ORAL at 03:10

## 2024-03-07 RX ADMIN — NYSTATIN: 100000 OINTMENT TOPICAL at 21:00

## 2024-03-07 RX ADMIN — FLUOXETINE HYDROCHLORIDE 20 MG: 20 CAPSULE ORAL at 08:45

## 2024-03-07 RX ADMIN — Medication 5 MG: at 20:56

## 2024-03-07 RX ADMIN — DIVALPROEX SODIUM 125 MG: 125 TABLET, DELAYED RELEASE ORAL at 17:01

## 2024-03-07 RX ADMIN — CARVEDILOL 12.5 MG: 12.5 TABLET, FILM COATED ORAL at 20:55

## 2024-03-07 RX ADMIN — LISINOPRIL 10 MG: 5 TABLET ORAL at 08:46

## 2024-03-07 RX ADMIN — ATORVASTATIN CALCIUM 80 MG: 80 TABLET, FILM COATED ORAL at 20:55

## 2024-03-07 RX ADMIN — PANTOPRAZOLE SODIUM 40 MG: 40 TABLET, DELAYED RELEASE ORAL at 06:01

## 2024-03-07 RX ADMIN — CALCIUM CARBONATE (ANTACID) CHEW TAB 500 MG 1000 MG: 500 CHEW TAB at 09:00

## 2024-03-07 RX ADMIN — ALUMINUM HYDROXIDE, MAGNESIUM HYDROXIDE, AND DIMETHICONE 30 ML: 200; 20; 200 SUSPENSION ORAL at 23:07

## 2024-03-07 RX ADMIN — HYDROXYZINE PAMOATE 25 MG: 25 CAPSULE ORAL at 03:10

## 2024-03-07 RX ADMIN — FAMOTIDINE 20 MG: 20 TABLET ORAL at 08:44

## 2024-03-07 RX ADMIN — ACETAMINOPHEN 650 MG: 325 TABLET ORAL at 09:30

## 2024-03-07 RX ADMIN — ASPIRIN 81 MG: 81 TABLET, COATED ORAL at 08:47

## 2024-03-07 RX ADMIN — NYSTATIN: 100000 OINTMENT TOPICAL at 09:45

## 2024-03-07 ASSESSMENT — COGNITIVE AND FUNCTIONAL STATUS - GENERAL
MOVING TO AND FROM BED TO CHAIR: A LOT
WALKING IN HOSPITAL ROOM: A LOT
CLIMB 3 TO 5 STEPS WITH RAILING: TOTAL
TURNING FROM BACK TO SIDE WHILE IN FLAT BAD: A LOT
MOBILITY SCORE: 11
MOVING FROM LYING ON BACK TO SITTING ON SIDE OF FLAT BED WITH BEDRAILS: A LOT
STANDING UP FROM CHAIR USING ARMS: A LOT

## 2024-03-07 ASSESSMENT — PAIN - FUNCTIONAL ASSESSMENT
PAIN_FUNCTIONAL_ASSESSMENT: 0-10
PAIN_FUNCTIONAL_ASSESSMENT: 0-10

## 2024-03-07 ASSESSMENT — PAIN SCALES - GENERAL
PAINLEVEL_OUTOF10: 5 - MODERATE PAIN
PAINLEVEL_OUTOF10: 8

## 2024-03-07 NOTE — PROGRESS NOTES
"Physical Therapy    Physical Therapy Treatment    Patient Name: Donald Mcknight  MRN: [id number]  Today's Date: 3/7/2024  Time Calculation  Start Time: 0915  Stop Time: 0939  Time Calculation (min): 24 min       Assessment/Plan   PT Assessment  PT Assessment Results: Decreased strength, Decreased endurance, Impaired balance, Decreased mobility, Decreased cognition (wilingness)  Barriers to Discharge: cognition  End of Session Communication: Bedside nurse  End of Session Patient Position: Up in chair, Alarm off, caregiver present     PT Plan  Treatment/Interventions: Bed mobility, Transfer training, Gait training, Balance training, Strengthening, Endurance training, Range of motion, Therapeutic exercise, Therapeutic activity  PT Plan: Skilled PT  PT Frequency: 3 times per week  PT Discharge Recommendations: Moderate intensity level of continued care  Equipment Recommended upon Discharge: Wheeled walker (FWWq)  PT Recommended Transfer Status: Assist x2  PT - OK to Discharge: Yes      General Visit Information:   PT  Visit  PT Received On: 03/07/24  General  Reason for Referral: 72 yo male admitted for depression and SI. Referred to PT for impaired mobility  Past Medical History Relevant to Rehab: COPD and on 2-3 L, HTN, ARTEMIO, HPLD, depression, PTSD, GERD & obesity, OA of L knee, (patietn on RA during tx and RN aware and present entire tx)  Prior to Session Communication: Bedside nurse  Patient Position Received:  (in a BHU suine sideways across bed, 4 raisl up , no alarm on bed, inc of urine, breakfast spilled all over tray and AXOX2-3)  General Comment: disoriented, minimally agreeable after encoragement, c/o right chest pain \"I have 4 cracked ribs\", RN present and aware.    Subjective   Precautions:     Vital Signs:       Objective   Pain:  Pain Assessment  Pain Assessment: 0-10 (appears intermittant noted by pateint willing to move and then at brief moments will c/o pain)  Pain Score: 8  Cognition:  Cognition  Overall " "Cognitive Status: Impaired  Orientation Level: Disoriented to place, Disoriented to situation  Safety Judgment: Decreased awareness of need for assistance  Safety/Judgement: Exceptions to WFL  Impulsive: Mildly  Postural Control:     Extremity/Trunk Assessments:    Activity Tolerance:  Activity Tolerance  Endurance: Tolerates 10 - 20 min exercise with multiple rests  Treatments:  Therapeutic Exercise  Therapeutic Exercise Performed: No    Therapeutic Activity  Therapeutic Activity Performed: Yes  Therapeutic Activity 1: personal care & donning briefs, (Max A x1)    Bed Mobility  Bed Mobility: Yes  Bed Mobility 1  Bed Mobility 1: Supine to sitting, Log roll  Level of Assistance 1: Moderate assistance, Moderate verbal cues, Moderate tactile cues    Ambulation/Gait Training  Ambulation/Gait Training Performed: Yes  Transfers  Transfer: No (unsteday with soft knees in standing and pateitn kept stating \"I cant wal, I cant walk....stand pivot to chair competed with Max x2 and FWW)  Transfer 1  Transfer From 1: Sit to  Transfer to 1: Stand  Technique 1: To right, Stand pivot, Sit to stand, Stand to sit  Transfer Device 1: Walker  Transfer Level of Assistance 1: Maximum assistance, +2 (STS x2 and stand pivot x1)         Outcome Measures:  [facility] Basic Mobility  Turning from your back to your side while in a flat bed without using bedrails: A lot  Moving from lying on your back to sitting on the side of a flat bed without using bedrails: A lot  Moving to and from bed to chair (including a wheelchair): A lot  Standing up from a chair using your arms (e.g. wheelchair or bedside chair): A lot  To walk in hospital room: A lot  Climbing 3-5 steps with railing: Total  Basic Mobility - Total Score: 11    Education Documentation  Body Mechanics, taught by Lian Tristan PTA at 3/7/2024 10:18 AM.  Learner: Patient  Readiness: Nonacceptance  Method: Explanation  Response: Needs Reinforcement    Body Mechanics, taught by Lian Tristan" PTA at 3/7/2024 10:18 AM.  Learner: Patient  Readiness: Nonacceptance  Method: Explanation  Response: Needs Reinforcement    Mobility Training, taught by Lian Tristan PTA at 3/7/2024 10:18 AM.  Learner: Patient  Readiness: Nonacceptance  Method: Explanation  Response: Needs Reinforcement    Education Comments  No comments found.        OP EDUCATION:       Encounter Problems       Encounter Problems (Active)       Balance       Pt will demo dynamic sitting balance x5 minutes with no UE support and S/I to improve stability and decrease falls  (Progressing)       Start:  03/05/24    Expected End:  03/19/24            Pt will demo static/dynamic standing balance x5 minutes with B UE support and S/I to improve stability and decrease falls  (Progressing)       Start:  03/05/24    Expected End:  03/19/24               Mobility       X15 reps ther ex to increase general strength and improve functional independence   (Not Progressing)       Start:  03/05/24    Expected End:  03/19/24            X150 feet with/without use of DME and S/I assist  necessary to initiate return to PLOF  (Not Progressing)       Start:  03/05/24    Expected End:  03/19/24            pt will tolerate 30 minutes with 1 rest breaks and maintaining O2 sats >88% on baseline 3L O2 necessary for improved functional endurance   (Not Progressing)       Start:  03/05/24    Expected End:  03/19/24               Transfers       Pt will complete all functional transfers with S/I necessary to initiate return to PLOF   (Progressing)       Start:  03/05/24    Expected End:  03/19/24

## 2024-03-07 NOTE — CARE PLAN
Care Plan initiated. Pt will be encouraged to attend groups to meet set goals. 1:1s will be completed as requested.

## 2024-03-07 NOTE — PROGRESS NOTES
Occupational Therapy                 Therapy Communication Note    Patient Name: Donald Mcknight  MRN: [id number]  Today's Date: 3/7/2024     Discipline: Occupational Therapy    Missed Visit Reason: Missed Visit Reason: Other (Comment) (MoCA attempted at 12:00, pt soundly sleeping, attempted multiple prompts and stimulation to waken pt with no success. Not appropriate for MoCA assessment at this time.)    Missed Time: Attempt

## 2024-03-07 NOTE — CARE PLAN
"The patient's goals for the shift include \"keep pain down\"    The clinical goals for the shift include maintain safety    Over the shift, the patient made progress towards care plan goals.     However pt was restless all night long and did not really sleep. Pt calling out all through the night, grunting and groaning, yelling out various names, calling for help. Pt is still very disorganized and does not keep still. Would not keep CPAP on. Asking about the \"tv\" in the room. PRN maalox given twice this shift, vistaril 25 mg, and melatonin. Pt still complaining of pain 12/10 in chest/ribcage area.  Incontinent of both urine and stool. Stool is still liquid. Pt has remained pleasant and cooperative throughout shift.  "

## 2024-03-07 NOTE — GROUP NOTE
Group Topic: Excercise/Physical    Group Date: 3/7/2024  Start Time: 1400  End Time: 1505  Facilitators: ANUPAMA FitzpatrickS   Department: [facility] REHAB THERAPY VIRTUAL    Number of Participants: 5   Group Focus: physical, team building, social interaction  Treatment Modality: Recreation Therapy  Interventions utilized were exploration, leisure development, and other exercise  Purpose: coping skills, leisure development, exercise    Name: Donald Mcknight YOB: 1950   MR: [id number]      Facilitator: Recreational Therapist  Level of Participation: did not attend  Progress: None  Comments: Patients engaged in the skilled session of [place]. This recreational pursuit honed participants' hand-eye coordination and fine motor skills along with cultivating a convivial atmosphere, promoting social interaction and fostering a sense of achievement. The strategic nuances of the game also elevated both cognitive engagement and camaraderie contributing to the multifaceted well-being of those involved in this engaging therapeutic activity. Patient declined invitation to group activity at this time. Patient will continue to be provided with opportunities to enhance leisure skills and/or coping mechanisms.  Plan: continue with services

## 2024-03-07 NOTE — PROGRESS NOTES
"Donald Mcknight is a 73 y.o. year old male patient who is on U admission day 4.      Subjective   Donald Mcknight is a 73 y.o. year old male patient who was personally seen and interviewed, and discussed in morning team rounds. The patient was interviewed alone in his room (interviewed sitting up in a chair - with nurse Lian present working on his bed), and was easily engaged and cooperative. This morning, Donald reports feeling \"not good at all\" and currently rates his depression at a 10 out of 10. No current suicidal ideation or plans were elicited. He also rates his anxiety at a 10 out of 10. No overt hallucinations or paranoia were endorsed or noted. However, Donald reported seeing a \"mouse-like shadow on the wall last night 2-3 different times (consistent with possible VH versus an illusion).   Donald slept 5 hours last night (broken).          Objective   Mental Status Exam:   General: Appropriately groomed and dressed in some hospital attire, sitting up in a chair.   Appearance: Appears stated age.   Attitude: Calm, cooperative.   Behavior: Appropriate eye contact.   Motor Activity: No agitation or retardation. No EPS/TD. Impaired gait and station. Normal muscle tone and bulk.   Speech: Regular rate, rhythm, volume and tone, spontaneous, fluent. Non-pressured. Occasionally poor articulation.   Mood: \"Not good at all\"   Affect: Mostly pleasant.   Thought Process: Organized, and goal directed.   Thought Content: Does not currently endorse suicidal ideation or any suicide plans.   Does not endorse homicidal ideation.  No overt delusions or paranoia elicited.    Thought Perception: Does not endorse auditory or visual hallucinations (see HPI).   Cognition: Alert, oriented x 3 currently. Adequate fund of knowledge. Possibly some deficit in recent and remote memory. No overt deficits in attention, concentration or language.   Insight: Poor-to-fair, as patient recognizes symptoms of  illness and need for " recommended treatments.    Judgment: Poor-to-fair, as patient can make reasonable decisions about ordinary activities of daily living and necessary medical care recommendations.       LABS:  No results found for this or any previous visit (from the past 24 hour(s)).       Last Recorded Vitals  Visit Vitals  /82 (BP Location: Right arm, Patient Position: Lying)   Pulse 90   Temp 36.5 °C (97.7 °F) (Temporal)   Resp 18        Intake/Output last 3 Shifts:  No intake/output data recorded.    Relevant Results  Scheduled medications  aspirin, 81 mg, oral, Daily  atorvastatin, 80 mg, oral, Nightly  calcium carbonate, 1,000 mg, oral, Daily  carvedilol, 12.5 mg, oral, BID  divalproex, 125 mg, oral, BID  famotidine, 20 mg, oral, Daily  FLUoxetine, 20 mg, oral, Daily  lidocaine, 1 patch, transdermal, Daily  lisinopril, 10 mg, oral, Daily  [START ON 3/8/2024] methylPREDNISolone, 16 mg, oral, Once   Followed by  [START ON 3/9/2024] methylPREDNISolone, 12 mg, oral, Once   Followed by  [START ON 3/10/2024] methylPREDNISolone, 8 mg, oral, Once   Followed by  [START ON 3/11/2024] methylPREDNISolone, 4 mg, oral, Once  nystatin, , Topical, BID  pantoprazole, 40 mg, oral, Daily before breakfast  perflutren lipid microspheres, 0.5-10 mL of dilution, intravenous, Once in imaging      Continuous medications     PRN medications  PRN medications: acetaminophen, albuterol, alum-mag hydroxide-simeth, hydrOXYzine pamoate, melatonin, OLANZapine **OR** OLANZapine, oxygen, psyllium               Assessment/Plan   1) Other Specified Depressive Disorder       Plan: 1) Fluoxetine 20 mg Qdaily (continue)     Discussed potential risks, benefits, and alternatives to medications with patient, who consented to the above medications.     2) Delirium, acute - resolved       Plan: *1) Discontinue trial Depakote 250 -> 125 > 0.0 mg Q17:00 and Q21:00     3) HTN       Plan: 1) IM service to follow and manage.     4) COPD       Plan: 1) IM service to  follow and manage.     5) ARTEMIO       Plan: 1) IM service to follow and manage.     6) Diverticulitis       Plan: 1) IM service to follow and manage.     7) GERD       Plan: 1) IM service to follow and manage.    8) Right hip pain       Plan: 1) IM service to follow and manage.            Palomo Tiwari MD

## 2024-03-07 NOTE — GROUP NOTE
Group Topic: Coping Skills   Group Date: 3/7/2024  Start Time: 1600  End Time: 1650  Facilitators: ANUPAMA FitzpatrikcS   Department: [facility] REHAB THERAPY VIRTUAL    Number of Participants: 10   Group Focus: other managing mental health, leisure awareness and psychiatric education  Treatment Modality: Recreation Therapy  Interventions utilized were leisure development, story telling, and support  Purpose: coping skills and self-care    Name: Donald Mcknight YOB: 1950   MR: [id number]      Facilitator: Recreational Therapist  Level of Participation: did not attend  Progress: None  Comments: Session involved education on managing mental health and leisure coping strategies. Participants were provided tips on ways to work at managing mental health symptoms which included simplifying goals, eating healthier, getting exercise, building better support systems, scheduling activities, and using relaxation skills. Patients were also asked to think about associating specific leisure interests to specific life tasks or coping situations. Participants were provided a handout which accompanied the discussion.     Patient declined invitation to group activity at this time. Patient will continue to be provided with opportunities to enhance leisure skills and/or coping mechanisms.   Plan: continue with services

## 2024-03-07 NOTE — NURSING NOTE
"Upon assessment pt is calm, cooperative, and friendly. Pt said he has \"a lot\" of anxiety. Said yes to depression. Pain 12/10 in chest that has been chronic. Given mylanta with night medications. Pt reported his strength as \"traveling\" and goal as \"to keep pain down\". Pt took all nighttime medications, given a PRN melatonin. Pt was incontinent of bowel shortly after snack time and cleaned up accordingly.  "

## 2024-03-07 NOTE — PROGRESS NOTES
"Occupational Therapy     REHAB Therapy Assessment & Treatment    Patient Name: Donald Mcknight  MRN: [id number]  Today's Date: 3/7/2024      Activity Assessment:       Happiness Survey/Quality of Life Group: [phone]  Phases of Life and Positive Changes Group: [id number]  Pet Therapy as a Coping Tool Group: [id number]    0/3 groups attended and Tx attempt made at 1320    Pt remains in bed throughout all groups initially heard yelling from his room intermittently. Per report, pt \"did not sleep well over night, screaming in pain\" Upon arrival for Tx session, pt sleeping soundly and does not arouse when this writer calls out his name several times. No HANKINS groups or Tx session this date as stated above. Pt would benefit from continued OT services in order to improve overall self-esteem, personal confidence and supports with increased awareness for safe transition location at discharge.                                                  OT Goals       Pt will demo ADL routine and meaningful daily activities with Marina using modifications as needed  (Progressing)       Start:  03/05/24    Expected End:  04/02/24            Patient will attend to therapeutic task for 10 minutes with minimum verbal cues demonstrating improved attention and participation in daily activities.  (Progressing)       Start:  03/05/24    Expected End:  04/02/24            Pt will demo functional transfers to/ from EOB, chair and commode with Marina and LRD (Progressing)       Start:  03/05/24    Expected End:  04/02/24            Pt will ID/ utilize 1-2 ways to increase balance of activity/ re-involve self in functional daily routines/roles prior to discharge.   (Progressing)       Start:  03/05/24    Expected End:  04/02/24            Pt will explore and ID 1-2 strategies to manage stressors/symptoms of illness/ grief more effectively prior to discharge.   (Progressing)       Start:  03/05/24    Expected End:  04/02/24                     "               Additional Comments:  HANKINS collaborated with patients nurse and charge nurse throughout the day to provide the appropriate support and encouragement to attend groups. Pt up on unit when HANKINS left last group of the day. All needs met.

## 2024-03-08 PROCEDURE — 2500000001 HC RX 250 WO HCPCS SELF ADMINISTERED DRUGS (ALT 637 FOR MEDICARE OP): Performed by: PSYCHIATRY & NEUROLOGY

## 2024-03-08 PROCEDURE — 2500000001 HC RX 250 WO HCPCS SELF ADMINISTERED DRUGS (ALT 637 FOR MEDICARE OP): Performed by: NURSE PRACTITIONER

## 2024-03-08 PROCEDURE — 1240000001 HC SEMI-PRIVATE BH ROOM DAILY

## 2024-03-08 PROCEDURE — 2500000005 HC RX 250 GENERAL PHARMACY W/O HCPCS: Performed by: NURSE PRACTITIONER

## 2024-03-08 PROCEDURE — 2500000004 HC RX 250 GENERAL PHARMACY W/ HCPCS (ALT 636 FOR OP/ED): Performed by: NURSE PRACTITIONER

## 2024-03-08 PROCEDURE — 94660 CPAP INITIATION&MGMT: CPT

## 2024-03-08 PROCEDURE — 2500000002 HC RX 250 W HCPCS SELF ADMINISTERED DRUGS (ALT 637 FOR MEDICARE OP, ALT 636 FOR OP/ED): Performed by: PSYCHIATRY & NEUROLOGY

## 2024-03-08 PROCEDURE — 99232 SBSQ HOSP IP/OBS MODERATE 35: CPT | Performed by: PSYCHIATRY & NEUROLOGY

## 2024-03-08 PROCEDURE — 97530 THERAPEUTIC ACTIVITIES: CPT | Mod: GO

## 2024-03-08 RX ORDER — MIRTAZAPINE 15 MG/1
7.5 TABLET, FILM COATED ORAL NIGHTLY
Status: DISCONTINUED | OUTPATIENT
Start: 2024-03-08 | End: 2024-03-10

## 2024-03-08 RX ORDER — KETOROLAC TROMETHAMINE 10 MG/1
10 TABLET, FILM COATED ORAL EVERY 6 HOURS PRN
Status: DISPENSED | OUTPATIENT
Start: 2024-03-08 | End: 2024-03-14

## 2024-03-08 RX ORDER — IBUPROFEN 400 MG/1
400 TABLET ORAL EVERY 6 HOURS PRN
Status: DISCONTINUED | OUTPATIENT
Start: 2024-03-08 | End: 2024-03-08

## 2024-03-08 RX ORDER — IBUPROFEN 400 MG/1
400 TABLET ORAL ONCE
Status: COMPLETED | OUTPATIENT
Start: 2024-03-08 | End: 2024-03-08

## 2024-03-08 RX ADMIN — ACETAMINOPHEN 650 MG: 325 TABLET ORAL at 08:54

## 2024-03-08 RX ADMIN — FAMOTIDINE 20 MG: 20 TABLET ORAL at 08:53

## 2024-03-08 RX ADMIN — PANTOPRAZOLE SODIUM 40 MG: 40 TABLET, DELAYED RELEASE ORAL at 06:18

## 2024-03-08 RX ADMIN — ALUMINUM HYDROXIDE, MAGNESIUM HYDROXIDE, AND DIMETHICONE 30 ML: 200; 20; 200 SUSPENSION ORAL at 10:40

## 2024-03-08 RX ADMIN — LIDOCAINE 1 PATCH: 4 PATCH TOPICAL at 08:51

## 2024-03-08 RX ADMIN — FLUOXETINE HYDROCHLORIDE 20 MG: 20 CAPSULE ORAL at 08:53

## 2024-03-08 RX ADMIN — CARVEDILOL 12.5 MG: 12.5 TABLET, FILM COATED ORAL at 21:49

## 2024-03-08 RX ADMIN — CALCIUM CARBONATE (ANTACID) CHEW TAB 500 MG 1000 MG: 500 CHEW TAB at 08:52

## 2024-03-08 RX ADMIN — ACETAMINOPHEN 650 MG: 325 TABLET ORAL at 00:30

## 2024-03-08 RX ADMIN — ASPIRIN 81 MG: 81 TABLET, COATED ORAL at 08:54

## 2024-03-08 RX ADMIN — MIRTAZAPINE 7.5 MG: 15 TABLET, FILM COATED ORAL at 21:49

## 2024-03-08 RX ADMIN — ACETAMINOPHEN 650 MG: 325 TABLET ORAL at 22:50

## 2024-03-08 RX ADMIN — LISINOPRIL 10 MG: 5 TABLET ORAL at 08:53

## 2024-03-08 RX ADMIN — NYSTATIN 1 APPLICATION: 100000 OINTMENT TOPICAL at 08:58

## 2024-03-08 RX ADMIN — DIVALPROEX SODIUM 125 MG: 125 TABLET, DELAYED RELEASE ORAL at 18:08

## 2024-03-08 RX ADMIN — HYDROXYZINE PAMOATE 25 MG: 25 CAPSULE ORAL at 22:50

## 2024-03-08 RX ADMIN — IBUPROFEN 400 MG: 400 TABLET, FILM COATED ORAL at 04:33

## 2024-03-08 RX ADMIN — ATORVASTATIN CALCIUM 80 MG: 80 TABLET, FILM COATED ORAL at 21:49

## 2024-03-08 RX ADMIN — Medication 5 MG: at 22:51

## 2024-03-08 RX ADMIN — DIVALPROEX SODIUM 125 MG: 125 TABLET, DELAYED RELEASE ORAL at 06:18

## 2024-03-08 RX ADMIN — CARVEDILOL 12.5 MG: 12.5 TABLET, FILM COATED ORAL at 08:53

## 2024-03-08 RX ADMIN — METHYLPREDNISOLONE 16 MG: 4 TABLET ORAL at 08:57

## 2024-03-08 ASSESSMENT — PAIN SCALES - GENERAL
PAINLEVEL_OUTOF10: 9
PAINLEVEL_OUTOF10: 2
PAINLEVEL_OUTOF10: 5 - MODERATE PAIN
PAINLEVEL_OUTOF10: 0 - NO PAIN
PAINLEVEL_OUTOF10: 4
PAINLEVEL_OUTOF10: 6
PAINLEVEL_OUTOF10: 0 - NO PAIN

## 2024-03-08 ASSESSMENT — COGNITIVE AND FUNCTIONAL STATUS - GENERAL
TOILETING: A LOT
DRESSING REGULAR LOWER BODY CLOTHING: A LOT
PERSONAL GROOMING: A LITTLE
HELP NEEDED FOR BATHING: A LOT
DAILY ACTIVITIY SCORE: 14
EATING MEALS: A LITTLE
DRESSING REGULAR UPPER BODY CLOTHING: A LOT

## 2024-03-08 ASSESSMENT — PAIN SCALES - PAIN ASSESSMENT IN ADVANCED DEMENTIA (PAINAD)
CONSOLABILITY: DISTRACTED OR REASSURED BY VOICE/TOUCH
NEGVOCALIZATION: OCCASIONAL MOAN/GROAN, LOW SPEECH, NEGATIVE/DISAPPROVING QUALITY
BREATHING: NORMAL
FACIALEXPRESSION: SMILING OR INEXPRESSIVE
BODYLANGUAGE: TENSE, DISTRESSED PACING, FIDGETING
FACIALEXPRESSION: FACIAL GRIMACING
TOTALSCORE: 1
CONSOLABILITY: NO NEED TO CONSOLE
BREATHING: NORMAL
TOTALSCORE: 5
BODYLANGUAGE: TENSE, DISTRESSED PACING, FIDGETING

## 2024-03-08 ASSESSMENT — PAIN SCALES - WONG BAKER
WONGBAKER_NUMERICALRESPONSE: NO HURT
WONGBAKER_NUMERICALRESPONSE: NO HURT
WONGBAKER_NUMERICALRESPONSE: HURTS WHOLE LOT
WONGBAKER_NUMERICALRESPONSE: HURTS LITTLE MORE

## 2024-03-08 ASSESSMENT — PAIN DESCRIPTION - LOCATION
LOCATION: RIB CAGE
LOCATION: RIB CAGE
LOCATION: CHEST

## 2024-03-08 ASSESSMENT — MONTREAL COGNITIVE ASSESSMENT (MOCA)
12. MEMORY INDEX SCORE: 1
10. [FLUENCY] NAME WORDS STARTING WITH DESIGNATED LETTER: 0
6. READ LIST OF DIGITS [FORWARD/BACKWARD]: 1
5. MEMORY TRIALS: 0
13. ORIENTATION SUBSCORE: 2
9. REPEAT EACH SENTENCE: 1
11. FOR EACH PAIR OF WORDS, WHAT CATEGORY DO THEY BELONG TO (OUT OF 2): 2
8. SERIAL SUBTRACTION OF 7S: 2
4. NAME EACH OF THE THREE ANIMALS SHOWN: 3
7. [VIGILENCE] TAP WHEN HEARING DESIGNATED LETTER: 0
WHAT LEVEL OF EDUCATION WAS ATTAINED: 0

## 2024-03-08 ASSESSMENT — PAIN - FUNCTIONAL ASSESSMENT
PAIN_FUNCTIONAL_ASSESSMENT: 0-10

## 2024-03-08 ASSESSMENT — PAIN DESCRIPTION - DESCRIPTORS: DESCRIPTORS: ACHING;DISCOMFORT

## 2024-03-08 NOTE — PROGRESS NOTES
Occupational Therapy     REHAB Therapy Assessment & Treatment    Patient Name: Donald Mcknight  MRN: [id number]  Today's Date: 3/8/2024      Activity Assessment:   Expressions All About Me/Self Understanding Group: [phone]  Self Esteem/Personal Strengths/Social Skills Group: [id number]  Movement/ Leisure Skills Group: [id number]  Mindfulness Art and Relaxation Group: [id number]    0/4 Groups attended     Pt remains in his room during all groups with no participation again this date despite encouragement. Pt observed out of his room for meals only. Pt would benefit from continued OT services in order to improve overall self-esteem, personal confidence and supports with increased awareness for safe transition location at discharge.          Encounter Problems                       OT Goals       Pt will demo ADL routine and meaningful daily activities with Marina using modifications as needed  (Progressing)       Start:  03/05/24    Expected End:  04/02/24            Patient will attend to therapeutic task for 10 minutes with minimum verbal cues demonstrating improved attention and participation in daily activities.  (Progressing)       Start:  03/05/24    Expected End:  04/02/24            Pt will demo functional transfers to/ from EOB, chair and commode with Marina and LRD (Progressing)       Start:  03/05/24    Expected End:  04/02/24            Pt will ID/ utilize 1-2 ways to increase balance of activity/ re-involve self in functional daily routines/roles prior to discharge.   (Progressing)       Start:  03/05/24    Expected End:  04/02/24            Pt will explore and ID 1-2 strategies to manage stressors/symptoms of illness/ grief more effectively prior to discharge.   (Progressing)       Start:  03/05/24    Expected End:  04/02/24                             Additional Comments:  NHI collaborated with patients nurse and charge nurse throughout the day to provide the appropriate support and encouragement to attend  groups. Pt up on unit when HANKINS left last group of the day. All needs met.

## 2024-03-08 NOTE — NURSING NOTE
Patient has stayed in his room this shift refusing to come out to the front lounge in a Broda chair and he was educated on the importance of moving his body and legs.  Patient was assisted up and into a chair at bedside, requiring three heavy assist.  Patient was in the chair for one hour and insisted on getting back in bed.  Med compliant, cooperative with care and pleasant.

## 2024-03-08 NOTE — NURSING NOTE
"Patient on Bipap with 2L 02 bleed in at start of shift. Had melatonin night prior with no positive effects. Pt friendly and cooperative. Up OOB in broda chair to meals and visitation with wife. C/o pain to left shoulder down through abdomen 9/10 that seems to be positional. Pt reports 9/10 anxiety and depression, denies SI/HI and VAT hallucinations. States dogs \"evan and lanette\" are coping skills and a personal goal of \"sitting up to evening meal and watching T.V.\" MoCA completed today with a score of 14.   "

## 2024-03-08 NOTE — CARE PLAN
"The patient's goals for this shift include:     The clinical goals for this shift include: Treatment Compliance and Safety  -Patient will participate in ordered treatments and accept the need for medications.  -The patient will demonstrate effective coping when faced with unfavorable situations.  -The patient will verbalize an increased sense of self-worth.  -The patient will maintain reality orientation and communicate and interact with other people according to social norms.  On assessment, the patient is restless, but answers questions appropriately. The patient is lying in bed. Patient rates anxiety 7/10, depression 10/10, pain 5/10, but otherwise denies SI/HI, and AH/VH at this time. Patient lists \"painting in my garage\" as a coping skill, \"sports\" as a strength, and \"get home and paint\" as a goal. Compliant with nighttime medications. PRN melatonin and tylenol given.     Update: Care provided as ordered without issue. One time ibuprofen given as patient stated that tylenol was not working. Multiple episodes of urine and BM incontinence. Minimal sleep, patient awake most of the shift but calmly resting in bed. CPAP worn.  Q15 minute safety checks maintained.     "

## 2024-03-08 NOTE — PROGRESS NOTES
"Donald Mcknight is a 73 y.o. year old male patient who is on U admission day 5.      Subjective   Donald Mcknight is a 73 y.o. year old male patient who was personally seen and interviewed, and discussed in morning team rounds. The patient was interviewed alone in his room (interviewed laying in bed, awake and alert), and was easily engaged and cooperative. This morning, Donald reports feeling \"terrible\" and currently rates his depression at a 9 out of 10. No current suicidal ideation or plans were elicited. He also rates his anxiety at a 10 out of 10. No overt hallucinations or paranoia were endorsed or noted.   Donald slept 1 hour last night (broken).    Donald currently is complaining of heartburn.      Objective   Mental Status Exam:   General: Appropriately groomed and dressed in some hospital attire, laying in bed, awake and alert.   Appearance: Appears stated age.   Attitude: Calm, cooperative.   Behavior: Appropriate eye contact.   Motor Activity: No agitation or retardation. No EPS/TD. Impaired gait and station. Normal muscle tone and bulk.   Speech: Regular rate, rhythm, volume and tone, spontaneous, fluent. Non-pressured. Occasionally poor articulation.   Mood: \"Terrible\"   Affect: Neutral.   Thought Process: Organized, and goal directed.   Thought Content: Does not currently endorse suicidal ideation or any suicide plans.   Does not endorse homicidal ideation.  No overt delusions or paranoia elicited.    Thought Perception: Does not endorse auditory or visual hallucinations (see HPI).   Cognition: Alert, oriented x 3 currently. Adequate fund of knowledge. Possibly some deficit in recent and remote memory. No overt deficits in attention, concentration or language.  MoCA (03-) = 14 out of 30 (Moderate Cognitive Impairment).   Insight: Poor-to-fair, as patient recognizes symptoms of  illness and need for recommended treatments.    Judgment: Poor-to-fair, as patient can make reasonable decisions " about ordinary activities of daily living and necessary medical care recommendations.       LABS:  No results found for this or any previous visit (from the past 24 hour(s)).       Last Recorded Vitals  Visit Vitals  BP (!) 151/96   Pulse 80   Temp 36.2 °C (97.2 °F) (Temporal)   Resp 18        Intake/Output last 3 Shifts:  No intake/output data recorded.    Relevant Results  Scheduled medications  aspirin, 81 mg, oral, Daily  atorvastatin, 80 mg, oral, Nightly  calcium carbonate, 1,000 mg, oral, Daily  carvedilol, 12.5 mg, oral, BID  divalproex, 125 mg, oral, BID  famotidine, 20 mg, oral, Daily  FLUoxetine, 20 mg, oral, Daily  lidocaine, 1 patch, transdermal, Daily  lisinopril, 10 mg, oral, Daily  [START ON 3/9/2024] methylPREDNISolone, 12 mg, oral, Once   Followed by  [START ON 3/10/2024] methylPREDNISolone, 8 mg, oral, Once   Followed by  [START ON 3/11/2024] methylPREDNISolone, 4 mg, oral, Once  nystatin, , Topical, BID  pantoprazole, 40 mg, oral, Daily before breakfast  perflutren lipid microspheres, 0.5-10 mL of dilution, intravenous, Once in imaging      Continuous medications     PRN medications  PRN medications: acetaminophen, albuterol, alum-mag hydroxide-simeth, hydrOXYzine pamoate, melatonin, OLANZapine **OR** OLANZapine, oxygen, psyllium               Assessment/Plan   1) Other Specified Depressive Disorder       Plan: 1) Fluoxetine 20 mg Qdaily (continue)               *2) trial Mirtazapine 7.5 mg at bedtime (sleep)     Discussed potential risks, benefits, and alternatives to medications with patient, who consented to the above medications.     2) Major Neurocognitive Disorder       Plan: 1) MoCA (03-) = 14 out of 30 (Moderate Cognitive Impairment).    3) Delirium, acute - resolved       Plan: 1) Discontinue trial Depakote 250 -> 125 > 0.0 mg Q17:00 and Q21:00     4) HTN       Plan: 1) IM service to follow and manage.     5) COPD       Plan: 1) IM service to follow and manage.     6) ARTEMIO        Plan: 1) IM service to follow and manage.     7) Diverticulitis       Plan: 1) IM service to follow and manage.     8) GERD       Plan: 1) IM service to follow and manage.    9) Right hip pain       Plan: 1) IM service to follow and manage.            Palomo Tiwari MD

## 2024-03-08 NOTE — CARE PLAN
Discussed in Treatment team today;  stabilizing; possible discharge date on Monday;  per nursing, having a lot of pain complaints;  Discharge plan: to go home with 24/7 care provided by wife and daughter with follow up with Elsy ROSS. Sw to follow.

## 2024-03-08 NOTE — NURSING NOTE
"Patients wife visited patient today and states she has \" something to talk to this nurse about\". Patient's wife states the patient had taken synthetic gummys during the month of January and has concerns that this has has an affect on his cognition.  "

## 2024-03-08 NOTE — PROGRESS NOTES
"Occupational Therapy    OT Treatment    Patient Name: Donald Mcknight  MRN: [id number]  Today's Date: 3/8/2024  Time Calculation  Start Time: 1002  Stop Time: 1017  Time Calculation (min): 15 min    Plan:  Treatment Interventions: ADL retraining, Functional transfer training, Cognitive reorientation, UE strengthening/ROM, Compensatory technique education  OT Frequency: 3 times per week  OT Discharge Recommendations: Moderate intensity level of continued care  OT - OK to Discharge: Yes  Treatment Interventions: ADL retraining, Functional transfer training, Cognitive reorientation, UE strengthening/ROM, Compensatory technique education    Subjective   Previous Visit Info:  OT Last Visit  OT Received On: 03/08/24    General:  General  Reason for Referral: MoCA administration  Prior to Session Communication: Bedside nurse  Patient Position Received: Bed, 3 rail up, Alarm on  General Comment: Pt side lying and initially not agreeable to participation after being woken up. With encouragement, pt participate in assessment with the exception of the Visuospatial/Executive subsection. Pt reports \"I can't read any of that\". Total score converted for accuracy.    Pain: Denies pain       Objective      Bed Mobility/Transfers: Bed Mobility  Bed Mobility:  (Pt refused)    Outcome Measures:[facility] Daily Activity  Putting on and taking off regular lower body clothing: A lot  Bathing (including washing, rinsing, drying): A lot  Putting on and taking off regular upper body clothing: A lot  Toileting, which includes using toilet, bedpan or urinal: A lot  Taking care of personal grooming such as brushing teeth: A little  Eating Meals: A little  Daily Activity - Total Score: 14        MoCA  V/E: x not completed, score converted for accuracy  Naming: 3  Memory (Score '0' as this is an Unscored Section): 0  Attention: Read List of Digits: 1  Attention: Read List of Letters: 0  Attention: Serial Sevens: 2  Language: Repeat: 1  Language: " Fluency: 0  Abstraction: 2  Delayed Recall: 1  Orientation: 2; MIS 9/15  Add 1 Point if </=12 yr Education: 0  Converted Total Score: 14/30, indicating Moderate Cognitive Impairment    Education Documentation  Body Mechanics, taught by Jessica Ayala OT at 3/8/2024 10:36 AM.  Learner: Patient  Readiness: Acceptance  Method: Explanation  Response: Needs Reinforcement    Goals:  Encounter Problems            OT Goals       Pt will demo ADL routine and meaningful daily activities with Marina using modifications as needed  (Progressing)       Start:  03/05/24    Expected End:  04/02/24            Patient will attend to therapeutic task for 10 minutes with minimum verbal cues demonstrating improved attention and participation in daily activities.  (Progressing)       Start:  03/05/24    Expected End:  04/02/24            Pt will demo functional transfers to/ from EOB, chair and commode with Marina and LRD (Progressing)       Start:  03/05/24    Expected End:  04/02/24            Pt will ID/ utilize 1-2 ways to increase balance of activity/ re-involve self in functional daily routines/roles prior to discharge.   (Progressing)       Start:  03/05/24    Expected End:  04/02/24            Pt will explore and ID 1-2 strategies to manage stressors/symptoms of illness/ grief more effectively prior to discharge.   (Progressing)       Start:  03/05/24    Expected End:  04/02/24

## 2024-03-09 ENCOUNTER — APPOINTMENT (OUTPATIENT)
Dept: RADIOLOGY | Facility: HOSPITAL | Age: 74
End: 2024-03-09
Payer: OTHER GOVERNMENT

## 2024-03-09 PROCEDURE — 76705 ECHO EXAM OF ABDOMEN: CPT | Performed by: RADIOLOGY

## 2024-03-09 PROCEDURE — 2500000001 HC RX 250 WO HCPCS SELF ADMINISTERED DRUGS (ALT 637 FOR MEDICARE OP): Performed by: NURSE PRACTITIONER

## 2024-03-09 PROCEDURE — 1240000001 HC SEMI-PRIVATE BH ROOM DAILY

## 2024-03-09 PROCEDURE — 76705 ECHO EXAM OF ABDOMEN: CPT

## 2024-03-09 PROCEDURE — 2500000001 HC RX 250 WO HCPCS SELF ADMINISTERED DRUGS (ALT 637 FOR MEDICARE OP): Performed by: PSYCHIATRY & NEUROLOGY

## 2024-03-09 PROCEDURE — 99232 SBSQ HOSP IP/OBS MODERATE 35: CPT | Performed by: PSYCHIATRY & NEUROLOGY

## 2024-03-09 PROCEDURE — 2500000005 HC RX 250 GENERAL PHARMACY W/O HCPCS: Performed by: NURSE PRACTITIONER

## 2024-03-09 PROCEDURE — 2500000004 HC RX 250 GENERAL PHARMACY W/ HCPCS (ALT 636 FOR OP/ED): Performed by: NURSE PRACTITIONER

## 2024-03-09 PROCEDURE — 2500000002 HC RX 250 W HCPCS SELF ADMINISTERED DRUGS (ALT 637 FOR MEDICARE OP, ALT 636 FOR OP/ED): Performed by: NURSE PRACTITIONER

## 2024-03-09 PROCEDURE — 99231 SBSQ HOSP IP/OBS SF/LOW 25: CPT | Performed by: NURSE PRACTITIONER

## 2024-03-09 RX ORDER — POTASSIUM CHLORIDE 20 MEQ/1
40 TABLET, EXTENDED RELEASE ORAL ONCE
Status: COMPLETED | OUTPATIENT
Start: 2024-03-09 | End: 2024-03-09

## 2024-03-09 RX ADMIN — PANTOPRAZOLE SODIUM 40 MG: 40 TABLET, DELAYED RELEASE ORAL at 06:25

## 2024-03-09 RX ADMIN — METHYLPREDNISOLONE 12 MG: 4 TABLET ORAL at 10:45

## 2024-03-09 RX ADMIN — CARVEDILOL 12.5 MG: 12.5 TABLET, FILM COATED ORAL at 20:33

## 2024-03-09 RX ADMIN — CALCIUM CARBONATE (ANTACID) CHEW TAB 500 MG 1000 MG: 500 CHEW TAB at 10:45

## 2024-03-09 RX ADMIN — FLUOXETINE HYDROCHLORIDE 20 MG: 20 CAPSULE ORAL at 10:46

## 2024-03-09 RX ADMIN — KETOROLAC TROMETHAMINE 10 MG: 10 TABLET, FILM COATED ORAL at 20:30

## 2024-03-09 RX ADMIN — Medication 5 MG: at 20:33

## 2024-03-09 RX ADMIN — ACETAMINOPHEN 650 MG: 325 TABLET ORAL at 18:41

## 2024-03-09 RX ADMIN — KETOROLAC TROMETHAMINE 10 MG: 10 TABLET, FILM COATED ORAL at 00:06

## 2024-03-09 RX ADMIN — CARVEDILOL 12.5 MG: 12.5 TABLET, FILM COATED ORAL at 10:46

## 2024-03-09 RX ADMIN — LIDOCAINE 1 PATCH: 4 PATCH TOPICAL at 10:46

## 2024-03-09 RX ADMIN — LISINOPRIL 10 MG: 5 TABLET ORAL at 10:45

## 2024-03-09 RX ADMIN — ATORVASTATIN CALCIUM 80 MG: 80 TABLET, FILM COATED ORAL at 20:33

## 2024-03-09 RX ADMIN — ASPIRIN 81 MG: 81 TABLET, COATED ORAL at 10:45

## 2024-03-09 RX ADMIN — DIVALPROEX SODIUM 125 MG: 125 TABLET, DELAYED RELEASE ORAL at 17:28

## 2024-03-09 RX ADMIN — POTASSIUM CHLORIDE 40 MEQ: 1500 TABLET, EXTENDED RELEASE ORAL at 15:53

## 2024-03-09 RX ADMIN — MIRTAZAPINE 7.5 MG: 15 TABLET, FILM COATED ORAL at 20:33

## 2024-03-09 RX ADMIN — NYSTATIN: 100000 OINTMENT TOPICAL at 10:46

## 2024-03-09 RX ADMIN — DIVALPROEX SODIUM 125 MG: 125 TABLET, DELAYED RELEASE ORAL at 06:29

## 2024-03-09 RX ADMIN — KETOROLAC TROMETHAMINE 10 MG: 10 TABLET, FILM COATED ORAL at 06:25

## 2024-03-09 RX ADMIN — FAMOTIDINE 20 MG: 20 TABLET ORAL at 10:45

## 2024-03-09 ASSESSMENT — PAIN - FUNCTIONAL ASSESSMENT
PAIN_FUNCTIONAL_ASSESSMENT: 0-10

## 2024-03-09 ASSESSMENT — PAIN SCALES - GENERAL
PAINLEVEL_OUTOF10: 4
PAINLEVEL_OUTOF10: 7
PAINLEVEL_OUTOF10: 9
PAINLEVEL_OUTOF10: 5 - MODERATE PAIN

## 2024-03-09 ASSESSMENT — PAIN SCALES - WONG BAKER
WONGBAKER_NUMERICALRESPONSE: HURTS EVEN MORE
WONGBAKER_NUMERICALRESPONSE: HURTS EVEN MORE

## 2024-03-09 ASSESSMENT — PAIN DESCRIPTION - LOCATION: LOCATION: SHOULDER

## 2024-03-09 NOTE — PROGRESS NOTES
"Donald Mcknight is a 73 y.o. male on day 6 of admission presenting with Depression with suicidal ideation.      Subjective   Donald Mcknight is a 73 y.o. year old male patient who was personally seen and interviewed, and discussed in morning team rounds. The patient was interviewed alone in his room (interviewed laying in bed, awake with eyes closed), and was passively engaged and cooperative. This morning, Donald reports feeling \"tired\" “sleepy”. He slept 4 hours UB last night improved from 1 hour night before after Remeron 7.5mg qhs added last night 3/8/24. PT still feels sleepy at 11am.  He reports his depression as “little bid” anxiety “little bid”, pain “little bid”. No overt hallucinations or paranoia were endorsed or noted.   Pt is compliant with medications, patient denied drug side effects. Will continue to monitor        Objective   Mental Status Exam:   General: Appropriately groomed and dressed in some hospital attire, laying in bed, awake.   Appearance: Appears stated age.   Attitude: Calm, cooperative.   Behavior: limited eye contact.   Motor Activity: No agitation or retardation. No EPS/TD. Impaired gait and station. Normal muscle tone and bulk.   Speech: Regular rate, rhythm, volume and tone, spontaneous, fluent. Non-pressured. Occasionally poor articulation.   Mood: \"tired\"   Affect: Neutral.   Thought Process: Organized, and goal directed.   Thought Content: Does not currently endorse suicidal ideation or any suicide plans.   Does not endorse homicidal ideation.  No overt delusions or paranoia elicited.    Thought Perception: Does not endorse auditory or visual hallucinations (see HPI).   Cognition: Alert, oriented x 3 currently. Adequate fund of knowledge. Possibly some deficit in recent and remote memory. No overt deficits in attention, concentration or language.  MoCA (03-) = 14 out of 30 (Moderate Cognitive Impairment).   Insight: Poor-to-fair, as patient recognizes symptoms of  illness " and need for recommended treatments.    Judgment: Poor-to-fair, as patient can make reasonable decisions about ordinary activities of daily living and necessary medical care recommendations.        LABS:  No results found for this or any previous visit (from the past 24 hour(s)).     Last Recorded Vitals  Visit Vitals  /89 (BP Location: Right arm, Patient Position: Lying)   Pulse 78   Temp 36.9 °C (98.4 °F) (Temporal)   Resp 20        Intake/Output last 3 Shifts:  No intake/output data recorded.    Relevant Results  Scheduled medications  aspirin, 81 mg, oral, Daily  atorvastatin, 80 mg, oral, Nightly  calcium carbonate, 1,000 mg, oral, Daily  carvedilol, 12.5 mg, oral, BID  divalproex, 125 mg, oral, BID  famotidine, 20 mg, oral, Daily  FLUoxetine, 20 mg, oral, Daily  lidocaine, 1 patch, transdermal, Daily  lisinopril, 10 mg, oral, Daily  [START ON 3/10/2024] methylPREDNISolone, 8 mg, oral, Once   Followed by  [START ON 3/11/2024] methylPREDNISolone, 4 mg, oral, Once  mirtazapine, 7.5 mg, oral, Nightly  nystatin, , Topical, BID  pantoprazole, 40 mg, oral, Daily before breakfast  perflutren lipid microspheres, 0.5-10 mL of dilution, intravenous, Once in imaging      Continuous medications     PRN medications  PRN medications: acetaminophen, albuterol, alum-mag hydroxide-simeth, hydrOXYzine pamoate, ketorolac, melatonin, OLANZapine **OR** OLANZapine, oxygen, psyllium         Assessment/Plan   1) Other Specified Depressive Disorder       Plan: 1) Fluoxetine 20 mg Qdaily (continue)               *2) trial Mirtazapine 7.5 mg at bedtime (sleep) 3/8/24, some improved sleep 4 hours from 1 hour night before. Still sleepy at 11am today.      Discussed potential risks, benefits, and alternatives to medications with patient, who consented to the above medications.     2) Major Neurocognitive Disorder       Plan: 1) MoCA (03-) = 14 out of 30 (Moderate Cognitive Impairment).     3) Delirium, acute - resolved        Plan: 1) Discontinue trial Depakote 250 -> 125 > 0.0 mg Q17:00 and Q21:00     4) HTN       Plan: 1) IM service to follow and manage.     5) COPD       Plan: 1) IM service to follow and manage.     6) ARTEMIO       Plan: 1) IM service to follow and manage.     7) Diverticulitis       Plan: 1) IM service to follow and manage.     8) GERD       Plan: 1) IM service to follow and manage.     9) Right hip pain       Plan: 1) IM service to follow and manage.            I spent 30 minutes in the professional and overall care of this patient.      Judson Funes MD PhD

## 2024-03-09 NOTE — GROUP NOTE
Group Topic: Chemical Dependency - Relapse   Group Date: 3/9/2024  Start Time: 1600  End Time: 1650  Facilitators: TAN Dos Santos   Department: [facility] REHAB THERAPY VIRTUAL    Number of Participants: 7   Group Focus: chemical dependency education, dual diagnosis, personal responsibility, relapse prevention, and substance abuse education  Treatment Modality: Other: Recreational Therapy   Interventions utilized were patient education and support  Purpose: coping skills, insight or knowledge, self-care, relapse prevention strategies, and trigger / craving management    Name: Donald Mcknight YOB: 1950   MR: [id number]      Facilitator: Recreational Therapist  Level of Participation: did not attend  Progress: None  Comments: Patient declined invitation to group activity at this time. Patient will continue to be provided with opportunities to enhance leisure skills and/or coping mechanisms.  Plan: continue with services

## 2024-03-09 NOTE — PROGRESS NOTES
Herminio Mcknight is a 73 y.o. male on day 6 of admission presenting with Depression with suicidal ideation.      Subjective   Still c/o epigastric pain and says the only thing that helps is toradol.  +Chapin's sign on exam.     Objective     Last Recorded Vitals  BP (!) 166/106 (BP Location: Left arm, Patient Position: Sitting)   Pulse 81   Temp 36.9 °C (98.4 °F) (Tympanic)   Resp 20   Wt 117 kg (257 lb 15 oz)   SpO2 94%   Intake/Output last 3 Shifts:  No intake or output data in the 24 hours ending 03/09/24 0032    Admission Weight  Weight: 117 kg (257 lb 15 oz) (03/04/24 0700)    Daily Weight  03/04/24 : 117 kg (257 lb 15 oz)    Image Results  Transthoracic Echo (TTE) Complete     [facility], [address] [place] [address]                Tel [phone] and Fax [phone]    TRANSTHORACIC ECHOCARDIOGRAM REPORT       Patient Name:      HERMINIO MCKNIGHT      Reading Physician:    [id number] Bravo Martin MD  Study Date:        3/5/2024             Ordering Provider:    [id number] CHARIS NORTH  MRN/PID:           [id number]             Fellow:  Accession#:        [id number]         Nurse:  Date of Birth/Age: 1950 / 73 years Sonographer:          Mellisa Parrish RDCS  Gender:            M                    Additional Staff:  Height:            182.88 cm            Admit Date:           3/3/2024  Weight:            116.58 kg            Admission Status:     Inpatient -                                                                Routine  BSA / BMI:         2.37 m2 / 34.86      Encounter#:           [id number]                     kg/m2                                          Department Location:  [facility] Non                                                                Invasive    Study Type:    TRANSTHORACIC ECHO (TTE) COMPLETE  Diagnosis/ICD: Chest pain,  unspecified-R07.9  Indication:    CP  CPT Code:      Echo Complete w Full Doppler-70971    Patient History:  Pertinent History: COPD, HTN, Hyperlipidemia and Chest Pain.    Study Detail: The following Echo studies were performed: 2D, M-Mode, Doppler and                color flow. Technically challenging study due to body habitus and                patient lying in supine position. Unable to obtain LA view. The                patient was awake.       PHYSICIAN INTERPRETATION:  Left Ventricle: The left ventricular systolic function is normal, with an estimated ejection fraction of 60-65%. There are no regional wall motion abnormalities. The left ventricular cavity size is normal. Spectral Doppler shows an impaired relaxation pattern of left ventricular diastolic filling.  Left Atrium: The left atrium was not well visualized.  Right Ventricle: The right ventricle is normal in size. There is normal right ventricular global systolic function.  Right Atrium: The right atrium was not well visualized.  Aortic Valve: The aortic valve was not well visualized. There is no evidence of aortic valve stenosis.  There is no evidence of aortic valve regurgitation. The peak instantaneous gradient of the aortic valve is 6.6 mmHg. The mean gradient of the aortic valve is 3.0 mmHg.  Mitral Valve: The mitral valve is normal in structure. There is trace mitral valve regurgitation.  Tricuspid Valve: The tricuspid valve is structurally normal. There is trace tricuspid regurgitation. The right ventricular systolic pressure is unable to be estimated.  Pulmonic Valve: The pulmonic valve is not well visualized. The pulmonic valve regurgitation was not well visualized.  Pericardium: There is no pericardial effusion noted.  Aorta: The aortic root is normal.  Pulmonary Artery: The pulmonary artery is not well visualized.  Systemic Veins: The inferior vena cava was not well visualized.       CONCLUSIONS:   1. Left ventricular systolic function is  normal with a 60-65% estimated ejection fraction.   2. Poorly visualized anatomical structures due to suboptimal image quality.   3. Spectral Doppler shows an impaired relaxation pattern of left ventricular diastolic filling.    QUANTITATIVE DATA SUMMARY:  2D MEASUREMENTS:                           Normal Ranges:  IVSd:          1.68 cm   (0.6-1.1cm)  LVPWd:         0.95 cm   (0.6-1.1cm)  LVIDd:         4.10 cm   (3.9-5.9cm)  LVIDs:         3.41 cm  LV Mass Index: 83.1 g/m2  LV % FS        16.8 %    AORTA MEASUREMENTS:                     Normal Ranges:  Asc Ao, d: 2.90 cm (2.1-3.4cm)    LV SYSTOLIC FUNCTION BY 2D PLANIMETRY (MOD):                      Normal Ranges:  EF-A4C View: 52.3 % (>=55%)  EF-A2C View: 46.6 %  EF-Biplane:  52.3 %    LV DIASTOLIC FUNCTION:                         Normal Ranges:  MV Peak E:    0.53 m/s (0.7-1.2 m/s)  MV Peak A:    0.64 m/s (0.42-0.7 m/s)  E/A Ratio:    0.83     (1.0-2.2)  MV e'         0.04 m/s (>8.0)  MV lateral e' 0.07 m/s  MV medial e'  0.04 m/s  E/e' Ratio:   13.28    (<8.0)    MITRAL VALVE:                  Normal Ranges:  MV DT: 325 msec (150-240msec)    AORTIC VALVE:                                    Normal Ranges:  AoV Vmax:                1.28 m/s (<=1.7m/s)  AoV Peak P.6 mmHg (<20mmHg)  AoV Mean PG:             3.0 mmHg (1.7-11.5mmHg)  LVOT Max Lenin:            1.14 m/s (<=1.1m/s)  AoV VTI:                 16.50 cm (18-25cm)  LVOT VTI:                17.00 cm  LVOT Diameter:           2.30 cm  (1.8-2.4cm)  AoV Area, VTI:           4.28 cm2 (2.5-5.5cm2)  AoV Area,Vmax:           3.70 cm2 (2.5-4.5cm2)  AoV Dimensionless Index: 1.03       RIGHT VENTRICLE:  RV Basal 2.23 cm  RV Mid   1.22 cm  RV Major 8.8 cm  TAPSE:   15.3 mm  RV s'    0.06 m/s    TRICUSPID VALVE/RVSP:                             Normal Ranges:  Peak TR Velocity: 0.74 m/s  RV Syst Pressure: 5.2 mmHg (< 30mmHg)    PULMONIC VALVE:                       Normal Ranges:  PV Max Lenin: 0.8 m/s   (0.6-0.9m/s)  PV Max P.3 mmHg       [id number] Bravo Martin MD  Electronically signed on 3/5/2024 at 4:35:15 PM       ** Final **      Physical Exam  Constitutional:       Appearance: Normal appearance. He is obese.   HENT:      Head: Normocephalic and atraumatic.      Mouth/Throat:      Mouth: Mucous membranes are moist.      Pharynx: Oropharynx is clear. No oropharyngeal exudate or posterior oropharyngeal erythema.   Neck: No JVD or use of accessory muscles.  Eyes:      Extraocular Movements: Extraocular movements intact.      Conjunctiva/sclera: Conjunctivae normal.   Cardiovascular:      Rate and Rhythm: Normal rate and regular rhythm. Chest pain not reproducible.     Pulses: Normal pulses.      Heart sounds: Normal heart sounds. No murmur heard.  Pulmonary:      Effort: No respiratory distress. On 2-3 L NC.     Breath sounds: No wheezing or rhonchi. No tachypnea & labored breathing, No cyanosis.  Abdominal:      General: Bowel sounds are normal. There is no distension. Rectal nandini intact.     Palpations: Abdomen is soft. There is no mass.      Tenderness:  + Chapin's sign. There is no guarding or rebound.   Extremities: No swelling and palpable distal pulses.   Musculoskeletal:         General: No swelling or tenderness.      Comments: No focal weakness   Neurological:      Mental Status: He is alert and oriented to person, place, and time.      Cranial Nerves: No cranial nerve deficit.      Sensory: No sensory deficit.      Motor: No weakness.   Psychiatric:         Mood and Affect: Mood normal.         Behavior: Behavior normal.      Relevant Results  Scheduled medications  aspirin, 81 mg, oral, Daily  atorvastatin, 80 mg, oral, Nightly  calcium carbonate, 1,000 mg, oral, Daily  carvedilol, 12.5 mg, oral, BID  divalproex, 125 mg, oral, BID  famotidine, 20 mg, oral, Daily  FLUoxetine, 20 mg, oral, Daily  lidocaine, 1 patch, transdermal, Daily  lisinopril, 10 mg, oral, Daily  methylPREDNISolone, 12 mg,  oral, Once   Followed by  [START ON 3/10/2024] methylPREDNISolone, 8 mg, oral, Once   Followed by  [START ON 3/11/2024] methylPREDNISolone, 4 mg, oral, Once  mirtazapine, 7.5 mg, oral, Nightly  nystatin, , Topical, BID  pantoprazole, 40 mg, oral, Daily before breakfast  perflutren lipid microspheres, 0.5-10 mL of dilution, intravenous, Once in imaging      Continuous medications     PRN medications  PRN medications: acetaminophen, albuterol, alum-mag hydroxide-simeth, hydrOXYzine pamoate, ketorolac, melatonin, OLANZapine **OR** OLANZapine, oxygen, psyllium    No results found.  No results found for this or any previous visit (from the past 24 hour(s)).      Assessment/Plan   Epigastric Pain with + Chapin's Sign  RUQ US and npo-F/U; IF +, consult surgery  Toradol prn for pain    Teresa Myers, APRN-CNP

## 2024-03-09 NOTE — GROUP NOTE
Group Topic: Cognitive Focus   Group Date: 3/9/2024  Start Time: 1115  End Time: 1155  Facilitators: TAN Dos Santos   Department: [facility] REHAB THERAPY VIRTUAL    Number of Participants: 7   Group Focus: concentration, coping skills, and leisure skills  Treatment Modality: Other: Recreational Therapy   Interventions utilized were leisure development and mental fitness  Purpose: other: leisure awareness, healthy competition, social engagement    Name: Donald Mcknight YOB: 1950   MR: [id number]      Facilitator: Recreational Therapist  Level of Participation: did not attend  Progress: None  Comments: Patient declined invitation to group activity at this time. Patient will continue to be provided with opportunities to enhance leisure skills and/or coping mechanisms.  Plan: continue with services

## 2024-03-09 NOTE — CARE PLAN
"The patient's goals for the shift include \"Get some rest\"    The clinical goals for the shift include Medication and treatment compliance and for patient to remain free from injury    Donald has slept at intervals during the night.  "

## 2024-03-09 NOTE — CARE PLAN
"  Problem: Sensory Perceptual Alteration as Evidenced by  Goal: Cooperates with admission process  Outcome: Progressing  Goal: Patient/Family participate in treatment and discharge plans  Outcome: Progressing  Goal: Patient/Family verbalizes awareness of resources  Outcome: Progressing  Goal: Participates in unit activities  Outcome: Progressing  Goal: Discusses signs/symptoms of illness/treatment options  Outcome: Progressing  Goal: Initiates reality-based interactions  Outcome: Progressing  Goal: Able to discuss content of hallucinations/delusions  Outcome: Progressing  Goal: Notifies staff when experiencing hallucinations/delusions  Outcome: Progressing  Goal: Verbalizes reduction in hallucinations/delusions  Outcome: Progressing  Goal: Will not act on psychotic perception  Outcome: Progressing  Goal: Understands least restrictive measures  Outcome: Progressing  Goal: Free from restraint events  Outcome: Progressing     Problem: Altered Thought Processes as Evidenced by  Goal: STG - Desires improvement in ability to think and concentrate  Outcome: Progressing  Goal: STG - Participates in Occupational Therapy and other cognitive assessments  Outcome: Progressing     Problem: Potential for Harm to Self or Others  Goal: Cooperates with admission process  Outcome: Progressing  Goal: Participates in unit activities  Outcome: Progressing  Goal: Patient/Family participate in treatment and discharge plans  Outcome: Progressing  Goal: Identifies deescalation techniques  Outcome: Progressing  Goal: Understands least restrictive measures  Outcome: Progressing  Goal: Identifies stressors that lead to harmful behaviors  Outcome: Progressing  Goal: Notifies staff when experiencing harmful thoughts toward self/others  Outcome: Progressing  Goal: Denies harm toward self or others  Outcome: Progressing  Goal: Free from restraint events  Outcome: Progressing   The patient's goals for the shift include \"Get some rest\"    The " clinical goals for the shift include Medication and treatment compliance and for patient to remain free from injury

## 2024-03-09 NOTE — GROUP NOTE
Group Topic: Gross Motor/Balance Skills   Group Date: 3/9/2024  Start Time: 1400  End Time: 1445  Facilitators: TAN Dos Santos   Department: [facility] REHAB THERAPY VIRTUAL    Number of Participants: 8   Group Focus: coping skills and leisure skills  Treatment Modality: Other: Recreational Therapy   Interventions utilized were exploration and leisure development  Purpose: coping skills and other: leisure awareness, healthy competition, social engagement     Name: Donald Mcknight YOB: 1950   MR: [id number]      Facilitator: Recreational Therapist  Level of Participation: did not attend  Progress: None  Comments: Patient declined invitation to group activity at this time. Patient will continue to be provided with opportunities to enhance leisure skills and/or coping mechanisms.  Plan: continue with services

## 2024-03-09 NOTE — NURSING NOTE
"Donald remained awake for the entire evening, there was a few yells, but he lay quietly with his eyes closed for awhile.  He started complaining of generalized body pain, so he did receive Acetaminophen 650 by mouth, along with Melatonin 5 mg and Hydroxyzine 25 mg, all of them by mouth.  He had two ice creams for a snack.  He complained of anxiety 10/10, depression 9/10, he denies having any SI/HI and says he occasionally hears his \"dad's voice, but that is not so much of a problem since he is not with us anymore.\"  I've changed him twice for incontinence of a large amount of urine.    0025 - Teresa Myers NP wrote for Donald to take Ketorolac 10 mg, by mouth for his continued complaint of generalized body aches and pain.  I called Respiratory to come back to reassemble his CPAP mask and tubing, he had it off and on the floor minutes after they started it a short while ago.    0213 - Donald has fallen asleep at brief intervals and seems less anxious in general.  We will continue to encourage rest/sleep.    0443 - Donald continues to sleep.  "

## 2024-03-10 LAB
ATRIAL RATE: 95 BPM
P AXIS: 66 DEGREES
P OFFSET: 200 MS
P ONSET: 134 MS
PR INTERVAL: 172 MS
Q ONSET: 220 MS
QRS COUNT: 15 BEATS
QRS DURATION: 84 MS
QT INTERVAL: 368 MS
QTC CALCULATION(BAZETT): 462 MS
QTC FREDERICIA: 428 MS
R AXIS: 22 DEGREES
T AXIS: 46 DEGREES
T OFFSET: 404 MS
VENTRICULAR RATE: 95 BPM

## 2024-03-10 PROCEDURE — 2500000001 HC RX 250 WO HCPCS SELF ADMINISTERED DRUGS (ALT 637 FOR MEDICARE OP): Performed by: NURSE PRACTITIONER

## 2024-03-10 PROCEDURE — 2500000002 HC RX 250 W HCPCS SELF ADMINISTERED DRUGS (ALT 637 FOR MEDICARE OP, ALT 636 FOR OP/ED): Performed by: PSYCHIATRY & NEUROLOGY

## 2024-03-10 PROCEDURE — 2500000001 HC RX 250 WO HCPCS SELF ADMINISTERED DRUGS (ALT 637 FOR MEDICARE OP): Performed by: PSYCHIATRY & NEUROLOGY

## 2024-03-10 PROCEDURE — 99232 SBSQ HOSP IP/OBS MODERATE 35: CPT | Performed by: PSYCHIATRY & NEUROLOGY

## 2024-03-10 PROCEDURE — 2500000005 HC RX 250 GENERAL PHARMACY W/O HCPCS: Performed by: NURSE PRACTITIONER

## 2024-03-10 PROCEDURE — 2500000004 HC RX 250 GENERAL PHARMACY W/ HCPCS (ALT 636 FOR OP/ED): Performed by: NURSE PRACTITIONER

## 2024-03-10 PROCEDURE — 1240000001 HC SEMI-PRIVATE BH ROOM DAILY

## 2024-03-10 RX ORDER — MIRTAZAPINE 15 MG/1
15 TABLET, FILM COATED ORAL NIGHTLY
Status: DISCONTINUED | OUTPATIENT
Start: 2024-03-10 | End: 2024-03-16

## 2024-03-10 RX ADMIN — NYSTATIN: 100000 OINTMENT TOPICAL at 08:03

## 2024-03-10 RX ADMIN — FAMOTIDINE 20 MG: 20 TABLET ORAL at 08:07

## 2024-03-10 RX ADMIN — ACETAMINOPHEN 650 MG: 325 TABLET ORAL at 08:02

## 2024-03-10 RX ADMIN — ALUMINUM HYDROXIDE, MAGNESIUM HYDROXIDE, AND DIMETHICONE 30 ML: 200; 20; 200 SUSPENSION ORAL at 05:50

## 2024-03-10 RX ADMIN — FLUOXETINE HYDROCHLORIDE 20 MG: 20 CAPSULE ORAL at 08:02

## 2024-03-10 RX ADMIN — CALCIUM CARBONATE (ANTACID) CHEW TAB 500 MG 1000 MG: 500 CHEW TAB at 08:02

## 2024-03-10 RX ADMIN — LISINOPRIL 10 MG: 5 TABLET ORAL at 08:02

## 2024-03-10 RX ADMIN — NYSTATIN: 100000 OINTMENT TOPICAL at 20:10

## 2024-03-10 RX ADMIN — KETOROLAC TROMETHAMINE 10 MG: 10 TABLET, FILM COATED ORAL at 06:00

## 2024-03-10 RX ADMIN — DIVALPROEX SODIUM 125 MG: 125 TABLET, DELAYED RELEASE ORAL at 17:19

## 2024-03-10 RX ADMIN — OLANZAPINE 5 MG: 5 TABLET, FILM COATED ORAL at 22:53

## 2024-03-10 RX ADMIN — CARVEDILOL 12.5 MG: 12.5 TABLET, FILM COATED ORAL at 08:02

## 2024-03-10 RX ADMIN — Medication 5 MG: at 20:11

## 2024-03-10 RX ADMIN — HYDROXYZINE PAMOATE 25 MG: 25 CAPSULE ORAL at 01:29

## 2024-03-10 RX ADMIN — MIRTAZAPINE 15 MG: 15 TABLET, FILM COATED ORAL at 20:11

## 2024-03-10 RX ADMIN — ATORVASTATIN CALCIUM 80 MG: 80 TABLET, FILM COATED ORAL at 20:11

## 2024-03-10 RX ADMIN — ACETAMINOPHEN 650 MG: 325 TABLET ORAL at 00:06

## 2024-03-10 RX ADMIN — HYDROXYZINE PAMOATE 25 MG: 25 CAPSULE ORAL at 21:49

## 2024-03-10 RX ADMIN — METHYLPREDNISOLONE 8 MG: 4 TABLET ORAL at 08:04

## 2024-03-10 RX ADMIN — DIVALPROEX SODIUM 125 MG: 125 TABLET, DELAYED RELEASE ORAL at 05:06

## 2024-03-10 RX ADMIN — LIDOCAINE 1 PATCH: 4 PATCH TOPICAL at 08:03

## 2024-03-10 RX ADMIN — PANTOPRAZOLE SODIUM 40 MG: 40 TABLET, DELAYED RELEASE ORAL at 06:00

## 2024-03-10 RX ADMIN — KETOROLAC TROMETHAMINE 10 MG: 10 TABLET, FILM COATED ORAL at 17:19

## 2024-03-10 RX ADMIN — CARVEDILOL 12.5 MG: 12.5 TABLET, FILM COATED ORAL at 20:11

## 2024-03-10 RX ADMIN — KETOROLAC TROMETHAMINE 10 MG: 10 TABLET, FILM COATED ORAL at 23:20

## 2024-03-10 RX ADMIN — ASPIRIN 81 MG: 81 TABLET, COATED ORAL at 08:02

## 2024-03-10 ASSESSMENT — PAIN - FUNCTIONAL ASSESSMENT
PAIN_FUNCTIONAL_ASSESSMENT: 0-10

## 2024-03-10 ASSESSMENT — PAIN SCALES - GENERAL
PAINLEVEL_OUTOF10: 7
PAINLEVEL_OUTOF10: 8
PAINLEVEL_OUTOF10: 7
PAINLEVEL_OUTOF10: 7
PAINLEVEL_OUTOF10: 8

## 2024-03-10 ASSESSMENT — PAIN DESCRIPTION - LOCATION: LOCATION: OTHER (COMMENT)

## 2024-03-10 ASSESSMENT — PAIN SCALES - PAIN ASSESSMENT IN ADVANCED DEMENTIA (PAINAD)
BODYLANGUAGE: RELAXED
CONSOLABILITY: NO NEED TO CONSOLE
FACIALEXPRESSION: SMILING OR INEXPRESSIVE
BREATHING: NORMAL
BREATHING: NORMAL
TOTALSCORE: 0

## 2024-03-10 ASSESSMENT — PAIN SCALES - WONG BAKER
WONGBAKER_NUMERICALRESPONSE: HURTS LITTLE BIT
WONGBAKER_NUMERICALRESPONSE: HURTS LITTLE BIT

## 2024-03-10 NOTE — CARE PLAN
"The patient's goals for the shift include \"sleep well\"    The clinical goals for the shift include medication compliance    Over the shift, the patient did not make progress toward the following goals. Barriers to progression include lack of medication knowledge. Recommendations to address these barriers include more education on medications.    "

## 2024-03-10 NOTE — GROUP NOTE
Group Topic: Art Creative   Group Date: 3/10/2024  Start Time: 1400  End Time: 1500  Facilitators: TAN Dos Santos   Department: [facility] REHAB THERAPY VIRTUAL    Number of Participants: 8   Group Focus: art therapy, concentration, and leisure skills  Treatment Modality: Other: Recreational Therapy   Interventions utilized were exploration and leisure development  Purpose: other: creative expression, leisure awareness, social engagement     Name: Donald Mcknight YOB: 1950   MR: [id number]      Facilitator: Recreational Therapist  Level of Participation: did not attend  Progress: None  Comments: Patient declined invitation to group activity at this time. Patient will continue to be provided with opportunities to enhance leisure skills and/or coping mechanisms.  Plan: continue with services

## 2024-03-10 NOTE — GROUP NOTE
Group Topic: Problem Solving   Group Date: 3/10/2024  Start Time: 1115  End Time: 1200  Facilitators: TAN Dos Santos   Department: [facility] REHAB THERAPY VIRTUAL    Number of Participants: 7   Group Focus: coping skills, leisure skills, problem solving, and social skills  Treatment Modality: Other: Recreational Therapy   Interventions utilized were exploration, leisure development, and problem solving  Purpose: other: leisure awareness, social engagement, healthy competition     Name: Donald Mcknight YOB: 1950   MR: [id number]      Facilitator: Recreational Therapist  Level of Participation: did not attend  Progress: None  Comments: Patient declined invitation to group activity at this time. Patient will continue to be provided with opportunities to enhance leisure skills and/or coping mechanisms.  Plan: continue with services

## 2024-03-10 NOTE — NURSING NOTE
Pt was up and down all night long  Pt took his medications but did not sleep much  Pt is incontinent of urine and was cleaned up during the night  Pt is alert and oriented  but confused at times

## 2024-03-10 NOTE — CARE PLAN
"  Problem: Safety  Goal: Patient will be injury free during hospitalization  Outcome: Progressing  Goal: I will remain free of falls  Outcome: Progressing     Problem: Daily Care  Goal: Daily care needs are met  Outcome: Progressing     Problem: Psychosocial Needs  Goal: Demonstrates ability to cope with hospitalization/illness  Outcome: Progressing  Goal: Collaborate with me, my family, and caregiver to identify my specific goals  Outcome: Progressing   The patient's goals for the shift include \"sleep well\"    The clinical goals for the shift include medication compliance    "

## 2024-03-10 NOTE — NURSING NOTE
"Pt interview in the patient room  Pt is loud and at times appropriate with his answers and then not   Pt is talkative and rambles a lot  Pt stated his goal \"sleep well\" his strength \" I like to fish\"  and his coping skill \"I watch TV\"  Pt rated his anxiety 7/10  depression 7/10  and pain across his shoulders 9/10  and denied everything else at this time  Pt is appropriate with his answers to the questions at this time    "

## 2024-03-10 NOTE — GROUP NOTE
Group Topic: Goals   Group Date: 3/10/2024  Start Time: 0930  End Time: 1025  Facilitators: TAN Dos Santos   Department: [facility] REHAB THERAPY VIRTUAL    Number of Participants: 8   Group Focus: goals, mindfulness, and personal responsibility  Treatment Modality: Other: Recreational Therapy   Interventions utilized were exploration, patient education, and support  Purpose: insight or knowledge and self-care, personal goals    Name: Donald Mcknight YOB: 1950   MR: [id number]      Facilitator: Recreational Therapist  Level of Participation: did not attend  Progress: None  Comments: Patient declined invitation to group activity at this time. Patient will continue to be provided with opportunities to enhance leisure skills and/or coping mechanisms.  Plan: continue with services

## 2024-03-10 NOTE — PROGRESS NOTES
"Donald Mcknight is a 73 y.o. male on day 7 of admission presenting with Depression with suicidal ideation.      Subjective   Donald Mcknight is a 73 y.o. year old male patient who was personally seen and interviewed, and discussed in morning team rounds. The patient was interviewed alone in his room (interviewed laying in bed, fully awake with eyes open). Pt greeted this provider with hand shaking, pleasant, engaged and cooperative. This morning, Donald reports feeling “better” as he is “comprehensive” for what is going on. Pt was \"tired\" “sleepy” much of yesterday after 4 hours UB Friday night (one hour Thursday night). Documented 2.5 hours broken last night, likely in part due to excessive daytime sleepiness.   Remeron 7.5mg qhs added 3/8/24.   No overt hallucinations or paranoia were endorsed or noted. Pt denies SI/HI.   Pt is compliant with medications, patient denied drug side effects. Will continue to monitor      Objective   Mental Status Exam:   General: Appropriately groomed and dressed in some hospital attire, laying in bed, awake.   Appearance: Appears stated age.   Attitude: Calm, cooperative.   Behavior: limited eye contact.   Motor Activity: No agitation or retardation. No EPS/TD. Impaired gait and station. Normal muscle tone and bulk.   Speech: Regular rate, rhythm, volume and tone, spontaneous, fluent. Non-pressured. Occasionally poor articulation.   Mood: feeling “better” as he is “comprehensive” for what is going on.   Affect: Neutral.   Thought Process: Organized, and goal directed.   Thought Content: Does not currently endorse suicidal ideation or any suicide plans.   Does not endorse homicidal ideation.  No overt delusions or paranoia elicited.    Thought Perception: Does not endorse auditory or visual hallucinations (see HPI).   Cognition: Alert, oriented x 3 currently. Adequate fund of knowledge. Possibly some deficit in recent and remote memory. No overt deficits in attention, concentration " or language.  MoCA (03-) = 14 out of 30 (Moderate Cognitive Impairment).   Insight: Poor-to-fair, as patient recognizes symptoms of  illness and need for recommended treatments.    Judgment: Poor-to-fair, as patient can make reasonable decisions about ordinary activities of daily living and necessary medical care recommendations.       LABS:  No results found for this or any previous visit (from the past 24 hour(s)).     Last Recorded Vitals  Visit Vitals  BP (!) 162/99 (BP Location: Right arm, Patient Position: Lying)   Pulse 83   Temp 36.1 °C (97 °F) (Temporal)   Resp 20        Intake/Output last 3 Shifts:  No intake/output data recorded.    Relevant Results  Scheduled medications  aspirin, 81 mg, oral, Daily  atorvastatin, 80 mg, oral, Nightly  calcium carbonate, 1,000 mg, oral, Daily  carvedilol, 12.5 mg, oral, BID  divalproex, 125 mg, oral, BID  famotidine, 20 mg, oral, Daily  FLUoxetine, 20 mg, oral, Daily  lidocaine, 1 patch, transdermal, Daily  lisinopril, 10 mg, oral, Daily  [START ON 3/11/2024] methylPREDNISolone, 4 mg, oral, Once  mirtazapine, 15 mg, oral, Nightly  nystatin, , Topical, BID  pantoprazole, 40 mg, oral, Daily before breakfast  perflutren lipid microspheres, 0.5-10 mL of dilution, intravenous, Once in imaging      Continuous medications     PRN medications  PRN medications: acetaminophen, albuterol, alum-mag hydroxide-simeth, hydrOXYzine pamoate, ketorolac, melatonin, OLANZapine **OR** OLANZapine, oxygen, psyllium         Assessment/Plan     1) Other Specified Depressive Disorder       Plan: 1) Fluoxetine 20 mg Qdaily (continue)               *2) trial Mirtazapine 7.5 mg at bedtime (sleep) 3/8/24, some improved sleep; to increase to 15mg qhs 3/10/2024     Discussed potential risks, benefits, and alternatives to medications with patient, who consented to the above medications.     2) Major Neurocognitive Disorder       Plan: 1) MoCA (03-) = 14 out of 30 (Moderate Cognitive  Impairment).  Consider to repeat after improvement in sleep, anxiety, depression, longer time post delirium     3) Delirium, acute - resolved       Plan: 1) Discontinue trial Depakote 250 -> 125 > 0.0 mg Q17:00 and Q21:00     4) HTN       Plan: 1) IM service to follow and manage.     5) COPD       Plan: 1) IM service to follow and manage.     6) ARTEMIO       Plan: 1) IM service to follow and manage.     7) Diverticulitis       Plan: 1) IM service to follow and manage.     8) GERD       Plan: 1) IM service to follow and manage.     9) Right hip pain       Plan: 1) IM service to follow and manage.        I spent 30 minutes in the professional and overall care of this patient.      Judson Funes MD PhD

## 2024-03-11 PROCEDURE — 2500000001 HC RX 250 WO HCPCS SELF ADMINISTERED DRUGS (ALT 637 FOR MEDICARE OP): Performed by: PSYCHIATRY & NEUROLOGY

## 2024-03-11 PROCEDURE — 2500000004 HC RX 250 GENERAL PHARMACY W/ HCPCS (ALT 636 FOR OP/ED): Performed by: NURSE PRACTITIONER

## 2024-03-11 PROCEDURE — 1240000001 HC SEMI-PRIVATE BH ROOM DAILY

## 2024-03-11 PROCEDURE — 2500000001 HC RX 250 WO HCPCS SELF ADMINISTERED DRUGS (ALT 637 FOR MEDICARE OP): Performed by: NURSE PRACTITIONER

## 2024-03-11 PROCEDURE — 99232 SBSQ HOSP IP/OBS MODERATE 35: CPT | Performed by: PSYCHIATRY & NEUROLOGY

## 2024-03-11 PROCEDURE — 94760 N-INVAS EAR/PLS OXIMETRY 1: CPT

## 2024-03-11 PROCEDURE — 2500000005 HC RX 250 GENERAL PHARMACY W/O HCPCS: Performed by: NURSE PRACTITIONER

## 2024-03-11 PROCEDURE — 2500000002 HC RX 250 W HCPCS SELF ADMINISTERED DRUGS (ALT 637 FOR MEDICARE OP, ALT 636 FOR OP/ED): Performed by: PSYCHIATRY & NEUROLOGY

## 2024-03-11 RX ADMIN — ATORVASTATIN CALCIUM 80 MG: 80 TABLET, FILM COATED ORAL at 20:13

## 2024-03-11 RX ADMIN — ASPIRIN 81 MG: 81 TABLET, COATED ORAL at 09:03

## 2024-03-11 RX ADMIN — FAMOTIDINE 20 MG: 20 TABLET ORAL at 09:04

## 2024-03-11 RX ADMIN — ALUMINUM HYDROXIDE, MAGNESIUM HYDROXIDE, AND DIMETHICONE 30 ML: 200; 20; 200 SUSPENSION ORAL at 20:13

## 2024-03-11 RX ADMIN — MIRTAZAPINE 15 MG: 15 TABLET, FILM COATED ORAL at 20:13

## 2024-03-11 RX ADMIN — DIVALPROEX SODIUM 125 MG: 125 TABLET, DELAYED RELEASE ORAL at 16:41

## 2024-03-11 RX ADMIN — CARVEDILOL 12.5 MG: 12.5 TABLET, FILM COATED ORAL at 20:13

## 2024-03-11 RX ADMIN — LISINOPRIL 10 MG: 5 TABLET ORAL at 09:04

## 2024-03-11 RX ADMIN — METHYLPREDNISOLONE 4 MG: 4 TABLET ORAL at 09:05

## 2024-03-11 RX ADMIN — LIDOCAINE 1 PATCH: 4 PATCH TOPICAL at 09:04

## 2024-03-11 RX ADMIN — FLUOXETINE HYDROCHLORIDE 20 MG: 20 CAPSULE ORAL at 09:04

## 2024-03-11 RX ADMIN — NYSTATIN: 100000 OINTMENT TOPICAL at 20:14

## 2024-03-11 RX ADMIN — DIVALPROEX SODIUM 125 MG: 125 TABLET, DELAYED RELEASE ORAL at 04:01

## 2024-03-11 RX ADMIN — CALCIUM CARBONATE (ANTACID) CHEW TAB 500 MG 1000 MG: 500 CHEW TAB at 09:03

## 2024-03-11 RX ADMIN — NYSTATIN: 100000 OINTMENT TOPICAL at 09:05

## 2024-03-11 RX ADMIN — HYDROXYZINE PAMOATE 25 MG: 25 CAPSULE ORAL at 20:13

## 2024-03-11 RX ADMIN — KETOROLAC TROMETHAMINE 10 MG: 10 TABLET, FILM COATED ORAL at 09:21

## 2024-03-11 RX ADMIN — KETOROLAC TROMETHAMINE 10 MG: 10 TABLET, FILM COATED ORAL at 20:13

## 2024-03-11 RX ADMIN — PANTOPRAZOLE SODIUM 40 MG: 40 TABLET, DELAYED RELEASE ORAL at 06:23

## 2024-03-11 RX ADMIN — CARVEDILOL 12.5 MG: 12.5 TABLET, FILM COATED ORAL at 09:04

## 2024-03-11 RX ADMIN — ACETAMINOPHEN 650 MG: 325 TABLET ORAL at 03:01

## 2024-03-11 RX ADMIN — HYDROXYZINE PAMOATE 25 MG: 25 CAPSULE ORAL at 04:01

## 2024-03-11 RX ADMIN — Medication 5 MG: at 20:13

## 2024-03-11 ASSESSMENT — COGNITIVE AND FUNCTIONAL STATUS - GENERAL
TOILETING: A LOT
MOBILITY SCORE: 11
MOVING TO AND FROM BED TO CHAIR: A LOT
DRESSING REGULAR UPPER BODY CLOTHING: A LOT
STANDING UP FROM CHAIR USING ARMS: A LOT
PERSONAL GROOMING: A LITTLE
HELP NEEDED FOR BATHING: A LOT
TURNING FROM BACK TO SIDE WHILE IN FLAT BAD: A LOT
CLIMB 3 TO 5 STEPS WITH RAILING: TOTAL
DRESSING REGULAR LOWER BODY CLOTHING: A LOT
DAILY ACTIVITIY SCORE: 14
EATING MEALS: A LITTLE
MOVING FROM LYING ON BACK TO SITTING ON SIDE OF FLAT BED WITH BEDRAILS: A LOT
WALKING IN HOSPITAL ROOM: A LOT

## 2024-03-11 ASSESSMENT — PAIN DESCRIPTION - LOCATION
LOCATION: SHOULDER
LOCATION: BACK
LOCATION: ARM

## 2024-03-11 ASSESSMENT — PAIN - FUNCTIONAL ASSESSMENT
PAIN_FUNCTIONAL_ASSESSMENT: 0-10

## 2024-03-11 ASSESSMENT — PAIN SCALES - WONG BAKER: WONGBAKER_NUMERICALRESPONSE: HURTS LITTLE BIT

## 2024-03-11 ASSESSMENT — PAIN SCALES - PAIN ASSESSMENT IN ADVANCED DEMENTIA (PAINAD)
CONSOLABILITY: DISTRACTED OR REASSURED BY VOICE/TOUCH
NEGVOCALIZATION: OCCASIONAL MOAN/GROAN, LOW SPEECH, NEGATIVE/DISAPPROVING QUALITY
BREATHING: NORMAL
BODYLANGUAGE: TENSE, DISTRESSED PACING, FIDGETING

## 2024-03-11 ASSESSMENT — PAIN SCALES - GENERAL
PAINLEVEL_OUTOF10: 8
PAINLEVEL_OUTOF10: 8
PAINLEVEL_OUTOF10: 6

## 2024-03-11 ASSESSMENT — PAIN DESCRIPTION - ORIENTATION
ORIENTATION: LEFT
ORIENTATION: RIGHT

## 2024-03-11 NOTE — NURSING NOTE
"Pt was assessed this morning while laying in bed , he was alert and cooperative. He rates anxiety 4-5/10 and depression 7/10. Pt states he lost his mother , best friend and sister all within three weeks. He states he's feeling better and thinks his medicine is helping. He identified strengths as \" dependable and helping people\". Pt received toradol @ 0922 this morning for discomfort in the chest area. He spoke with his wife \" Sophy\" today on the phone. Will continue to monitor for safety and encourage independence.  "

## 2024-03-11 NOTE — PROGRESS NOTES
"Donald Mcknight is a 73 y.o. year old male patient who is on U admission day 8.      Subjective   Donald Mcknight is a 73 y.o. year old male patient who was personally seen and interviewed, and discussed in morning team rounds. The patient was interviewed alone in his room (interviewed laying in bed with one leg hanging off the bed, awake and alert, poor eye contact), and was easily engaged and cooperative. This morning, Donald reports feeling \"okay\" and currently rates his depression at a 6 out of 10. No current suicidal ideation or plans were elicited. He also rates his anxiety at a 6 out of 10. No overt hallucinations or paranoia were endorsed or noted.   Donald slept 1.5 hour last night (broken).        Objective   Mental Status Exam:   General: Appropriately groomed and dressed in hospital attire, laying in bed, awake and alert.   Appearance: Appears stated age.   Attitude: Calm, cooperative.   Behavior: Poor eye contact.   Motor Activity: No agitation or retardation. No EPS/TD. Impaired gait and station. Normal muscle tone and bulk.   Speech: Regular rate, rhythm, volume and tone, spontaneous, fluent. Non-pressured. Occasionally poor articulation.   Mood: \"Okay\"   Affect: Neutral.   Thought Process: Mostly organized, and goal directed.   Thought Content: Does not currently endorse suicidal ideation or any suicide plans.   Does not endorse homicidal ideation.  No overt delusions or paranoia elicited.    Thought Perception: Does not endorse auditory or visual hallucinations (see HPI).   Cognition: Alert, oriented x 3 currently. Adequate fund of knowledge. Some deficit in recent and remote memory. Some deficits in attention and   Concentration. Intact language.  MoCA (03-) = 14 out of 30 (Moderate Cognitive Impairment).   Insight: Poor-to-fair, as patient recognizes symptoms of  illness and need for recommended treatments.    Judgment: Poor-to-fair, as patient can make reasonable decisions about ordinary " activities of daily living and necessary medical care recommendations.       LABS:  No results found for this or any previous visit (from the past 24 hour(s)).       Last Recorded Vitals  Visit Vitals  BP (!) 167/100 (Patient Position: Lying)   Pulse 86   Temp 36.1 °C (97 °F)   Resp 16        Intake/Output last 3 Shifts:  No intake/output data recorded.    Relevant Results  Scheduled medications  aspirin, 81 mg, oral, Daily  atorvastatin, 80 mg, oral, Nightly  calcium carbonate, 1,000 mg, oral, Daily  carvedilol, 12.5 mg, oral, BID  divalproex, 125 mg, oral, BID  famotidine, 20 mg, oral, Daily  FLUoxetine, 20 mg, oral, Daily  lidocaine, 1 patch, transdermal, Daily  lisinopril, 10 mg, oral, Daily  mirtazapine, 15 mg, oral, Nightly  nystatin, , Topical, BID  pantoprazole, 40 mg, oral, Daily before breakfast  perflutren lipid microspheres, 0.5-10 mL of dilution, intravenous, Once in imaging      Continuous medications     PRN medications  PRN medications: acetaminophen, albuterol, alum-mag hydroxide-simeth, hydrOXYzine pamoate, ketorolac, melatonin, OLANZapine **OR** OLANZapine, oxygen, psyllium               Assessment/Plan   1) Other Specified Depressive Disorder       Plan: 1) Fluoxetine 20 mg Qdaily (continue)                2) trial Mirtazapine 7.5 -> 15 mg at bedtime (sleep)     Discussed potential risks, benefits, and alternatives to medications with patient, who consented to the above medications.     2) Major Neurocognitive Disorder       Plan: 1) MoCA (03-) = 14 out of 30 (Moderate Cognitive Impairment).    3) Delirium, acute - resolved       Plan: 1) Discontinue trial Depakote 250 -> 125 > 0.0 mg Q17:00 and Q21:00     4) HTN       Plan: 1) IM service to follow and manage.     5) COPD       Plan: 1) IM service to follow and manage.     6) ARTEMIO       Plan: 1) IM service to follow and manage.     7) Diverticulitis       Plan: 1) IM service to follow and manage.     8) GERD       Plan: 1) IM service to  follow and manage.    9) Right hip pain       Plan: 1) IM service to follow and manage.            Palomo Tiwari MD

## 2024-03-11 NOTE — NURSING NOTE
"Pt interview in the patients room  Pt is loud at times but is cooperative  Pt stated his goal \" get sleep\"  his strength \"Im good at archery\" and his coping skill \"I take a look at it and figure it out\"  He rated his anxiety 7/10  depression 7/10  pain 8/10 all over and denied everything else Pt is appropriate with his answers to the questions at this time    "

## 2024-03-11 NOTE — PROGRESS NOTES
03/11/24 1608   Discharge Planning   Living Arrangements Spouse/significant other;Children   Support Systems Spouse/significant other;Children   Assistance Needed needs skilled nursing home care   Type of Residence Private residence   Who is requesting discharge planning? Provider   Type of Post Acute Facility Services Skilled nursing   Patient expects to be discharged to: home vs. skilled nursing home care     Spoke to wife, Sophy;  Informed her that with PT recommending skilled nursing home care, the patient would have to go to a [facility] at discharge (as part of his Secondary Insurance Benefit with the [facility]);  If the family and patient decide to have him go home with home health care, and if they choose to get home PT/OT, [facility] would not pay for it and neither would Medicare (as they do not have Part B); home PT/OT would be all out of pocket;  Wife stated she appreciated this sw's call and will meet with this sw at 1pm tomorrow during visiting to discuss this further.  Sw to follow.

## 2024-03-11 NOTE — CARE PLAN
"The patient's goals for the shift include \"get sleep\"    The clinical goals for the shift include medication compliance    Over the shift, the patient did not make progress toward the following goals. Barriers to progression include lack of medication knowledge. Recommendations to address these barriers include more education on medications.    "

## 2024-03-11 NOTE — NURSING NOTE
Pt did not sleep much during the night  Pt was given his nighttime medications and PRNs but pt did not sleep much  Pt is alert and oriented and then gets confused at times

## 2024-03-11 NOTE — CARE PLAN
"The patient's goals for the shift include \" watch sports\"    The clinical goals for the shift include medication compliance    Over the shift, the patient made progress toward the following goals of medication compliance.  Barriers to progression include lack of mobility. Recommendations to address these barriers include continue to encourage independence with ADL's. .    "

## 2024-03-11 NOTE — PROGRESS NOTES
Occupational Therapy     REHAB Therapy Assessment & Treatment    Patient Name: Donald Mcknight  MRN: [id number]  Today's Date: 3/11/2024      Activity Assessment:     Stress ID/Symptom Management Group: [id number]  Healthy Coping vs. Unhealthy Coping Group: [id number]  Cognitive Task/ Team Collaboration Group: [id number]  Acceptance Art and Positive Thinking Group: [id number]      0/4 Groups attended     Pt remains in bed, sleeping soundly and difficult to arouse in order to attend groups or engage in Tx session this date. No groups again this date. Pt would benefit from continued OT services in order to improve overall self-esteem, personal confidence and supports with increased awareness for safe transition location at discharge.                                  OT Goals       Pt will demo ADL routine and meaningful daily activities with Marina using modifications as needed  (Not Progressing)       Start:  03/05/24    Expected End:  04/02/24            Patient will attend to therapeutic task for 10 minutes with minimum verbal cues demonstrating improved attention and participation in daily activities.  (Not Progressing)       Start:  03/05/24    Expected End:  04/02/24            Pt will demo functional transfers to/ from EOB, chair and commode with Marina and LRD (Not Progressing)       Start:  03/05/24    Expected End:  04/02/24            Pt will ID/ utilize 1-2 ways to increase balance of activity/ re-involve self in functional daily routines/roles prior to discharge.   (Not Progressing)       Start:  03/05/24    Expected End:  04/02/24            Pt will explore and ID 1-2 strategies to manage stressors/symptoms of illness/ grief more effectively prior to discharge.   (Not Progressing)       Start:  03/05/24    Expected End:  04/02/24                               Additional Comments:  NHI collaborated with patients nurse and charge nurse throughout the day to provide the appropriate support and encouragement to  attend groups. Pt up on unit when HANKINS left last group of the day. All needs met.

## 2024-03-12 LAB
ATRIAL RATE: 88 BPM
ATRIAL RATE: 99 BPM
P AXIS: 51 DEGREES
P AXIS: 63 DEGREES
PR INTERVAL: 160 MS
PR INTERVAL: 167 MS
Q ONSET: 253 MS
Q ONSET: 253 MS
QRS COUNT: 14 BEATS
QRS COUNT: 15 BEATS
QRS DURATION: 92 MS
QRS DURATION: 99 MS
QT INTERVAL: 362 MS
QT INTERVAL: 399 MS
QTC CALCULATION(BAZETT): 458 MS
QTC CALCULATION(BAZETT): 480 MS
QTC FREDERICIA: 423 MS
QTC FREDERICIA: 451 MS
R AXIS: 29 DEGREES
R AXIS: 40 DEGREES
T AXIS: -42 DEGREES
T AXIS: 49 DEGREES
T OFFSET: 434 MS
T OFFSET: 453 MS
VENTRICULAR RATE: 87 BPM
VENTRICULAR RATE: 96 BPM

## 2024-03-12 PROCEDURE — 97530 THERAPEUTIC ACTIVITIES: CPT | Mod: GP,CQ

## 2024-03-12 PROCEDURE — 2500000005 HC RX 250 GENERAL PHARMACY W/O HCPCS: Performed by: NURSE PRACTITIONER

## 2024-03-12 PROCEDURE — 2500000001 HC RX 250 WO HCPCS SELF ADMINISTERED DRUGS (ALT 637 FOR MEDICARE OP): Performed by: NURSE PRACTITIONER

## 2024-03-12 PROCEDURE — 97530 THERAPEUTIC ACTIVITIES: CPT | Mod: GO

## 2024-03-12 PROCEDURE — 2500000001 HC RX 250 WO HCPCS SELF ADMINISTERED DRUGS (ALT 637 FOR MEDICARE OP): Performed by: PSYCHIATRY & NEUROLOGY

## 2024-03-12 PROCEDURE — 99232 SBSQ HOSP IP/OBS MODERATE 35: CPT | Performed by: PSYCHIATRY & NEUROLOGY

## 2024-03-12 PROCEDURE — 1240000001 HC SEMI-PRIVATE BH ROOM DAILY

## 2024-03-12 RX ADMIN — CARVEDILOL 12.5 MG: 12.5 TABLET, FILM COATED ORAL at 21:31

## 2024-03-12 RX ADMIN — NYSTATIN 1 APPLICATION: 100000 OINTMENT TOPICAL at 09:40

## 2024-03-12 RX ADMIN — CALCIUM CARBONATE (ANTACID) CHEW TAB 500 MG 1000 MG: 500 CHEW TAB at 09:39

## 2024-03-12 RX ADMIN — DIVALPROEX SODIUM 125 MG: 125 TABLET, DELAYED RELEASE ORAL at 17:53

## 2024-03-12 RX ADMIN — KETOROLAC TROMETHAMINE 10 MG: 10 TABLET, FILM COATED ORAL at 12:15

## 2024-03-12 RX ADMIN — ASPIRIN 81 MG: 81 TABLET, COATED ORAL at 09:39

## 2024-03-12 RX ADMIN — CARVEDILOL 12.5 MG: 12.5 TABLET, FILM COATED ORAL at 09:40

## 2024-03-12 RX ADMIN — ACETAMINOPHEN 650 MG: 325 TABLET ORAL at 17:54

## 2024-03-12 RX ADMIN — KETOROLAC TROMETHAMINE 10 MG: 10 TABLET, FILM COATED ORAL at 20:09

## 2024-03-12 RX ADMIN — ATORVASTATIN CALCIUM 80 MG: 80 TABLET, FILM COATED ORAL at 21:31

## 2024-03-12 RX ADMIN — PANTOPRAZOLE SODIUM 40 MG: 40 TABLET, DELAYED RELEASE ORAL at 05:49

## 2024-03-12 RX ADMIN — DIVALPROEX SODIUM 125 MG: 125 TABLET, DELAYED RELEASE ORAL at 05:49

## 2024-03-12 RX ADMIN — LIDOCAINE 1 PATCH: 4 PATCH TOPICAL at 09:40

## 2024-03-12 RX ADMIN — MIRTAZAPINE 15 MG: 15 TABLET, FILM COATED ORAL at 21:31

## 2024-03-12 RX ADMIN — FLUOXETINE HYDROCHLORIDE 20 MG: 20 CAPSULE ORAL at 09:40

## 2024-03-12 RX ADMIN — LISINOPRIL 10 MG: 5 TABLET ORAL at 09:40

## 2024-03-12 RX ADMIN — FAMOTIDINE 20 MG: 20 TABLET ORAL at 09:40

## 2024-03-12 ASSESSMENT — COGNITIVE AND FUNCTIONAL STATUS - GENERAL
TURNING FROM BACK TO SIDE WHILE IN FLAT BAD: A LOT
MOBILITY SCORE: 12
HELP NEEDED FOR BATHING: A LOT
DRESSING REGULAR UPPER BODY CLOTHING: A LOT
CLIMB 3 TO 5 STEPS WITH RAILING: A LOT
WALKING IN HOSPITAL ROOM: A LOT
EATING MEALS: A LITTLE
DRESSING REGULAR LOWER BODY CLOTHING: TOTAL
DAILY ACTIVITIY SCORE: 12
TOILETING: TOTAL
PERSONAL GROOMING: A LITTLE
MOVING TO AND FROM BED TO CHAIR: A LOT
MOVING FROM LYING ON BACK TO SITTING ON SIDE OF FLAT BED WITH BEDRAILS: A LOT
STANDING UP FROM CHAIR USING ARMS: A LOT

## 2024-03-12 ASSESSMENT — PAIN DESCRIPTION - LOCATION
LOCATION: OTHER (COMMENT)
LOCATION: ABDOMEN
LOCATION: SHOULDER

## 2024-03-12 ASSESSMENT — PAIN SCALES - GENERAL
PAINLEVEL_OUTOF10: 7
PAINLEVEL_OUTOF10: 4
PAINLEVEL_OUTOF10: 6
PAINLEVEL_OUTOF10: 8

## 2024-03-12 ASSESSMENT — PAIN - FUNCTIONAL ASSESSMENT
PAIN_FUNCTIONAL_ASSESSMENT: 0-10

## 2024-03-12 ASSESSMENT — PAIN DESCRIPTION - ORIENTATION
ORIENTATION: RIGHT;LEFT
ORIENTATION: LEFT
ORIENTATION: LEFT

## 2024-03-12 ASSESSMENT — PAIN SCALES - WONG BAKER: WONGBAKER_NUMERICALRESPONSE: HURTS EVEN MORE

## 2024-03-12 NOTE — CARE PLAN
Met with patient and wife during visiting;  told both that PT is recommending skilled nursing care at a skilled nursing facility;  both agreed; gave them list of [facility] nursing [facility] and they chose 3: [facility] and [facility];  told this this sw will complete the necessary paperwork and let them know;  gave wife this sw's business card to call with other choices; completing PASSR and nursing home referral in [facility] today;  sw to follow.     4:38 pm. Sw: completed PASSR in [facility];  sent skilled nursing home referral in [facility].

## 2024-03-12 NOTE — NURSING NOTE
"Patient was assessed this morning while he was laying in bed. He presents as pleasant , cooperative and loud. He states he feels medications are \" helping \". Pt reports anxiety 7/10 and depression 9/10 with no SI/HI or AVH reported. He continues to endorse discomfort in right chest/ shoulder area and received PRN medication as ordered. 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 Patient was up in the Broda chair with heavy, two person assist. Pt's wife in to visit today and social Work discussed patient discharging to rehab.  Wife agreed this would be best for patient at this time. Will continue to monitor for safety and encourage independence with ADL's.   "

## 2024-03-12 NOTE — CARE PLAN
"The patient's goals for the shift include \" watch the news\"    The clinical goals for the shift include medication compliance    Over the shift, the patient made progress toward the following goals of \" watching the news\".  Barriers to progression include acute illness. . Recommendations to address these barriers include Continue medication as ordered..    "

## 2024-03-12 NOTE — GROUP NOTE
Group Topic: Leisure Skills   Group Date: 3/12/2024  Start Time: 1100  End Time: 1145  Facilitators: TAN Dos Santos   Department: [facility] REHAB THERAPY VIRTUAL    Number of Participants: 7   Group Focus: leisure skills, problem solving, and social skills  Treatment Modality: Other: Recreational Therapy   Interventions utilized were leisure development, mental fitness, and problem solving  Purpose: other: leisure awareness, active leisure, social engagement, healthy competition     Name: Donald Mcknight YOB: 1950   MR: [id number]      Facilitator: Recreational Therapist  Level of Participation: did not attend  Progress: None  Comments: Patient declined invitation to group activity at this time. Patient will continue to be provided with opportunities to enhance leisure skills and/or coping mechanisms.  Plan: continue with services

## 2024-03-12 NOTE — GROUP NOTE
Group Topic: Stress Reduction/Relaxation   Group Date: 3/12/2024  Start Time: 1600  End Time: 1645  Facilitators: TAN Dos Santos   Department: [facility] REHAB THERAPY VIRTUAL    Number of Participants: 6   Group Focus: coping skills, mindfulness, and relaxation  Treatment Modality: Other: Recreational Therapy   Interventions utilized were exploration, patient education, and support  Purpose: coping skills, insight or knowledge, and other: stress reduction    Name: Donald Mcknight YOB: 1950   MR: [id number]      Facilitator: Recreational Therapist  Level of Participation: did not attend  Progress: None  Comments: Patient declined invitation to group activity at this time. Patient will continue to be provided with opportunities to enhance leisure skills and/or coping mechanisms.  Plan: continue with services

## 2024-03-12 NOTE — PROGRESS NOTES
"Donald Mcknight is a 73 y.o. year old male patient who is on U admission day 9.      Subjective   Donald Mcknight is a 73 y.o. year old male patient who was personally seen and interviewed, and discussed in morning team rounds. The patient was interviewed in his room along with his wife and the nurse (interviewed laying in bed, awake and alert, good eye contact today), and was easily engaged and cooperative. This morning, Donald reports feeling \"not bad\" and currently rates his depression at an 8 out of 10. No current suicidal ideation or plans were elicited. He also rates his anxiety at a 10 out of 10. No overt hallucinations or paranoia were endorsed or noted.   Donald slept 8 hour last night (unbroken).        Objective   Mental Status Exam:   General: Appropriately groomed and dressed in hospital attire, laying in bed, awake and alert.   Appearance: Appears stated age.   Attitude: Calm, cooperative.   Behavior: Good eye contact.   Motor Activity: No agitation or retardation. No EPS/TD. Impaired gait and station. Normal muscle tone and bulk.   Speech: Regular rate, rhythm, volume and tone, spontaneous, fluent. Non-pressured. Occasionally poor articulation.   Mood: \"Not bad\"   Affect: Neutral.   Thought Process: Mostly organized, and goal directed.   Thought Content: Does not currently endorse suicidal ideation or any suicide plans.   Does not endorse homicidal ideation.  No overt delusions or paranoia elicited.    Thought Perception: Does not endorse auditory or visual hallucinations (see HPI).   Cognition: Alert, oriented x 3 currently. Adequate fund of knowledge. Some deficit in recent and remote memory. Some deficits in attention and   Concentration. Intact language.  MoCA (03-) = 14 out of 30 (Moderate Cognitive Impairment).   Insight: Poor-to-fair, as patient recognizes symptoms of  illness and need for recommended treatments.    Judgment: Poor-to-fair, as patient can make reasonable decisions about " ordinary activities of daily living and necessary medical care recommendations.       LABS:  No results found for this or any previous visit (from the past 24 hour(s)).       Last Recorded Vitals  Visit Vitals  /86 (BP Location: Right arm, Patient Position: Lying)   Pulse 86   Temp 36.2 °C (97.2 °F) (Temporal)   Resp 18        Intake/Output last 3 Shifts:  No intake/output data recorded.    Relevant Results  Scheduled medications  aspirin, 81 mg, oral, Daily  atorvastatin, 80 mg, oral, Nightly  calcium carbonate, 1,000 mg, oral, Daily  carvedilol, 12.5 mg, oral, BID  divalproex, 125 mg, oral, BID  famotidine, 20 mg, oral, Daily  FLUoxetine, 20 mg, oral, Daily  lidocaine, 1 patch, transdermal, Daily  lisinopril, 10 mg, oral, Daily  mirtazapine, 15 mg, oral, Nightly  nystatin, , Topical, BID  pantoprazole, 40 mg, oral, Daily before breakfast  perflutren lipid microspheres, 0.5-10 mL of dilution, intravenous, Once in imaging      Continuous medications     PRN medications  PRN medications: acetaminophen, albuterol, alum-mag hydroxide-simeth, hydrOXYzine pamoate, ketorolac, melatonin, OLANZapine **OR** OLANZapine, oxygen, psyllium               Assessment/Plan   1) Other Specified Depressive Disorder       Plan: 1) Fluoxetine 20 mg Qdaily (continue)                2) trial Mirtazapine 7.5 -> 15 mg at bedtime (sleep)     Discussed potential risks, benefits, and alternatives to medications with patient, who consented to the above medications.     2) Major Neurocognitive Disorder       Plan: 1) MoCA (03-) = 14 out of 30 (Moderate Cognitive Impairment).    3) Delirium, acute - resolved       Plan: 1) Discontinue trial Depakote 250 -> 125 > 0.0 mg Q17:00 and Q21:00     4) HTN       Plan: 1) IM service to follow and manage.     5) COPD       Plan: 1) IM service to follow and manage.     6) ARTEMIO       Plan: 1) IM service to follow and manage.     7) Diverticulitis       Plan: 1) IM service to follow and manage.      8) GERD       Plan: 1) IM service to follow and manage.    9) Right hip pain       Plan: 1) IM service to follow and manage.            Palomo Tiwari MD

## 2024-03-12 NOTE — PROGRESS NOTES
"Occupational Therapy    OT Treatment    Patient Name: Donald Mcknight  MRN: [id number]  Today's Date: 3/12/2024  Time Calculation  Start Time: 1031  Stop Time: 1042  Time Calculation (min): 11 min         Assessment:  OT Assessment: Pt continues to demo functional strength deficits and impaired cognition, impacting pt's ability to perform ADL and functional mobility. Would benefit from continued OT services at moderate intensity to return to PLOF  End of Session Communication: Bedside nurse, PCT/NA/CTA  End of Session Patient Position: Bed, 3 rail up, Alarm off, caregiver present (bed alarm status confirmed with staff)  OT Assessment Results: Decreased ADL status, Decreased safe judgment during ADL, Decreased cognition, Decreased endurance, Decreased functional mobility  Evaluation/Treatment Tolerance: Treatment limited secondary to agitation  Medical Staff Made Aware: Yes  Plan:  Treatment Interventions: Functional transfer training, Endurance training, Compensatory technique education  OT Frequency: 3 times per week  OT Discharge Recommendations: Moderate intensity level of continued care  OT - OK to Discharge: Yes  Treatment Interventions: Functional transfer training, Endurance training, Compensatory technique education    Subjective   Previous Visit Info:  OT Last Visit  OT Received On: 03/12/24  General:  General  Co-Treatment: PT  Co-Treatment Reason: to maximize pt outcomes  Prior to Session Communication: Bedside nurse, PCT/NA/KAYKAY  Patient Position Received: Bed, 3 rail up  General Comment: PTA present and initated treatment on OT arrival. NC in place. Pt reports increased levels of anxiety today and \"I am not in a good mood\". Pt is able to be redirected for participation initially, then progressively became more agitated and treatment ended to avoid pt escalation.  Precautions:  Medical Precautions: Fall precautions  Precautions Comment: Pt has a known risk of violence    Pain:  Pain Assessment  Pain " "Assessment: 0-10  Pain Score:  (Unrated L sided abdomen pain, and reports chronic L knee pain and weakness. Not requesting any intervention)    Objective    Cognition:  Cognition  Following Commands: Follows one step commands with repetition  Safety Judgment: Decreased awareness of need for assistance  Activities of Daily Living: LE Dressing  LE Dressing: Yes  Sock Level of Assistance: Dependent  LE Dressing Where Assessed: Edge of bed    Bed Mobility/Transfers: Bed Mobility  Bed Mobility: Yes  Bed Mobility 1  Bed Mobility 1: Supine to sitting, Sitting to supine  Level of Assistance 1: Moderate assistance (x2 assist)  Bed Mobility Comments 1: multiple trials of supine<>sit. Functional strength deficit as well as behvaioral limitations observed  Bed Mobility 2  Bed Mobility  2: Rolling right  Level of Assistance 2: Moderate assistance  Bed Mobility Comments 2: multiple trials to encourage log roll technique    Transfers  Transfer: Yes  Transfer 1  Technique 1: Sit to stand, Stand to sit  Transfer Device 1: Walker  Transfer Level of Assistance 1: Moderate assistance  Trials/Comments 1: assist x2 to complete partial sit<>stand with surface height of bed raised. Pt impulsively returns to sitting in effort to end this session  Therapy/Activity: Therapeutic Activity  Therapeutic Activity Performed: Yes  Therapeutic Activity 1: Pt instructed in importance of maintaining a positive attitude and focusing on the present. Pt repeats \"I can't do this, I knoow I can't. My legs don't work\" Edu provided on OT intention in subsequent treatments to assist pt in meeting goals despite pain and other challenges.    Outcome Measures:[facility] Daily Activity  Putting on and taking off regular lower body clothing: Total  Bathing (including washing, rinsing, drying): A lot  Putting on and taking off regular upper body clothing: A lot  Toileting, which includes using toilet, bedpan or urinal: Total  Taking care of personal grooming such as " brushing teeth: A little  Eating Meals: A little  Daily Activity - Total Score: 12        Education Documentation  Body Mechanics, taught by Jessica Ayala OT at 3/12/2024 11:36 AM.  Learner: Patient  Readiness: Acceptance  Method: Explanation, Demonstration  Response: Needs Reinforcement  Goals:  Encounter Problems            OT Goals       Pt will demo ADL routine and meaningful daily activities with Marina using modifications as needed  (Progressing)       Start:  03/05/24    Expected End:  04/02/24            Patient will attend to therapeutic task for 10 minutes with minimum verbal cues demonstrating improved attention and participation in daily activities.  (Progressing)       Start:  03/05/24    Expected End:  04/02/24            Pt will demo functional transfers to/ from EOB, chair and commode with Marina and LRD (Progressing)       Start:  03/05/24    Expected End:  04/02/24            Pt will ID/ utilize 1-2 ways to increase balance of activity/ re-involve self in functional daily routines/roles prior to discharge.   (Progressing)       Start:  03/05/24    Expected End:  04/02/24            Pt will explore and ID 1-2 strategies to manage stressors/symptoms of illness/ grief more effectively prior to discharge.   (Progressing)       Start:  03/05/24    Expected End:  04/02/24

## 2024-03-12 NOTE — CARE PLAN
Pt assessed, he is lying in bed. He states he had a 'great' day and appears pleasant and participated in positive conversations. Calm and cooperative. Pt reate anxiety 9/10, depression 7/10. Denies SI/HI, AH/VH. Endorses left shoulder discomfort. Last BM 3/11. States his strength is playing sports and goal is to get better. Pt repositions independently and states he is clean and dry.

## 2024-03-12 NOTE — CARE PLAN
Problem: Sensory Perceptual Alteration as Evidenced by  Goal: Cooperates with admission process  Outcome: Progressing  Goal: Patient/Family participate in treatment and discharge plans  Outcome: Progressing  Goal: Patient/Family verbalizes awareness of resources  Outcome: Progressing  Goal: Participates in unit activities  Outcome: Progressing  Goal: Discusses signs/symptoms of illness/treatment options  Outcome: Progressing  Goal: Initiates reality-based interactions  Outcome: Progressing  Goal: Able to discuss content of hallucinations/delusions  Outcome: Progressing  Goal: Notifies staff when experiencing hallucinations/delusions  Outcome: Progressing  Goal: Verbalizes reduction in hallucinations/delusions  Outcome: Progressing  Goal: Will not act on psychotic perception  Outcome: Progressing  Goal: Understands least restrictive measures  Outcome: Progressing  Goal: Free from restraint events  Outcome: Progressing     Problem: Altered Thought Processes as Evidenced by  Goal: STG - Desires improvement in ability to think and concentrate  Outcome: Progressing  Goal: STG - Participates in Occupational Therapy and other cognitive assessments  Outcome: Progressing     Problem: Potential for Harm to Self or Others  Goal: Cooperates with admission process  Outcome: Progressing  Goal: Participates in unit activities  Outcome: Progressing  Goal: Patient/Family participate in treatment and discharge plans  Outcome: Progressing  Goal: Identifies deescalation techniques  Outcome: Progressing  Goal: Understands least restrictive measures  Outcome: Progressing  Goal: Identifies stressors that lead to harmful behaviors  Outcome: Progressing  Goal: Notifies staff when experiencing harmful thoughts toward self/others  Outcome: Progressing  Goal: Denies harm toward self or others  Outcome: Progressing  Goal: Free from restraint events  Outcome: Progressing     Problem: Educational/Scholastic Disruption  Goal: Meets educational  requirements during hospitalization  Outcome: Progressing  Goal: Attends class without disruptive behavior  Outcome: Progressing  Goal: Completes daily assignments  Outcome: Progressing     Problem: Ineffective Coping  Goal: Cooperates with admission process  Outcome: Progressing  Goal: Identifies ineffective coping skills  Outcome: Progressing  Goal: Identifies healthy coping skills  Outcome: Progressing  Goal: Demonstrates healthy coping skills  Outcome: Progressing  Goal: Participates in unit activities  Outcome: Progressing  Goal: Patient/Family participate in treatment and discharge plans  Outcome: Progressing  Goal: Patient/Family verbalizes awareness of resources  Outcome: Progressing  Goal: Understands least restrictive measures  Outcome: Progressing  Goal: Free from restraint events  Outcome: Progressing     Problem: Alteration in Sleep  Goal: STG - Reports nightly sleep, duration, and quality  Outcome: Progressing  Goal: STG - Identifies sleep hygiene aids  Outcome: Progressing  Goal: STG - Informs staff if unable to sleep  Outcome: Progressing  Goal: STG - Attends breathing and relaxation group  Outcome: Progressing     Problem: Potential for Substance Withdrawal  Goal: Verbalizes signs/symptoms of withdrawal  Outcome: Progressing  Goal: Reports signs/symptoms of withdrawal  Outcome: Progressing  Goal: Free of withdrawal symptoms  Outcome: Progressing     Problem: Anxiety  Goal: Patient/family understands admission protocols  Outcome: Progressing  Goal: Attempts to manage anxiety with help  Outcome: Progressing  Goal: Verbalizes ways to manage anxiety  Outcome: Progressing  Goal: Implements measures to reduce anxiety  Outcome: Progressing  Goal: Free from restraint events  Outcome: Progressing     Problem: Self Care Deficit  Goal: STG - Patient completes hygiene  Outcome: Progressing  Goal: Increase group attendance  Outcome: Progressing  Goal: Accepts need for medications  Outcome: Progressing  Goal: STG  - Goes to and eats meals independently in dining room 100% of time  Outcome: Progressing     Problem: Defensive Coping  Goal: Cooperates with admission process  Outcome: Progressing  Goal: Identifies reckless/dangerous behavior  Outcome: Progressing  Goal: Identifies stressors that lead to reckless/dangerous behavior  Outcome: Progressing  Goal: Discusses and identifies healthy coping skills  Outcome: Progressing  Goal: Demonstrates healthy coping skills  Outcome: Progressing  Goal: Identifies appropriate social interaction  Outcome: Progressing  Goal: Demonstrates appropriate social interactions  Outcome: Progressing  Goal: Patient/Family verbalizes awareness of resources  Outcome: Progressing  Goal: Discusses signs/symptoms of illness/treatment options  Outcome: Progressing  Goal: Patient/Family participate in treatment and discharge plans  Outcome: Progressing  Goal: Understands least restrictive measures  Outcome: Progressing  Goal: Free from restraint events  Outcome: Progressing     Problem: Pain  Goal: My pain/discomfort is manageable  Outcome: Progressing     Pt had uneventful night. Medicated for anxiety, pain, and indigestion. Slept uninterrupted throughout the night. Refused bipap. Pt med compliant and pleasant throughout shift.

## 2024-03-12 NOTE — GROUP NOTE
Group Topic: Cognitive Behavioral Therapy   Group Date: 3/12/2024  Start Time: 1400  End Time: 1445  Facilitators: TAN Dos Santos; ANUPAMA BolañosS   Department: [facility] REHAB THERAPY VIRTUAL    Number of Participants: 7   Group Focus: coping skills and other C.B.T. skill building  Treatment Modality: Recreation Therapy  Interventions utilized were exploration, other cognitive stimulation, and patient education  Purpose: coping skills, insight or knowledge, and other: understanding thought distortions    Name: Donald Mcknight YOB: 1950   MR: [id number]      Facilitator: Recreational Therapist  Level of Participation: did not attend  Progress: None  Comments: Session focused on discussing, understanding, and sharing thought distortions that may interfere with one's life. Group participants were provided a handout which included ten distortions and six coping strategies to better manage or challenge those thinking habits. Patients were encouraged to share thoughts and opinions about the topics and provide the group with examples that could be worked on together.     Patient remained in his room throughout the session.   Plan: continue with services

## 2024-03-12 NOTE — PROGRESS NOTES
"Physical Therapy    Physical Therapy Treatment    Patient Name: Donald Mcknight  MRN: [id number]  Today's Date: 3/12/2024  Time Calculation  Start Time: 1028  Stop Time: 1045  Time Calculation (min): 17 min       Assessment/Plan   PT Assessment  PT Assessment Results: Decreased strength, Decreased range of motion, Decreased endurance, Impaired balance, Decreased mobility, Decreased cognition, Impaired judgement, Decreased safety awareness, Obesity, Pain  Barriers to Discharge: cognition  End of Session Communication: Bedside nurse  Assessment Comment: pt presents with impaired cognition, mobility, balance, inability to ambualate and decreased safety awareness; pt questionable historian; pt would benefit from continued skilled therapy to address impairements; pt supine in bed at end of PT session     PT Plan  Treatment/Interventions: Bed mobility, Transfer training, Gait training, Balance training, Strengthening, Endurance training, Range of motion, Therapeutic exercise, Therapeutic activity  PT Plan: Skilled PT  PT Frequency: 3 times per week  PT Discharge Recommendations: Moderate intensity level of continued care  Equipment Recommended upon Discharge: Wheeled walker  PT Recommended Transfer Status: Assist x2  PT - OK to Discharge: Yes      General Visit Information:   PT  Visit  PT Received On: 03/12/24  General  Referred By: Teresa Myers APRN-CNP  Past Medical History Relevant to Rehab: COPD and on 2-3 L, HTN, ARTEMIO, HPLD, depression, PTSD, GERD & obesity, OA of L knee,  Co-Treatment: OT  Co-Treatment Reason: To maximize pt outcomes during mobility and physical assessment portion of evaluation  Prior to Session Communication: Bedside nurse  Patient Position Received: Bed, 3 rail up, Alarm on  General Comment: in [facility] [place] on 02 3L AXOX1, needs encouragement. INtermittently agreeable to therapy.    Subjective   Precautions:     Vital Signs:       Objective   Pain:  Pain Assessment  Pain Assessment:  (\"I'm in pain " "everywhere\" states patient. points to chest area, back, abdomin, \"my left knee dont work at all\" no  numerical value given.)  Pain Score:  (chinyeren will be pleasant as if in no pain and out of the blue with mobility or lying or sitting stil we blurt out \"ouch, the pain, it hurts\")  Cognition:  Cognition  Overall Cognitive Status: Impaired at baseline  Orientation Level: Disoriented to situation, Disoriented to time  Following Commands: Follows one step commands with repetition  Postural Control:     Extremity/Trunk Assessments:    Activity Tolerance:  Activity Tolerance  Endurance: Tolerates 10 - 20 min exercise with multiple rests  Treatments:            Therapeutic Activity  Therapeutic Activity 1: static/dynamic sitting EOB x 6 minutes, prior to this he was lying 1/2 on and 1/2 off bed x8 minutes with CGA/SBA. (log roll attempted x2 and paitne thollered in pain \"across my chest\" stated patient, eventually got patient up to EOB  and he plopped back to supine briefly after and then got paitne up to EOB again, feet on the floor, Max A x2, VC for safety & technique)    Balance/Neuromuscular Re-Education  Balance/Neuromuscular Re-Education Activity Performed:  (patietn would impulsive return to supne from sitting EOB)              Bed Mobility  Bed Mobility: Yes  Bed Mobility 1  Bed Mobility 1: Supine to sitting, Log roll  Level of Assistance 1: Maximum assistance, Moderate assistance, Minimal tactile cues  Bed Mobility Comments 1: impulsive    Ambulation/Gait Training  Ambulation/Gait Training Performed: No  Transfers  Transfer: No (attempted x1 with raised surface and Max A x2 and VC and chinyeren would slightly initiate and then stop.  unable to complete)                     Outcome Measures:  [facility] Basic Mobility  Turning from your back to your side while in a flat bed without using bedrails: A lot  Moving from lying on your back to sitting on the side of a flat bed without using bedrails: A lot  Moving to and from " bed to chair (including a wheelchair): A lot  Standing up from a chair using your arms (e.g. wheelchair or bedside chair): A lot  To walk in hospital room: A lot  Climbing 3-5 steps with railing: A lot  Basic Mobility - Total Score: 12    Education Documentation  potassium chloride, taught by Lian Tristan PTA at 3/12/2024 11:59 AM.  Learner: Patient  Readiness: Nonacceptance  Method: Demonstration  Response: Needs Reinforcement, No Evidence of Learning, Refused Teaching  Comment: confused    Body Mechanics, taught by Lian Tristan PTA at 3/12/2024 11:59 AM.  Learner: Patient  Readiness: Nonacceptance  Method: Demonstration  Response: Needs Reinforcement, No Evidence of Learning, Refused Teaching  Comment: confused    Body Mechanics, taught by Lian Tristan PTA at 3/12/2024 11:59 AM.  Learner: Patient  Readiness: Nonacceptance  Method: Demonstration  Response: Needs Reinforcement, No Evidence of Learning, Refused Teaching  Comment: confused    Mobility Training, taught by Lian Tristan PTA at 3/12/2024 11:59 AM.  Learner: Patient  Readiness: Nonacceptance  Method: Demonstration  Response: Needs Reinforcement, No Evidence of Learning, Refused Teaching  Comment: confused    Education Comments  No comments found.        OP EDUCATION:       Encounter Problems       Encounter Problems (Active)       Balance       Pt will demo dynamic sitting balance x5 minutes with no UE support and S/I to improve stability and decrease falls  (Progressing)       Start:  03/05/24    Expected End:  03/19/24            Pt will demo static/dynamic standing balance x5 minutes with B UE support and S/I to improve stability and decrease falls  (Not Progressing)       Start:  03/05/24    Expected End:  03/19/24               Mobility       X15 reps ther ex to increase general strength and improve functional independence   (Not Progressing)       Start:  03/05/24    Expected End:  03/19/24            X150 feet with/without use of DME and S/I  assist  necessary to initiate return to PLOF  (Not Progressing)       Start:  03/05/24    Expected End:  03/19/24            pt will tolerate 30 minutes with 1 rest breaks and maintaining O2 sats >88% on baseline 3L O2 necessary for improved functional endurance   (Progressing)       Start:  03/05/24    Expected End:  03/19/24               Transfers       Pt will complete all functional transfers with S/I necessary to initiate return to PLOF   (Progressing)       Start:  03/05/24    Expected End:  03/19/24

## 2024-03-13 PROCEDURE — 2500000004 HC RX 250 GENERAL PHARMACY W/ HCPCS (ALT 636 FOR OP/ED): Performed by: INTERNAL MEDICINE

## 2024-03-13 PROCEDURE — 1240000001 HC SEMI-PRIVATE BH ROOM DAILY

## 2024-03-13 PROCEDURE — 2500000001 HC RX 250 WO HCPCS SELF ADMINISTERED DRUGS (ALT 637 FOR MEDICARE OP): Performed by: PSYCHIATRY & NEUROLOGY

## 2024-03-13 PROCEDURE — 2500000005 HC RX 250 GENERAL PHARMACY W/O HCPCS: Performed by: NURSE PRACTITIONER

## 2024-03-13 PROCEDURE — 2500000002 HC RX 250 W HCPCS SELF ADMINISTERED DRUGS (ALT 637 FOR MEDICARE OP, ALT 636 FOR OP/ED): Performed by: PSYCHIATRY & NEUROLOGY

## 2024-03-13 PROCEDURE — 2500000001 HC RX 250 WO HCPCS SELF ADMINISTERED DRUGS (ALT 637 FOR MEDICARE OP): Performed by: NURSE PRACTITIONER

## 2024-03-13 PROCEDURE — 99232 SBSQ HOSP IP/OBS MODERATE 35: CPT | Performed by: PSYCHIATRY & NEUROLOGY

## 2024-03-13 RX ADMIN — FLUOXETINE HYDROCHLORIDE 20 MG: 20 CAPSULE ORAL at 08:06

## 2024-03-13 RX ADMIN — PANTOPRAZOLE SODIUM 40 MG: 40 TABLET, DELAYED RELEASE ORAL at 06:09

## 2024-03-13 RX ADMIN — CALCIUM CARBONATE (ANTACID) CHEW TAB 500 MG 1000 MG: 500 CHEW TAB at 08:06

## 2024-03-13 RX ADMIN — KETOROLAC TROMETHAMINE 10 MG: 10 TABLET, FILM COATED ORAL at 08:06

## 2024-03-13 RX ADMIN — LISINOPRIL 10 MG: 5 TABLET ORAL at 08:06

## 2024-03-13 RX ADMIN — NYSTATIN: 100000 OINTMENT TOPICAL at 08:08

## 2024-03-13 RX ADMIN — ASPIRIN 81 MG: 81 TABLET, COATED ORAL at 08:06

## 2024-03-13 RX ADMIN — DIVALPROEX SODIUM 125 MG: 125 TABLET, DELAYED RELEASE ORAL at 05:41

## 2024-03-13 RX ADMIN — MIRTAZAPINE 15 MG: 15 TABLET, FILM COATED ORAL at 20:57

## 2024-03-13 RX ADMIN — SODIUM CHLORIDE 1000 ML: 9 INJECTION, SOLUTION INTRAVENOUS at 07:00

## 2024-03-13 RX ADMIN — CARVEDILOL 12.5 MG: 12.5 TABLET, FILM COATED ORAL at 20:57

## 2024-03-13 RX ADMIN — HYDROXYZINE PAMOATE 25 MG: 25 CAPSULE ORAL at 20:57

## 2024-03-13 RX ADMIN — DIVALPROEX SODIUM 125 MG: 125 TABLET, DELAYED RELEASE ORAL at 18:00

## 2024-03-13 RX ADMIN — KETOROLAC TROMETHAMINE 10 MG: 10 TABLET, FILM COATED ORAL at 14:11

## 2024-03-13 RX ADMIN — ATORVASTATIN CALCIUM 80 MG: 80 TABLET, FILM COATED ORAL at 20:57

## 2024-03-13 RX ADMIN — FAMOTIDINE 20 MG: 20 TABLET ORAL at 08:06

## 2024-03-13 RX ADMIN — LIDOCAINE 1 PATCH: 4 PATCH TOPICAL at 08:06

## 2024-03-13 RX ADMIN — CARVEDILOL 12.5 MG: 12.5 TABLET, FILM COATED ORAL at 08:06

## 2024-03-13 RX ADMIN — KETOROLAC TROMETHAMINE 10 MG: 10 TABLET, FILM COATED ORAL at 20:57

## 2024-03-13 RX ADMIN — KETOROLAC TROMETHAMINE 10 MG: 10 TABLET, FILM COATED ORAL at 02:20

## 2024-03-13 ASSESSMENT — PAIN - FUNCTIONAL ASSESSMENT
PAIN_FUNCTIONAL_ASSESSMENT: 0-10

## 2024-03-13 ASSESSMENT — PAIN SCALES - PAIN ASSESSMENT IN ADVANCED DEMENTIA (PAINAD)
FACIALEXPRESSION: SMILING OR INEXPRESSIVE
NEGVOCALIZATION: OCCASIONAL MOAN/GROAN, LOW SPEECH, NEGATIVE/DISAPPROVING QUALITY
CONSOLABILITY: DISTRACTED OR REASSURED BY VOICE/TOUCH
BODYLANGUAGE: TENSE, DISTRESSED PACING, FIDGETING
TOTALSCORE: 3
BREATHING: NORMAL

## 2024-03-13 ASSESSMENT — PAIN SCALES - WONG BAKER
WONGBAKER_NUMERICALRESPONSE: NO HURT
WONGBAKER_NUMERICALRESPONSE: HURTS WHOLE LOT

## 2024-03-13 ASSESSMENT — PAIN SCALES - GENERAL
PAINLEVEL_OUTOF10: 8
PAINLEVEL_OUTOF10: 4
PAINLEVEL_OUTOF10: 5 - MODERATE PAIN
PAINLEVEL_OUTOF10: 8
PAINLEVEL_OUTOF10: 3

## 2024-03-13 ASSESSMENT — PAIN DESCRIPTION - LOCATION: LOCATION: HIP

## 2024-03-13 ASSESSMENT — PAIN DESCRIPTION - DESCRIPTORS: DESCRIPTORS: ACHING;DISCOMFORT

## 2024-03-13 ASSESSMENT — PAIN DESCRIPTION - ORIENTATION: ORIENTATION: RIGHT;LEFT
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
## 2024-03-13 NOTE — CARE PLAN
"The patient's goals for the shift include \"get up and walk\"    The clinical goals for the shift include For patient to remain injury free and continue medication compliance    Over the shift, the patient did not make progress toward the following goals. Barriers to progression include acuteness of illness. Recommendations to address these barriers include positive reinforcement .    Problem: Sensory Perceptual Alteration as Evidenced by  Goal: Cooperates with admission process  Outcome: Not Progressing  Goal: Patient/Family participate in treatment and discharge plans  Outcome: Not Progressing  Goal: Patient/Family verbalizes awareness of resources  Outcome: Not Progressing  Goal: Participates in unit activities  Outcome: Not Progressing  Goal: Discusses signs/symptoms of illness/treatment options  Outcome: Not Progressing  Goal: Initiates reality-based interactions  Outcome: Not Progressing  Goal: Able to discuss content of hallucinations/delusions  Outcome: Not Progressing  Goal: Notifies staff when experiencing hallucinations/delusions  Outcome: Not Progressing  Goal: Verbalizes reduction in hallucinations/delusions  Outcome: Not Progressing  Goal: Will not act on psychotic perception  Outcome: Not Progressing  Goal: Understands least restrictive measures  Outcome: Not Progressing  Goal: Free from restraint events  Outcome: Not Progressing     Problem: Altered Thought Processes as Evidenced by  Goal: STG - Desires improvement in ability to think and concentrate  Outcome: Not Progressing  Goal: STG - Participates in Occupational Therapy and other cognitive assessments  Outcome: Not Progressing     Problem: Potential for Harm to Self or Others  Goal: Cooperates with admission process  Outcome: Not Progressing  Goal: Participates in unit activities  Outcome: Not Progressing  Goal: Patient/Family participate in treatment and discharge plans  Outcome: Not Progressing  Goal: Identifies deescalation techniques  Outcome: " Not Progressing  Goal: Understands least restrictive measures  Outcome: Not Progressing  Goal: Identifies stressors that lead to harmful behaviors  Outcome: Not Progressing  Goal: Notifies staff when experiencing harmful thoughts toward self/others  Outcome: Not Progressing  Goal: Denies harm toward self or others  Outcome: Not Progressing  Goal: Free from restraint events  Outcome: Not Progressing     Problem: Educational/Scholastic Disruption  Goal: Meets educational requirements during hospitalization  Outcome: Not Progressing  Goal: Attends class without disruptive behavior  Outcome: Not Progressing  Goal: Completes daily assignments  Outcome: Not Progressing     Problem: Ineffective Coping  Goal: Cooperates with admission process  Outcome: Not Progressing  Goal: Identifies ineffective coping skills  Outcome: Not Progressing  Goal: Identifies healthy coping skills  Outcome: Not Progressing  Goal: Demonstrates healthy coping skills  Outcome: Not Progressing  Goal: Participates in unit activities  Outcome: Not Progressing  Goal: Patient/Family participate in treatment and discharge plans  Outcome: Not Progressing  Goal: Patient/Family verbalizes awareness of resources  Outcome: Not Progressing  Goal: Understands least restrictive measures  Outcome: Not Progressing  Goal: Free from restraint events  Outcome: Not Progressing     Problem: Alteration in Sleep  Goal: STG - Reports nightly sleep, duration, and quality  Outcome: Not Progressing  Goal: STG - Identifies sleep hygiene aids  Outcome: Not Progressing  Goal: STG - Informs staff if unable to sleep  Outcome: Not Progressing  Goal: STG - Attends breathing and relaxation group  Outcome: Not Progressing     Problem: Potential for Substance Withdrawal  Goal: Verbalizes signs/symptoms of withdrawal  Outcome: Not Progressing  Goal: Reports signs/symptoms of withdrawal  Outcome: Not Progressing  Goal: Free of withdrawal symptoms  Outcome: Not Progressing     Problem:  Anxiety  Goal: Patient/family understands admission protocols  Outcome: Not Progressing  Goal: Attempts to manage anxiety with help  Outcome: Not Progressing  Goal: Verbalizes ways to manage anxiety  Outcome: Not Progressing  Goal: Implements measures to reduce anxiety  Outcome: Not Progressing  Goal: Free from restraint events  Outcome: Not Progressing     Problem: Self Care Deficit  Goal: STG - Patient completes hygiene  Outcome: Not Progressing  Goal: Increase group attendance  Outcome: Not Progressing  Goal: Accepts need for medications  Outcome: Not Progressing  Goal: STG - Goes to and eats meals independently in dining room 100% of time  Outcome: Not Progressing     Problem: Defensive Coping  Goal: Cooperates with admission process  Outcome: Not Progressing  Goal: Identifies reckless/dangerous behavior  Outcome: Not Progressing  Goal: Identifies stressors that lead to reckless/dangerous behavior  Outcome: Not Progressing  Goal: Discusses and identifies healthy coping skills  Outcome: Not Progressing  Goal: Demonstrates healthy coping skills  Outcome: Not Progressing  Goal: Identifies appropriate social interaction  Outcome: Not Progressing  Goal: Demonstrates appropriate social interactions  Outcome: Not Progressing  Goal: Patient/Family verbalizes awareness of resources  Outcome: Not Progressing  Goal: Discusses signs/symptoms of illness/treatment options  Outcome: Not Progressing  Goal: Patient/Family participate in treatment and discharge plans  Outcome: Not Progressing  Goal: Understands least restrictive measures  Outcome: Not Progressing  Goal: Free from restraint events  Outcome: Not Progressing     Problem: Pain  Goal: My pain/discomfort is manageable  Outcome: Not Progressing

## 2024-03-13 NOTE — NURSING NOTE
"Pt was in bed all day tried to talk pt in to getting up out of bed for a while pt refused stated \" It wouldn't be bad if you had a TV in the room \" Pt laid in bed all day and screamed out for something about every 5 minutes. Anxiety 8/10 depression 10/10.  Pt denied SI/HI and A/V/H. Safety maintained. Med compliant. Pt contracted for safety with this staff at this time.   "

## 2024-03-13 NOTE — PROGRESS NOTES
Occupational Therapy     REHAB Therapy Assessment & Treatment    Patient Name: Donald Mcknight  MRN: [id number]  Today's Date: 3/13/2024      Activity Assessment:   Future Thinking/ LTG vs. STG Group: [id number]  Positive Changes/ Phases of Life Group: [id number]  Decision Making/ Meaningful Sharing Group: [id number]    0/3 Groups attended     Pt remains in bed throughout groups and unable to attend this date. Pt usually in bed throughout the day and continues to be heard yelling out/confused. No HANKINS groups this date. Pt would benefit from continued OT services in order to improve overall self-esteem, personal confidence and supports with increased awareness for safe transition location at discharge.                                   OT Goals       Pt will demo ADL routine and meaningful daily activities with Marina using modifications as needed  (Progressing)       Start:  03/05/24    Expected End:  04/02/24            Patient will attend to therapeutic task for 10 minutes with minimum verbal cues demonstrating improved attention and participation in daily activities.  (Progressing)       Start:  03/05/24    Expected End:  04/02/24            Pt will demo functional transfers to/ from EOB, chair and commode with Marina and LRD (Progressing)       Start:  03/05/24    Expected End:  04/02/24            Pt will ID/ utilize 1-2 ways to increase balance of activity/ re-involve self in functional daily routines/roles prior to discharge.   (Progressing)       Start:  03/05/24    Expected End:  04/02/24            Pt will explore and ID 1-2 strategies to manage stressors/symptoms of illness/ grief more effectively prior to discharge.   (Progressing)       Start:  03/05/24    Expected End:  04/02/24                               Additional Comments:    HANKINS collaborated with patients nurse and charge nurse throughout the day to provide the appropriate support and encouragement to attend groups. Pt up on unit when HANKINS left last  group of the day. All needs met.

## 2024-03-13 NOTE — PROGRESS NOTES
"Donald Mcknight is a 73 y.o. year old male patient who is on [facility] admission day 10.      Subjective   Donald Mcknight is a 73 y.o. year old male patient who was personally seen and interviewed, and discussed in morning team rounds. The patient was interviewed in his room alone (interviewed laying in bed, awake and alert), and was easily engaged and cooperative. This afternoon, Donald reports feeling \"good\" and currently rates his depression at a 6 out of 10. No current suicidal ideation or plans were elicited. He also rates his anxiety at a 7 out of 10. No overt hallucinations or paranoia were endorsed or noted.   Donald slept 8.5 hour last night (broken).        Objective   Mental Status Exam:   General: Appropriately groomed and dressed in hospital attire, laying in bed, awake and alert.   Appearance: Appears stated age.   Attitude: Calm, cooperative.   Behavior: Good eye contact.   Motor Activity: No agitation or retardation. No EPS/TD. Impaired gait and station. Normal muscle tone and bulk.   Speech: Regular rate, rhythm, volume and tone, spontaneous, fluent. Non-pressured. Occasionally poor articulation.   Mood: \"Good\"   Affect: Pleasant.   Thought Process: Mostly organized, and goal directed.   Thought Content: Does not currently endorse suicidal ideation or any suicide plans.   Does not endorse homicidal ideation.  No overt delusions or paranoia elicited.    Thought Perception: Does not endorse auditory or visual hallucinations (see HPI).   Cognition: Alert, oriented x 3 currently. Adequate fund of knowledge. Some deficit in recent and remote memory. Some deficits in attention and   Concentration. Intact language.  MoCA (03-) = 14 out of 30 (Moderate Cognitive Impairment).   Insight: Poor-to-fair, as patient recognizes symptoms of  illness and need for recommended treatments.    Judgment: Poor-to-fair, as patient can make reasonable decisions about ordinary activities of daily living and necessary " medical care recommendations.       LABS:  No results found for this or any previous visit (from the past 24 hour(s)).       Last Recorded Vitals  Visit Vitals  /76 (BP Location: Right arm, Patient Position: Standing)   Pulse 78   Temp 36.5 °C (97.7 °F) (Temporal)   Resp 18        Intake/Output last 3 Shifts:  No intake/output data recorded.    Relevant Results  Scheduled medications  aspirin, 81 mg, oral, Daily  atorvastatin, 80 mg, oral, Nightly  calcium carbonate, 1,000 mg, oral, Daily  carvedilol, 12.5 mg, oral, BID  divalproex, 125 mg, oral, BID  famotidine, 20 mg, oral, Daily  FLUoxetine, 20 mg, oral, Daily  lidocaine, 1 patch, transdermal, Daily  lisinopril, 10 mg, oral, Daily  mirtazapine, 15 mg, oral, Nightly  nystatin, , Topical, BID  pantoprazole, 40 mg, oral, Daily before breakfast  perflutren lipid microspheres, 0.5-10 mL of dilution, intravenous, Once in imaging      Continuous medications     PRN medications  PRN medications: acetaminophen, albuterol, alum-mag hydroxide-simeth, hydrOXYzine pamoate, ketorolac, melatonin, OLANZapine **OR** OLANZapine, oxygen, psyllium               Assessment/Plan   1) Other Specified Depressive Disorder       Plan: 1) Fluoxetine 20 mg Qdaily (continue)                2) trial Mirtazapine 7.5 -> 15 mg at bedtime (sleep)     Discussed potential risks, benefits, and alternatives to medications with patient, who consented to the above medications.     2) Major Neurocognitive Disorder       Plan: 1) MoCA (03-) = 14 out of 30 (Moderate Cognitive Impairment).    3) Delirium, acute - resolved       Plan: 1) Discontinue trial Depakote 250 -> 125 > 0.0 mg Q17:00 and Q21:00     4) HTN       Plan: 1) IM service to follow and manage.     5) COPD       Plan: 1) IM service to follow and manage.     6) ARTEMIO       Plan: 1) IM service to follow and manage.     7) Diverticulitis       Plan: 1) IM service to follow and manage.     8) GERD       Plan: 1) IM service to follow  and manage.    9) Right hip pain       Plan: 1) IM service to follow and manage.            Palomo Tiwari MD

## 2024-03-13 NOTE — SIGNIFICANT EVENT
03/13/24 1217   Discharge Planning   Living Arrangements Spouse/significant other;Children   Support Systems Spouse/significant other;Children   Assistance Needed needs 24/7 supervised, total care;   Type of Residence Private residence   Who is requesting discharge planning? Provider   Type of Post Acute Facility Services Skilled nursing   Patient expects to be discharged to: skilled nursing home care at a [facility] fac[facility].   Does the patient need discharge transport arranged? Yes   RoundTrip coordination needed? Yes   Has discharge transport been arranged? No  (no date of discharge agreed upon yet.)   Patient Choice   Provider Choice list and CMS website (https://medicare.gov/care-compare#search) for post-acute Quality and Resource Measure Data were provided and reviewed with: Family;Patient;Other (Comment)  ([facility] Provider list given to patient and wife to make choice.)   Patient / Family choosing to utilize agency / facility established prior to hospitalization No     Spoke to Elsy Olson [facility] jo[facility], p. [phone]; she stated a [facility] form needs to be completed and emailed back along with clinicals to support skilled nursing care need; completed and emailed VA GEC (Geriatric and Extended Care Referral form) and latest updated clinicals (nursing and MD notes, H&P, med list, and latest PT, OT, and Respiratory care notes); Janette stated their VA physician will need to review the clinicals to approve the VA Precert, which could take a couple of days;  After VA Precert is approved, accepting VA Contracted Facility can accept; Waiting on approved PASSR (submitted through [employer] yesterday); Per [facility] has accepted pending VA Precert;  Will ask them to hold the bed pending VA Precert approval. Joi to follow.

## 2024-03-13 NOTE — NURSING NOTE
"Donald appeared in good spirits tonight, was able to laugh more and did not look as tense as he appeared previously.  He did complain of upper abdominal pain, as well as some hip pain, he rated it 8/10, so he did receive Ketorolak 10 mg, by mouth at approximately 2110.  He rated his anxiety as 10/10, depression as 7/10,he denies having any current SI/HI and denies experiencing any type of hallucination.  He said that he was \"really hungry tonight,\" so along with vanilla ice cream he had a couple of turkey sandwiches and ginger ale.  He reported a decrease in pain after getting medicated for pain of 8/10.  He was cooperative with all of his bedtime medications.    0214 - Donald has not fallen asleep yet this evening.  He is complaining of pain again, and I gave him Ketorolak 10 mg, by mouth and a snack and some ginger ale to drink.    0521 - Donald has only slept less than an hour this night.  "

## 2024-03-13 NOTE — GROUP NOTE
"Group Topic: Art Creative   Group Date: 3/13/2024  Start Time: 1615  End Time: 1715  Facilitators: ANUPAMA Dos SantosS   Department: [facility]  REHAB THERAPY VIRTUAL    Number of Participants: 5   Group Focus: art therapy, coping skills, and leisure skills  Treatment Modality: Other: Recreational Therapy   Interventions utilized were exploration and leisure development  Purpose: other: creative expression, leisure awareness, social engagement     Name: Donald Mcknight YOB: 1950   MR: [id number]      Facilitator: Recreational Therapist  Level of Participation: did not attend  Progress: None  Comments: Patients were gathered to participate in creating \"Sand Art Pictures\". This activity works on creative expression, fine motor skills, following directions, positive social interaction, and leisure awareness.   Plan: continue with services      "

## 2024-03-13 NOTE — GROUP NOTE
Group Topic: Dialectical Behavioral Therapy - Mindfulness   Group Date: 3/13/2024  Start Time: 1400  End Time: 1450  Facilitators: TAN Dos Santos; TNA Bolaños   Department: [facility] REHAB THERAPY VIRTUAL    Number of Participants: 5   Group Focus: coping skills and mindfulness  Treatment Modality: Recreation Therapy  Interventions utilized were other educational and support  Purpose: coping skills, insight or knowledge, and other: decrease judgmentalness, improve effectiveness, and focus on one thing at a time     Name: Donald Mcknight YOB: 1950   MR: [id number]      Facilitator: Recreational Therapist  Level of Participation: did not attend  Progress: Minimal  Comments: This session involved education on Dialectical Behavioral Therapy and the “HOW skills”. Participants were provided a handout and explained the core concepts of the skill. Patients were explained nonjudgmentally, one-mindfully, and effectively practices. We spent additional time focusing on decreasing judgementalness. Patients were provided an opportunity to share examples and ask questions about the information being presented throughout the session.     Patient remained in his room and in bed throughout the session.  Plan: continue with services

## 2024-03-13 NOTE — CARE PLAN
"The patient's goals for the shift include \"Eat some vanilla ice cream\"    The clinical goals for the shift include For patient to remain injury free and continue medication compliance    Donald has slept < 1 hour, was still at times and appeared to be quietly resting.  He received Ketorolak twice tonight from me, it appears to make him more comfortable.  "

## 2024-03-14 PROCEDURE — 99232 SBSQ HOSP IP/OBS MODERATE 35: CPT | Performed by: PSYCHIATRY & NEUROLOGY

## 2024-03-14 PROCEDURE — 2500000005 HC RX 250 GENERAL PHARMACY W/O HCPCS: Performed by: NURSE PRACTITIONER

## 2024-03-14 PROCEDURE — 2500000001 HC RX 250 WO HCPCS SELF ADMINISTERED DRUGS (ALT 637 FOR MEDICARE OP): Performed by: NURSE PRACTITIONER

## 2024-03-14 PROCEDURE — 2500000002 HC RX 250 W HCPCS SELF ADMINISTERED DRUGS (ALT 637 FOR MEDICARE OP, ALT 636 FOR OP/ED): Performed by: PSYCHIATRY & NEUROLOGY

## 2024-03-14 PROCEDURE — 94660 CPAP INITIATION&MGMT: CPT

## 2024-03-14 PROCEDURE — 1240000001 HC SEMI-PRIVATE BH ROOM DAILY

## 2024-03-14 PROCEDURE — 2500000001 HC RX 250 WO HCPCS SELF ADMINISTERED DRUGS (ALT 637 FOR MEDICARE OP): Performed by: PSYCHIATRY & NEUROLOGY

## 2024-03-14 RX ORDER — ALBUTEROL SULFATE 0.83 MG/ML
2.5 SOLUTION RESPIRATORY (INHALATION) EVERY 2 HOUR PRN
Status: DISCONTINUED | OUTPATIENT
Start: 2024-03-14 | End: 2024-03-20 | Stop reason: HOSPADM

## 2024-03-14 RX ADMIN — DIVALPROEX SODIUM 125 MG: 125 TABLET, DELAYED RELEASE ORAL at 17:14

## 2024-03-14 RX ADMIN — NYSTATIN: 100000 OINTMENT TOPICAL at 21:14

## 2024-03-14 RX ADMIN — MIRTAZAPINE 15 MG: 15 TABLET, FILM COATED ORAL at 21:13

## 2024-03-14 RX ADMIN — OLANZAPINE 5 MG: 5 TABLET, FILM COATED ORAL at 03:05

## 2024-03-14 RX ADMIN — FLUOXETINE HYDROCHLORIDE 20 MG: 20 CAPSULE ORAL at 08:27

## 2024-03-14 RX ADMIN — PANTOPRAZOLE SODIUM 40 MG: 40 TABLET, DELAYED RELEASE ORAL at 06:04

## 2024-03-14 RX ADMIN — LIDOCAINE 1 PATCH: 4 PATCH TOPICAL at 08:26

## 2024-03-14 RX ADMIN — CALCIUM CARBONATE (ANTACID) CHEW TAB 500 MG 1000 MG: 500 CHEW TAB at 08:27

## 2024-03-14 RX ADMIN — DIVALPROEX SODIUM 125 MG: 125 TABLET, DELAYED RELEASE ORAL at 06:04

## 2024-03-14 RX ADMIN — CARVEDILOL 12.5 MG: 12.5 TABLET, FILM COATED ORAL at 08:27

## 2024-03-14 RX ADMIN — ACETAMINOPHEN 650 MG: 325 TABLET ORAL at 08:29

## 2024-03-14 RX ADMIN — ATORVASTATIN CALCIUM 80 MG: 80 TABLET, FILM COATED ORAL at 21:13

## 2024-03-14 RX ADMIN — HYDROXYZINE PAMOATE 25 MG: 25 CAPSULE ORAL at 03:05

## 2024-03-14 RX ADMIN — FAMOTIDINE 20 MG: 20 TABLET ORAL at 08:27

## 2024-03-14 RX ADMIN — ASPIRIN 81 MG: 81 TABLET, COATED ORAL at 08:27

## 2024-03-14 RX ADMIN — Medication 5 MG: at 03:05

## 2024-03-14 RX ADMIN — LISINOPRIL 10 MG: 5 TABLET ORAL at 08:27

## 2024-03-14 RX ADMIN — NYSTATIN: 100000 OINTMENT TOPICAL at 08:27

## 2024-03-14 RX ADMIN — CARVEDILOL 12.5 MG: 12.5 TABLET, FILM COATED ORAL at 21:13

## 2024-03-14 RX ADMIN — HYDROXYZINE PAMOATE 25 MG: 25 CAPSULE ORAL at 21:14

## 2024-03-14 RX ADMIN — ACETAMINOPHEN 650 MG: 325 TABLET ORAL at 03:05

## 2024-03-14 ASSESSMENT — PAIN SCALES - PAIN ASSESSMENT IN ADVANCED DEMENTIA (PAINAD)
BREATHING: NORMAL
FACIALEXPRESSION: SMILING OR INEXPRESSIVE
TOTALSCORE: 3
CONSOLABILITY: DISTRACTED OR REASSURED BY VOICE/TOUCH
NEGVOCALIZATION: OCCASIONAL MOAN/GROAN, LOW SPEECH, NEGATIVE/DISAPPROVING QUALITY
BODYLANGUAGE: TENSE, DISTRESSED PACING, FIDGETING

## 2024-03-14 ASSESSMENT — PAIN SCALES - WONG BAKER: WONGBAKER_NUMERICALRESPONSE: HURTS LITTLE BIT

## 2024-03-14 ASSESSMENT — PAIN - FUNCTIONAL ASSESSMENT
PAIN_FUNCTIONAL_ASSESSMENT: 0-10
PAIN_FUNCTIONAL_ASSESSMENT: 0-10

## 2024-03-14 ASSESSMENT — PAIN SCALES - GENERAL
PAINLEVEL_OUTOF10: 6
PAINLEVEL_OUTOF10: 6
PAINLEVEL_OUTOF10: 4

## 2024-03-14 NOTE — PROGRESS NOTES
"Occupational Therapy     REHAB Therapy Assessment & Treatment    Patient Name: Donald Mcknight  MRN: [id number]  Today's Date: 3/14/2024      Activity Assessment:     Mindfulness and Yoga/Stretching Group: [id number] Coping and Safe Place Group: [id number]  Pet Therapy as a Coping Tool Group: [id number]    0/3 Groups attended/ HANKINS Tx session attempt made at 1332pm    Pt remains in bed, yelling out most times and physically unable to attend offered groups d/t agitation with getting OOB. Staff reports \"we were able to get him out of the bed but he got mad and yelling at us until we put him back\" Tx session attempt made with PTA to increase functional outcomes and safety however, upon arrival, staff had just gotten pt back to bed, pt agitated with staffs attempt and pt's wife present at that time to visit with him. Tx session deferred at that time d/t same. D/t pt's continued decline in cognition, impaired direction following and limited physical effort, pt would benefit from 24 hour Supervision/assistance in collaboration with the evaluating OTR. Pt would benefit from continued OT services in order to improve overall self-esteem, personal confidence and supports with increased awareness for safe transition location at discharge.                          OT Goals       Pt will demo ADL routine and meaningful daily activities with Marina using modifications as needed  (Not Progressing)       Start:  03/05/24    Expected End:  04/02/24            Patient will attend to therapeutic task for 10 minutes with minimum verbal cues demonstrating improved attention and participation in daily activities.  (Not Progressing)       Start:  03/05/24    Expected End:  04/02/24            Pt will demo functional transfers to/ from EOB, chair and commode with Marina and LRD (Progressing)       Start:  03/05/24    Expected End:  04/02/24            Pt will ID/ utilize 1-2 ways to increase balance of activity/ re-involve self in " functional daily routines/roles prior to discharge.   (Not Progressing)       Start:  03/05/24    Expected End:  04/02/24            Pt will explore and ID 1-2 strategies to manage stressors/symptoms of illness/ grief more effectively prior to discharge.   (Not Progressing)       Start:  03/05/24    Expected End:  04/02/24                               Additional Comments:  HANKINS collaborated with patients nurse and charge nurse throughout the day to provide the appropriate support and encouragement to attend groups. Pt up on unit when HANKINS left last group of the day. All needs met.

## 2024-03-14 NOTE — CARE PLAN
"The patient's goals for the shift include \"I'm gettin' outta here tonight!\"    The clinical goals for the shift include treatment compliant    Over the shift, the patient did not make progress toward the following goals. Barriers to progression include acuteness of illness. Recommendations to address these barriers include positive reinforcement .    Problem: Sensory Perceptual Alteration as Evidenced by  Goal: Cooperates with admission process  Outcome: Progressing  Goal: Patient/Family participate in treatment and discharge plans  Outcome: Progressing  Goal: Patient/Family verbalizes awareness of resources  Outcome: Progressing  Goal: Participates in unit activities  Outcome: Progressing  Goal: Discusses signs/symptoms of illness/treatment options  Outcome: Progressing  Goal: Initiates reality-based interactions  Outcome: Progressing  Goal: Able to discuss content of hallucinations/delusions  Outcome: Progressing  Goal: Notifies staff when experiencing hallucinations/delusions  Outcome: Progressing  Goal: Verbalizes reduction in hallucinations/delusions  Outcome: Progressing  Goal: Will not act on psychotic perception  Outcome: Progressing  Goal: Understands least restrictive measures  Outcome: Progressing  Goal: Free from restraint events  Outcome: Progressing     Problem: Altered Thought Processes as Evidenced by  Goal: STG - Desires improvement in ability to think and concentrate  Outcome: Progressing  Goal: STG - Participates in Occupational Therapy and other cognitive assessments  Outcome: Progressing     Problem: Potential for Harm to Self or Others  Goal: Cooperates with admission process  Outcome: Progressing  Goal: Participates in unit activities  Outcome: Progressing  Goal: Patient/Family participate in treatment and discharge plans  Outcome: Progressing  Goal: Identifies deescalation techniques  Outcome: Progressing  Goal: Understands least restrictive measures  Outcome: Progressing  Goal: Identifies " stressors that lead to harmful behaviors  Outcome: Progressing  Goal: Notifies staff when experiencing harmful thoughts toward self/others  Outcome: Progressing  Goal: Denies harm toward self or others  Outcome: Progressing  Goal: Free from restraint events  Outcome: Progressing     Problem: Educational/Scholastic Disruption  Goal: Meets educational requirements during hospitalization  Outcome: Progressing  Goal: Attends class without disruptive behavior  Outcome: Progressing  Goal: Completes daily assignments  Outcome: Progressing     Problem: Ineffective Coping  Goal: Cooperates with admission process  Outcome: Progressing  Goal: Identifies ineffective coping skills  Outcome: Progressing  Goal: Identifies healthy coping skills  Outcome: Progressing  Goal: Demonstrates healthy coping skills  Outcome: Progressing  Goal: Participates in unit activities  Outcome: Progressing  Goal: Patient/Family participate in treatment and discharge plans  Outcome: Progressing  Goal: Patient/Family verbalizes awareness of resources  Outcome: Progressing  Goal: Understands least restrictive measures  Outcome: Progressing  Goal: Free from restraint events  Outcome: Progressing     Problem: Alteration in Sleep  Goal: STG - Reports nightly sleep, duration, and quality  Outcome: Progressing  Goal: STG - Identifies sleep hygiene aids  Outcome: Progressing  Goal: STG - Informs staff if unable to sleep  Outcome: Progressing  Goal: STG - Attends breathing and relaxation group  Outcome: Progressing     Problem: Potential for Substance Withdrawal  Goal: Verbalizes signs/symptoms of withdrawal  Outcome: Progressing  Goal: Reports signs/symptoms of withdrawal  Outcome: Progressing  Goal: Free of withdrawal symptoms  Outcome: Progressing     Problem: Anxiety  Goal: Patient/family understands admission protocols  Outcome: Progressing  Goal: Attempts to manage anxiety with help  Outcome: Progressing  Goal: Verbalizes ways to manage  anxiety  Outcome: Progressing  Goal: Implements measures to reduce anxiety  Outcome: Progressing  Goal: Free from restraint events  Outcome: Progressing     Problem: Self Care Deficit  Goal: STG - Patient completes hygiene  Outcome: Progressing  Goal: Increase group attendance  Outcome: Progressing  Goal: Accepts need for medications  Outcome: Progressing  Goal: STG - Goes to and eats meals independently in dining room 100% of time  Outcome: Progressing     Problem: Defensive Coping  Goal: Cooperates with admission process  Outcome: Progressing  Goal: Identifies reckless/dangerous behavior  Outcome: Progressing  Goal: Identifies stressors that lead to reckless/dangerous behavior  Outcome: Progressing  Goal: Discusses and identifies healthy coping skills  Outcome: Progressing  Goal: Demonstrates healthy coping skills  Outcome: Progressing  Goal: Identifies appropriate social interaction  Outcome: Progressing  Goal: Demonstrates appropriate social interactions  Outcome: Progressing  Goal: Patient/Family verbalizes awareness of resources  Outcome: Progressing  Goal: Discusses signs/symptoms of illness/treatment options  Outcome: Progressing  Goal: Patient/Family participate in treatment and discharge plans  Outcome: Progressing  Goal: Understands least restrictive measures  Outcome: Progressing  Goal: Free from restraint events  Outcome: Progressing     Problem: Pain  Goal: My pain/discomfort is manageable  Outcome: Progressing

## 2024-03-14 NOTE — NURSING NOTE
"Pt was in bed most of the day we got him up and in the broda, for a short period of time , Pt eat lunch out on the unit then wanted to go straight back to bed, explained to him that his wife was coming and I wanted her to be able to visit pt stated\" I want to go back and get in bed\" Pt is unwilling to help in his care and is declining. Anxiety 7/10 depression 8/10. Pt denied SI/HI and A/V/H. Safety maintained. Med compliant. Pt contracted for safety with this staff at this time.   "

## 2024-03-14 NOTE — GROUP NOTE
"Group Topic: Leisure Skills   Group Date: 3/14/2024  Start Time: 1600  End Time: 1700  Facilitators: ANUPAMA GaleanoS   Department: [facility] REHAB THERAPY VIRTUAL    Number of Participants: 6   Group Focus: concentration, leisure skills, and social skills  Treatment Modality: Other: Recreation Therapy  Interventions utilized were leisure development, mental fitness, and problem solving  Purpose: insight or knowledgecognitive stimulation, enhance mood, social stimulation, increase attention span, leisure awareness    Name: Donald Mcknight YOB: 1950   MR: [id number]      Facilitator: Recreational Therapist  Level of Participation: did not attend  Progress: None  Comments: In this Rec. Therapy group session patients engaged in a leisure awareness activity providing mental stimulation and increased socialization. Patients played the game \"Catch Phrase\". This activity involved providing clues to guess the answer of words or phrases in specified categories. Patient's took turns either guessing or providing clues. All participants were provided directions, encouragement, and assistance if needed.     Plan: continue with services      "

## 2024-03-14 NOTE — PROGRESS NOTES
"Donald Mcknight is a 73 y.o. year old male patient who is on U admission day 11.      Subjective   Donald Mcknight is a 73 y.o. year old male patient who was personally seen and interviewed, and discussed in morning team rounds. The patient was interviewed in his room alone (interviewed laying in bed, awake and alert), and was easily engaged and cooperative. This noon-time, Donald reports feeling \"great\" and currently rates his depression at a 6-7 out of 10. No current suicidal ideation or plans were elicited. He also still rates his anxiety at a 7 out of 10. No overt hallucinations or paranoia were endorsed or noted.   Donald only slept 4 hour last night (broken).          Objective   Mental Status Exam:   General: Appropriately groomed and partially dressed in hospital attire, laying in bed, awake and alert.   Appearance: Appears stated age.   Attitude: Calm, cooperative.   Behavior: Good eye contact.   Motor Activity: No agitation or retardation. No EPS/TD. Impaired gait and station. Normal muscle tone and bulk.   Speech: Regular rate, rhythm, volume and tone, spontaneous, fluent. Non-pressured.   Mood: \"Great\"   Affect: Pleasant.   Thought Process: Mostly organized, and goal directed.   Thought Content: Does not currently endorse suicidal ideation or any suicide plans.   Does not endorse homicidal ideation.  No overt delusions or paranoia elicited.    Thought Perception: Does not endorse auditory or visual hallucinations (see HPI).   Cognition: Alert, oriented x 3 currently. Adequate fund of knowledge. Some deficit in recent and remote memory. Some deficits in attention and   Concentration. Intact language.  MoCA (03-) = 14 out of 30 (Moderate Cognitive Impairment).   Insight: Poor-to-fair, as patient recognizes symptoms of  illness and need for recommended treatments.    Judgment: Poor-to-fair, as patient can make reasonable decisions about ordinary activities of daily living and necessary medical care " recommendations.       LABS:  No results found for this or any previous visit (from the past 24 hour(s)).       Last Recorded Vitals  Visit Vitals  /73 (BP Location: Right arm, Patient Position: Lying)   Pulse 93   Temp 36.8 °C (98.2 °F) (Temporal)   Resp 18        Intake/Output last 3 Shifts:  No intake/output data recorded.    Relevant Results  Scheduled medications  aspirin, 81 mg, oral, Daily  atorvastatin, 80 mg, oral, Nightly  calcium carbonate, 1,000 mg, oral, Daily  carvedilol, 12.5 mg, oral, BID  divalproex, 125 mg, oral, BID  famotidine, 20 mg, oral, Daily  FLUoxetine, 20 mg, oral, Daily  lidocaine, 1 patch, transdermal, Daily  lisinopril, 10 mg, oral, Daily  mirtazapine, 15 mg, oral, Nightly  nystatin, , Topical, BID  pantoprazole, 40 mg, oral, Daily before breakfast  perflutren lipid microspheres, 0.5-10 mL of dilution, intravenous, Once in imaging      Continuous medications     PRN medications  PRN medications: acetaminophen, albuterol, alum-mag hydroxide-simeth, hydrOXYzine pamoate, melatonin, OLANZapine **OR** OLANZapine, oxygen, psyllium               Assessment/Plan   1) Other Specified Depressive Disorder       Plan: 1) Fluoxetine 20 mg Qdaily (continue)                2) trial Mirtazapine 7.5 -> 15 mg at bedtime (sleep)     Discussed potential risks, benefits, and alternatives to medications with patient, who consented to the above medications.     2) Major Neurocognitive Disorder       Plan: 1) MoCA (03-) = 14 out of 30 (Moderate Cognitive Impairment).    3) Delirium, acute - resolved       Plan: 1) Discontinue trial Depakote 250 -> 125 > 0.0 mg Q17:00 and Q21:00     4) HTN       Plan: 1) IM service to follow and manage.     5) COPD       Plan: 1) IM service to follow and manage.     6) ARTEMIO       Plan: 1) IM service to follow and manage.     7) Diverticulitis       Plan: 1) IM service to follow and manage.     8) GERD       Plan: 1) IM service to follow and manage.    9) Right hip  pain       Plan: 1) IM service to follow and manage.            Palomo Tiwari MD

## 2024-03-14 NOTE — GROUP NOTE
Group Topic: Symptom Management   Group Date: 3/14/2024  Start Time: 1400  End Time: 6557  Facilitators: TAN Galeano; TAN Bolaños   Department: [facility] REHAB THERAPY VIRTUAL    Number of Participants: 8   Group Focus: illness education and recovery concepts  Treatment Modality: Recreation Therapy  Interventions utilized were clarification, problem solving, and support  Purpose: coping skills, insight or knowledge, and other: understand concepts of recovery    Name: Donald Mcknight YOB: 1950   MR: [id number]      Facilitator: Recreational Therapist  Level of Participation: did not attend  Progress: Minimal  Comments: Participants were provided an activity that focused on “concepts of recovery”. This session included cards with ideas like attitude, physical health, negative thoughts, motivation, daily routine, communication, and many more were handed to each patient. Then the group processed each concept by sharing how they relate, understand, and why that word/phrase is important to a recovery process. Participants were provided an opportunity to share personal thoughts and encouraged to support one another. The group members were also provided a handout which encouraged them to make a “30 day commitment”. This handout provided some example coping skills to use throughout the month and left some days blank for the patients to fill in.    Plan: continue with services

## 2024-03-14 NOTE — NURSING NOTE
Donald remained awake and resting in bed, in order to change his linens, staff got him to stand at the side of the bed, pivot to a chair, in his haste to lay back down in bed, he slid to the floor, guided by staff, then I called two Police Officers who essentially were able to stand him up so he could sit on the side of the bed.  He continues to be incontinent of a large amount of urine and a small amount of soft stool.  He rated both his anxiety and depression as 10/10, he denies having any SI/HI and denies experiencing any hallucinations, but calls out to people frequently and has brief conversations with them.  He was compliant with all of his bedtime medications.  He received Toradol 10 mg, by mouth earlier for complaint of pain 10/10.    0403 - Donald has slept at very short intervals, but continues to stay awake and can be heard talking to people he sees in his room.  He was incontinent of a very large amount of urine and a small amount of soft stool.    6279 - Donald has slept briefly at times.

## 2024-03-14 NOTE — PROGRESS NOTES
Physical Therapy                 Therapy Communication Note    Patient Name: Donald Mcknight  MRN: [id number]  Today's Date: 3/14/2024     Discipline: Physical Therapy    Missed Visit Reason: Missed Visit Reason: Other (Comment) (p.t. was up to chair & just returned to bed via satish per nurse.wife here & wants to visit. no tx.p.t. has not demonstrated progress towards goals. decline in all mobility noted. May need to recommend LTC vs MOD intensity PT based on p.t. progress &goals)    Missed Time: Cancel 3551    Comment:

## 2024-03-14 NOTE — CARE PLAN
"The patient's goals for the shift include \"I'm gettin' outta here tonight!\"    The clinical goals for the shift include For patient to remain injury free and to maintain medication compliance    Donald has slept at several short intervals throughout the night.  "

## 2024-03-15 PROBLEM — F32.A DEPRESSION WITH SUICIDAL IDEATION: Status: RESOLVED | Noted: 2024-03-03 | Resolved: 2024-03-15

## 2024-03-15 PROBLEM — R45.851 DEPRESSION WITH SUICIDAL IDEATION: Status: RESOLVED | Noted: 2024-03-03 | Resolved: 2024-03-15

## 2024-03-15 PROCEDURE — 2500000001 HC RX 250 WO HCPCS SELF ADMINISTERED DRUGS (ALT 637 FOR MEDICARE OP): Performed by: PSYCHIATRY & NEUROLOGY

## 2024-03-15 PROCEDURE — 2500000001 HC RX 250 WO HCPCS SELF ADMINISTERED DRUGS (ALT 637 FOR MEDICARE OP): Performed by: NURSE PRACTITIONER

## 2024-03-15 PROCEDURE — 2500000005 HC RX 250 GENERAL PHARMACY W/O HCPCS: Performed by: NURSE PRACTITIONER

## 2024-03-15 PROCEDURE — 1240000001 HC SEMI-PRIVATE BH ROOM DAILY

## 2024-03-15 PROCEDURE — 2500000002 HC RX 250 W HCPCS SELF ADMINISTERED DRUGS (ALT 637 FOR MEDICARE OP, ALT 636 FOR OP/ED): Performed by: PSYCHIATRY & NEUROLOGY

## 2024-03-15 PROCEDURE — 99232 SBSQ HOSP IP/OBS MODERATE 35: CPT | Performed by: PSYCHIATRY & NEUROLOGY

## 2024-03-15 RX ORDER — FLUOXETINE HYDROCHLORIDE 20 MG/1
20 CAPSULE ORAL DAILY
Qty: 30 CAPSULE | Refills: 0 | Status: SHIPPED | OUTPATIENT
Start: 2024-03-15 | End: 2024-04-14

## 2024-03-15 RX ORDER — ALBUTEROL SULFATE 0.83 MG/ML
2.5 SOLUTION RESPIRATORY (INHALATION) EVERY 2 HOUR PRN
Qty: 75 ML | Refills: 11 | Status: SHIPPED | OUTPATIENT
Start: 2024-03-15

## 2024-03-15 RX ORDER — ATORVASTATIN CALCIUM 80 MG/1
80 TABLET, FILM COATED ORAL NIGHTLY
Qty: 30 TABLET | Refills: 0 | Status: SHIPPED | OUTPATIENT
Start: 2024-03-15 | End: 2024-04-14

## 2024-03-15 RX ORDER — NYSTATIN 100000 U/G
OINTMENT TOPICAL 2 TIMES DAILY
Qty: 10 G | Refills: 0 | Status: SHIPPED | OUTPATIENT
Start: 2024-03-15 | End: 2024-04-14

## 2024-03-15 RX ORDER — PANTOPRAZOLE SODIUM 40 MG/1
40 TABLET, DELAYED RELEASE ORAL
Qty: 30 TABLET | Refills: 0 | Status: SHIPPED | OUTPATIENT
Start: 2024-03-16 | End: 2024-04-15

## 2024-03-15 RX ORDER — CARVEDILOL 12.5 MG/1
12.5 TABLET ORAL 2 TIMES DAILY
Qty: 60 TABLET | Refills: 0 | Status: SHIPPED | OUTPATIENT
Start: 2024-03-15 | End: 2024-04-14

## 2024-03-15 RX ORDER — CALCIUM CARBONATE 200(500)MG
1000 TABLET,CHEWABLE ORAL DAILY
Qty: 60 TABLET | Refills: 0 | Status: SHIPPED | OUTPATIENT
Start: 2024-03-16 | End: 2024-04-15

## 2024-03-15 RX ORDER — FAMOTIDINE 20 MG/1
20 TABLET, FILM COATED ORAL DAILY
Qty: 30 TABLET | Refills: 0 | Status: SHIPPED | OUTPATIENT
Start: 2024-03-16 | End: 2024-04-15

## 2024-03-15 RX ORDER — MIRTAZAPINE 15 MG/1
15 TABLET, FILM COATED ORAL NIGHTLY
Qty: 30 TABLET | Refills: 0 | Status: SHIPPED | OUTPATIENT
Start: 2024-03-15 | End: 2024-03-16 | Stop reason: HOSPADM

## 2024-03-15 RX ORDER — ASPIRIN 81 MG/1
81 TABLET ORAL DAILY
Qty: 30 TABLET | Refills: 0 | Status: SHIPPED | OUTPATIENT
Start: 2024-03-16 | End: 2024-04-15

## 2024-03-15 RX ORDER — LIDOCAINE 560 MG/1
1 PATCH PERCUTANEOUS; TOPICAL; TRANSDERMAL DAILY
Qty: 30 PATCH | Refills: 0 | Status: SHIPPED | OUTPATIENT
Start: 2024-03-16

## 2024-03-15 RX ORDER — LISINOPRIL 10 MG/1
10 TABLET ORAL DAILY
Qty: 30 TABLET | Refills: 0 | Status: SHIPPED | OUTPATIENT
Start: 2024-03-16 | End: 2024-04-15

## 2024-03-15 RX ADMIN — LIDOCAINE 1 PATCH: 4 PATCH TOPICAL at 08:40

## 2024-03-15 RX ADMIN — ACETAMINOPHEN 650 MG: 325 TABLET ORAL at 21:44

## 2024-03-15 RX ADMIN — HYDROXYZINE PAMOATE 25 MG: 25 CAPSULE ORAL at 02:39

## 2024-03-15 RX ADMIN — NYSTATIN: 100000 OINTMENT TOPICAL at 08:39

## 2024-03-15 RX ADMIN — FAMOTIDINE 20 MG: 20 TABLET ORAL at 08:40

## 2024-03-15 RX ADMIN — Medication 5 MG: at 20:22

## 2024-03-15 RX ADMIN — ASPIRIN 81 MG: 81 TABLET, COATED ORAL at 08:40

## 2024-03-15 RX ADMIN — LISINOPRIL 10 MG: 5 TABLET ORAL at 08:40

## 2024-03-15 RX ADMIN — CARVEDILOL 12.5 MG: 12.5 TABLET, FILM COATED ORAL at 20:22

## 2024-03-15 RX ADMIN — MIRTAZAPINE 15 MG: 15 TABLET, FILM COATED ORAL at 20:22

## 2024-03-15 RX ADMIN — ACETAMINOPHEN 650 MG: 325 TABLET ORAL at 02:39

## 2024-03-15 RX ADMIN — ATORVASTATIN CALCIUM 80 MG: 80 TABLET, FILM COATED ORAL at 20:22

## 2024-03-15 RX ADMIN — DIVALPROEX SODIUM 125 MG: 125 TABLET, DELAYED RELEASE ORAL at 05:26

## 2024-03-15 RX ADMIN — PANTOPRAZOLE SODIUM 40 MG: 40 TABLET, DELAYED RELEASE ORAL at 06:18

## 2024-03-15 RX ADMIN — CALCIUM CARBONATE (ANTACID) CHEW TAB 500 MG 1000 MG: 500 CHEW TAB at 08:39

## 2024-03-15 RX ADMIN — NYSTATIN: 100000 OINTMENT TOPICAL at 20:22

## 2024-03-15 RX ADMIN — FLUOXETINE HYDROCHLORIDE 20 MG: 20 CAPSULE ORAL at 08:40

## 2024-03-15 RX ADMIN — CARVEDILOL 12.5 MG: 12.5 TABLET, FILM COATED ORAL at 08:40

## 2024-03-15 RX ADMIN — ACETAMINOPHEN 650 MG: 325 TABLET ORAL at 08:41

## 2024-03-15 ASSESSMENT — PAIN SCALES - PAIN ASSESSMENT IN ADVANCED DEMENTIA (PAINAD)
BODYLANGUAGE: RELAXED
CONSOLABILITY: NO NEED TO CONSOLE
BREATHING: NORMAL
CONSOLABILITY: NO NEED TO CONSOLE
CONSOLABILITY: NO NEED TO CONSOLE
FACIALEXPRESSION: SMILING OR INEXPRESSIVE
FACIALEXPRESSION: SMILING OR INEXPRESSIVE
BREATHING: NORMAL
TOTALSCORE: 0
BREATHING: NORMAL
FACIALEXPRESSION: SMILING OR INEXPRESSIVE
TOTALSCORE: 0
BODYLANGUAGE: RELAXED
BODYLANGUAGE: RELAXED
TOTALSCORE: 0

## 2024-03-15 ASSESSMENT — PAIN - FUNCTIONAL ASSESSMENT
PAIN_FUNCTIONAL_ASSESSMENT: 0-10

## 2024-03-15 ASSESSMENT — PAIN SCALES - WONG BAKER
WONGBAKER_NUMERICALRESPONSE: HURTS LITTLE BIT
WONGBAKER_NUMERICALRESPONSE: HURTS LITTLE BIT
WONGBAKER_NUMERICALRESPONSE: NO HURT
WONGBAKER_NUMERICALRESPONSE: HURTS LITTLE BIT

## 2024-03-15 ASSESSMENT — PAIN SCALES - GENERAL
PAINLEVEL_OUTOF10: 7
PAINLEVEL_OUTOF10: 8
PAINLEVEL_OUTOF10: 7
PAINLEVEL_OUTOF10: 6

## 2024-03-15 ASSESSMENT — PAIN DESCRIPTION - LOCATION
LOCATION: SHOULDER
LOCATION: SHOULDER

## 2024-03-15 ASSESSMENT — PAIN DESCRIPTION - DESCRIPTORS
DESCRIPTORS: ACHING;DISCOMFORT
DESCRIPTORS: ACHING;DISCOMFORT

## 2024-03-15 ASSESSMENT — PAIN DESCRIPTION - ORIENTATION: ORIENTATION: LEFT

## 2024-03-15 NOTE — NURSING NOTE
"Pt interview in the patients room  Pt is loud and cooperative  Pt stated his goal \"get sleep\"  his strength \"I play baseball\" and his coping skill \" I watch TV\"   Pt rated his anxiety 5/10  depression 4/10  and pain all over 4/10  Pt is cooperative and easy to talk with  He is appropriate with his answers to the questions at this time   "

## 2024-03-15 NOTE — NURSING NOTE
"Pt was in bed sleeping most of the day when doing his assessment this morning Pt stated \" I had a rough night and Im really tired, I just want to sleep Im not hungry either\" Anxiety 8/10 depression 0/10. Pt denied SI/HI and A/V/H. Safety maintained. Med compliant. Pt contracted for safety with this staff at this time.   "

## 2024-03-15 NOTE — NURSING NOTE
Pt took his night time medications  He was incontinent of urina and stool  we cleaned him up  He slept and off and had an uneventful night

## 2024-03-15 NOTE — CARE PLAN
"The patient's goals for the shift include \"get some sleep\"    The clinical goals for the shift include treatment compliant    Over the shift, the patient did not make progress toward the following goals. Barriers to progression include acuteness of illness. Recommendations to address these barriers include positive reinforcement .      Problem: Sensory Perceptual Alteration as Evidenced by  Goal: Cooperates with admission process  Outcome: Progressing  Goal: Patient/Family participate in treatment and discharge plans  Outcome: Progressing  Goal: Patient/Family verbalizes awareness of resources  Outcome: Progressing  Goal: Participates in unit activities  Outcome: Progressing  Goal: Discusses signs/symptoms of illness/treatment options  Outcome: Progressing  Goal: Initiates reality-based interactions  Outcome: Progressing  Goal: Able to discuss content of hallucinations/delusions  Outcome: Progressing  Goal: Notifies staff when experiencing hallucinations/delusions  Outcome: Progressing  Goal: Verbalizes reduction in hallucinations/delusions  Outcome: Progressing  Goal: Will not act on psychotic perception  Outcome: Progressing  Goal: Understands least restrictive measures  Outcome: Progressing  Goal: Free from restraint events  Outcome: Progressing     Problem: Altered Thought Processes as Evidenced by  Goal: STG - Desires improvement in ability to think and concentrate  Outcome: Progressing  Goal: STG - Participates in Occupational Therapy and other cognitive assessments  Outcome: Progressing     Problem: Potential for Harm to Self or Others  Goal: Cooperates with admission process  Outcome: Progressing  Goal: Participates in unit activities  Outcome: Progressing  Goal: Patient/Family participate in treatment and discharge plans  Outcome: Progressing  Goal: Identifies deescalation techniques  Outcome: Progressing  Goal: Understands least restrictive measures  Outcome: Progressing  Goal: Identifies stressors that " lead to harmful behaviors  Outcome: Progressing  Goal: Notifies staff when experiencing harmful thoughts toward self/others  Outcome: Progressing  Goal: Denies harm toward self or others  Outcome: Progressing  Goal: Free from restraint events  Outcome: Progressing     Problem: Educational/Scholastic Disruption  Goal: Meets educational requirements during hospitalization  Outcome: Progressing  Goal: Attends class without disruptive behavior  Outcome: Progressing  Goal: Completes daily assignments  Outcome: Progressing     Problem: Ineffective Coping  Goal: Cooperates with admission process  Outcome: Progressing  Goal: Identifies ineffective coping skills  Outcome: Progressing  Goal: Identifies healthy coping skills  Outcome: Progressing  Goal: Demonstrates healthy coping skills  Outcome: Progressing  Goal: Participates in unit activities  Outcome: Progressing  Goal: Patient/Family participate in treatment and discharge plans  Outcome: Progressing  Goal: Patient/Family verbalizes awareness of resources  Outcome: Progressing  Goal: Understands least restrictive measures  Outcome: Progressing  Goal: Free from restraint events  Outcome: Progressing     Problem: Alteration in Sleep  Goal: STG - Reports nightly sleep, duration, and quality  Outcome: Progressing  Goal: STG - Identifies sleep hygiene aids  Outcome: Progressing  Goal: STG - Informs staff if unable to sleep  Outcome: Progressing  Goal: STG - Attends breathing and relaxation group  Outcome: Progressing     Problem: Potential for Substance Withdrawal  Goal: Verbalizes signs/symptoms of withdrawal  Outcome: Progressing  Goal: Reports signs/symptoms of withdrawal  Outcome: Progressing  Goal: Free of withdrawal symptoms  Outcome: Progressing     Problem: Anxiety  Goal: Patient/family understands admission protocols  Outcome: Progressing  Goal: Attempts to manage anxiety with help  Outcome: Progressing  Goal: Verbalizes ways to manage anxiety  Outcome:  Progressing  Goal: Implements measures to reduce anxiety  Outcome: Progressing  Goal: Free from restraint events  Outcome: Progressing     Problem: Self Care Deficit  Goal: STG - Patient completes hygiene  Outcome: Progressing  Goal: Increase group attendance  Outcome: Progressing  Goal: Accepts need for medications  Outcome: Progressing  Goal: STG - Goes to and eats meals independently in dining room 100% of time  Outcome: Progressing     Problem: Defensive Coping  Goal: Cooperates with admission process  Outcome: Progressing  Goal: Identifies reckless/dangerous behavior  Outcome: Progressing  Goal: Identifies stressors that lead to reckless/dangerous behavior  Outcome: Progressing  Goal: Discusses and identifies healthy coping skills  Outcome: Progressing  Goal: Demonstrates healthy coping skills  Outcome: Progressing  Goal: Identifies appropriate social interaction  Outcome: Progressing  Goal: Demonstrates appropriate social interactions  Outcome: Progressing  Goal: Patient/Family verbalizes awareness of resources  Outcome: Progressing  Goal: Discusses signs/symptoms of illness/treatment options  Outcome: Progressing  Goal: Patient/Family participate in treatment and discharge plans  Outcome: Progressing  Goal: Understands least restrictive measures  Outcome: Progressing  Goal: Free from restraint events  Outcome: Progressing     Problem: Pain  Goal: My pain/discomfort is manageable  Outcome: Progressing

## 2024-03-15 NOTE — SIGNIFICANT EVENT
03/15/24 0923   Discharge Planning   Living Arrangements Spouse/significant other;Children   Support Systems Spouse/significant other;Children   Assistance Needed needs placement in skilled nursing facility at discharge.   Type of Residence Private residence   Who is requesting discharge planning? Provider   Type of Post Acute Facility Services Skilled nursing   Patient expects to be discharged to: either [facility] or [facility] skilled nursing facility: family must choose depending on decision from [facility] which should come through today.   Does the patient need discharge transport arranged? Yes   RoundTrip coordination needed? Yes   Has discharge transport been arranged?   (discharge date has not been confirmed yet.)   Patient Choice   Provider Choice list and CMS website (https://medicare.gov/care-compare#search) for post-acute Quality and Resource Measure Data were provided and reviewed with: Other (Comment)  (list of [facility] contracted agencies reviewed with family and they gave 3 choices.)   Patient / Family choosing to utilize agency / facility established prior to hospitalization No     Sw met with wife and patient yesterday, confirmed this sw sent information to [facility] skilled nursing facility on 03/14, per wife and patient request; as [facility]14[facility];  sw sent request this morning for update as to acceptance as [facility] needs accepting facility to push through their Precert asap;  Joi received approved PASSR today also.  To update asap after hearing back from [facility]; Plan A: to [facility] skilled nursing; Plan B: [facility] Skilled Nursing; joi to follow.     Addendum: 12:25pm.  Joi received no response from [facility] [place] (formerly [facility]) on [facility], so this sw called their admissions dept. P. [phone], left voice mail requesting update about acceptance or not asap. Joi to follow.     Adddendum: 1:57pm.  Joi received response: [facility] [place]  (formerly [facility] does not have an appropriate bed and have declined to accept patient.  Phoned wife, Sophy and updated her; Told her only 1 facility, [facility] has accepted; she requested we submit to [facility]; [facility] cannot approve precert until they have an accepted and chosen facility.  Sw to update.     Addendum: 2:46 pm.  [facility] is reviewing and wants to come out to do an onsite interview.  Sw requested time and date;  Wife called and now wants referral sent to [facility] in [place]; sw sent it and told wife they stated they are reviewing and should get back to this  asap. Told wife will keep her updated. Sw to follow.     Addendum: 3:48 pm.  [facility] declined to accept, stating they cannot accept at this time;  Phoned wife and informed her, she said she had to make some phone calls and will get back to me.  Sw asked her about [facility], which is holding a bed, and she said she did not know, but needed to call this sw back.  Sw to follow.

## 2024-03-15 NOTE — PROGRESS NOTES
Occupational Therapy     REHAB Therapy Assessment & Treatment    Patient Name: Donald Mcknight  MRN: [id number]  Today's Date: 3/15/2024      Activity Assessment:   Creative Art and Relaxation Group: [id number]  Meaningful Sharing/ Self Esteem Group: [id number]  Problem Solving/ Leisure Skills Group: [id number]    0/3 Groups attended    Pt remains unable to physically attend groups d/t P cognition and follow through. Pt continues to present confused but pleasant upon arrival to room for offering of group activities. No HANKINS groups this date.  Pt would benefit from continued OT services in order to improve overall self-esteem, personal confidence and supports with increased awareness for safe transition location at discharge.                   OT Goals       Pt will demo ADL routine and meaningful daily activities with Marina using modifications as needed  (Not Progressing)       Start:  03/05/24    Expected End:  04/02/24            Patient will attend to therapeutic task for 10 minutes with minimum verbal cues demonstrating improved attention and participation in daily activities.  (Not Progressing)       Start:  03/05/24    Expected End:  04/02/24            Pt will demo functional transfers to/ from EOB, chair and commode with Marina and LRD (Not Progressing)       Start:  03/05/24    Expected End:  04/02/24            Pt will ID/ utilize 1-2 ways to increase balance of activity/ re-involve self in functional daily routines/roles prior to discharge.   (Not Progressing)       Start:  03/05/24    Expected End:  04/02/24            Pt will explore and ID 1-2 strategies to manage stressors/symptoms of illness/ grief more effectively prior to discharge.   (Not Progressing)       Start:  03/05/24    Expected End:  04/02/24                               Additional Comments:    HANKINS collaborated with patients nurse and charge nurse throughout the day to provide the appropriate support and encouragement to attend groups. Pt up on  unit when HANKINS left last group of the day. All needs met.

## 2024-03-15 NOTE — GROUP NOTE
Group Topic: Leisure Skills   Group Date: 3/15/2024  Start Time: 1400  End Time: 1500  Facilitators: ANUPAMA GaleanoS   Department: [facility] REHAB THERAPY VIRTUAL    Number of Participants: 9   Group Focus: concentration, leisure skills, and social skills  Treatment Modality: Other: Recreation Therapy  Interventions utilized were exploration and leisure development  Purpose: other: cognitive focus, leisure awareness, increase socialization, enhance mood,     Name: Donald Mcknight YOB: 1950   MR: [id number]      Facilitator: Recreational Therapist  Level of Participation: did not attend  Progress: Moderate  Comments: Patients were gathered to participate in the game Apples to Apples, which is a game utilizing one's own perception. This activity works on cognitive skills, following directions, positive social interaction and promotes leisure awareness. No handout available.   Plan: continue with services

## 2024-03-15 NOTE — CARE PLAN
"The patient's goals for the shift include \"get sleep\"    The clinical goals for the shift include medication compliance    Over the shift, the patient did not make progress toward the following goals. Barriers to progression include lack of medication knowledge. Recommendations to address these barriers include more education on medications .    "

## 2024-03-16 PROCEDURE — 2500000001 HC RX 250 WO HCPCS SELF ADMINISTERED DRUGS (ALT 637 FOR MEDICARE OP): Performed by: NURSE PRACTITIONER

## 2024-03-16 PROCEDURE — 94660 CPAP INITIATION&MGMT: CPT

## 2024-03-16 PROCEDURE — 2500000002 HC RX 250 W HCPCS SELF ADMINISTERED DRUGS (ALT 637 FOR MEDICARE OP, ALT 636 FOR OP/ED): Performed by: PSYCHIATRY & NEUROLOGY

## 2024-03-16 PROCEDURE — 99232 SBSQ HOSP IP/OBS MODERATE 35: CPT | Performed by: PSYCHIATRY & NEUROLOGY

## 2024-03-16 PROCEDURE — 2500000001 HC RX 250 WO HCPCS SELF ADMINISTERED DRUGS (ALT 637 FOR MEDICARE OP): Performed by: PSYCHIATRY & NEUROLOGY

## 2024-03-16 PROCEDURE — 1240000001 HC SEMI-PRIVATE BH ROOM DAILY

## 2024-03-16 PROCEDURE — 2500000005 HC RX 250 GENERAL PHARMACY W/O HCPCS: Performed by: NURSE PRACTITIONER

## 2024-03-16 RX ORDER — TRAZODONE HYDROCHLORIDE 100 MG/1
100 TABLET ORAL NIGHTLY
Qty: 30 TABLET | Refills: 1 | Status: SHIPPED | OUTPATIENT
Start: 2024-03-16 | End: 2024-05-15

## 2024-03-16 RX ORDER — TRAZODONE HYDROCHLORIDE 100 MG/1
100 TABLET ORAL NIGHTLY
Status: DISCONTINUED | OUTPATIENT
Start: 2024-03-16 | End: 2024-03-20 | Stop reason: HOSPADM

## 2024-03-16 RX ADMIN — NYSTATIN: 100000 OINTMENT TOPICAL at 20:20

## 2024-03-16 RX ADMIN — Medication 5 MG: at 23:14

## 2024-03-16 RX ADMIN — PANTOPRAZOLE SODIUM 40 MG: 40 TABLET, DELAYED RELEASE ORAL at 06:12

## 2024-03-16 RX ADMIN — FLUOXETINE HYDROCHLORIDE 20 MG: 20 CAPSULE ORAL at 08:41

## 2024-03-16 RX ADMIN — NYSTATIN: 100000 OINTMENT TOPICAL at 08:39

## 2024-03-16 RX ADMIN — ATORVASTATIN CALCIUM 80 MG: 80 TABLET, FILM COATED ORAL at 20:19

## 2024-03-16 RX ADMIN — ASPIRIN 81 MG: 81 TABLET, COATED ORAL at 08:41

## 2024-03-16 RX ADMIN — CARVEDILOL 12.5 MG: 12.5 TABLET, FILM COATED ORAL at 20:19

## 2024-03-16 RX ADMIN — TRAZODONE HYDROCHLORIDE 100 MG: 100 TABLET ORAL at 20:20

## 2024-03-16 RX ADMIN — HYDROXYZINE PAMOATE 25 MG: 25 CAPSULE ORAL at 20:20

## 2024-03-16 RX ADMIN — LIDOCAINE 1 PATCH: 4 PATCH TOPICAL at 08:41

## 2024-03-16 RX ADMIN — ACETAMINOPHEN 650 MG: 325 TABLET ORAL at 20:19

## 2024-03-16 RX ADMIN — FAMOTIDINE 20 MG: 20 TABLET ORAL at 08:41

## 2024-03-16 RX ADMIN — LISINOPRIL 10 MG: 5 TABLET ORAL at 08:41

## 2024-03-16 RX ADMIN — CALCIUM CARBONATE (ANTACID) CHEW TAB 500 MG 1000 MG: 500 CHEW TAB at 08:41

## 2024-03-16 RX ADMIN — ACETAMINOPHEN 650 MG: 325 TABLET ORAL at 14:20

## 2024-03-16 RX ADMIN — HYDROXYZINE PAMOATE 25 MG: 25 CAPSULE ORAL at 00:36

## 2024-03-16 RX ADMIN — OLANZAPINE 5 MG: 5 TABLET, FILM COATED ORAL at 23:14

## 2024-03-16 RX ADMIN — HYDROXYZINE PAMOATE 25 MG: 25 CAPSULE ORAL at 06:12

## 2024-03-16 RX ADMIN — CARVEDILOL 12.5 MG: 12.5 TABLET, FILM COATED ORAL at 08:41

## 2024-03-16 ASSESSMENT — PAIN SCALES - WONG BAKER: WONGBAKER_NUMERICALRESPONSE: NO HURT

## 2024-03-16 ASSESSMENT — COLUMBIA-SUICIDE SEVERITY RATING SCALE - C-SSRS
1. SINCE LAST CONTACT, HAVE YOU WISHED YOU WERE DEAD OR WISHED YOU COULD GO TO SLEEP AND NOT WAKE UP?: NO
6. HAVE YOU EVER DONE ANYTHING, STARTED TO DO ANYTHING, OR PREPARED TO DO ANYTHING TO END YOUR LIFE?: NO
2. HAVE YOU ACTUALLY HAD ANY THOUGHTS OF KILLING YOURSELF?: NO

## 2024-03-16 ASSESSMENT — PAIN SCALES - GENERAL
PAINLEVEL_OUTOF10: 6
PAINLEVEL_OUTOF10: 8
PAINLEVEL_OUTOF10: 5 - MODERATE PAIN

## 2024-03-16 ASSESSMENT — PAIN - FUNCTIONAL ASSESSMENT
PAIN_FUNCTIONAL_ASSESSMENT: 0-10
PAIN_FUNCTIONAL_ASSESSMENT: 0-10

## 2024-03-16 ASSESSMENT — PAIN DESCRIPTION - LOCATION: LOCATION: ABDOMEN

## 2024-03-16 NOTE — NURSING NOTE
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

## 2024-03-16 NOTE — CARE PLAN
"  Problem: Sensory Perceptual Alteration as Evidenced by  Goal: Patient/Family participate in treatment and discharge plans  Outcome: Progressing  Goal: Participates in unit activities  Outcome: Not Progressing  Goal: Free from restraint events  Outcome: Progressing     Problem: Psychosocial Needs  Goal: Collaborate with me, my family, and caregiver to identify my specific goals  Recent Flowsheet Documentation  Taken 3/16/2024 0915 by Janette Orellana RN  Cultural Requests During Hospitalization: none  Spiritual Requests During Hospitalization: none   The patient's goals for the shift include \"I need some sleep\"    The clinical goals for the shift include maintain safety    Over the shift, the patient did make progress toward the following goals. The pt had an uneventful day. The pt was medication compliant and slept off and on throughout the day. The pt was incontinent of urine and needed assistance getting cleaned up. The pt stated the Tylenol administered for pain was helpful and rated his pain a 3/10 in the left shoulder. Q 15 minute monitoring continued.      Problem: Sensory Perceptual Alteration as Evidenced by  Goal: Participates in unit activities  Outcome: Not Progressing     "

## 2024-03-16 NOTE — CARE PLAN
"The patient's goals for the shift include \"have a peaceful evening and get sleep:    The clinical goals for the shift include medication compliance    Over the shift, the patient did not make progress toward the following goals. Barriers to progression include lack of medication knowledge. Recommendations to address these barriers include more medication education.    "

## 2024-03-16 NOTE — NURSING NOTE
"The pt was calm and cooperative during the interview that took place in the pt's room away from his peers for privacy. The pt rated his anxiety and depression both, \"High, because I want to go home and I miss my dog. She's always by my side.\" The pt denies SI, HI and AVH at this time. Pt rated his pain a 6/10 in his left shoulder that's chronic. Last bowel movement was 3/15/24. The pt could not give this nurse coping skills or pt strengths. The pt's goal for the day is, \"I need some sleep.\" Q 15 minute monitoring continued.   "

## 2024-03-16 NOTE — NURSING NOTE
Pt took his night time medications  Pt slept on and off during the night  Pt slept most of the night and had an uneventful

## 2024-03-17 LAB
ALBUMIN SERPL BCP-MCNC: 3 G/DL (ref 3.4–5)
ALP SERPL-CCNC: 83 U/L (ref 33–136)
ALT SERPL W P-5'-P-CCNC: 23 U/L (ref 10–52)
ANION GAP SERPL CALC-SCNC: 13 MMOL/L (ref 10–20)
AST SERPL W P-5'-P-CCNC: 19 U/L (ref 9–39)
BILIRUB DIRECT SERPL-MCNC: 0.1 MG/DL (ref 0–0.3)
BILIRUB SERPL-MCNC: 0.5 MG/DL (ref 0–1.2)
BUN SERPL-MCNC: 26 MG/DL (ref 6–23)
CALCIUM SERPL-MCNC: 8.2 MG/DL (ref 8.6–10.3)
CHLORIDE SERPL-SCNC: 100 MMOL/L (ref 98–107)
CO2 SERPL-SCNC: 28 MMOL/L (ref 21–32)
CREAT SERPL-MCNC: 1.1 MG/DL (ref 0.5–1.3)
EGFRCR SERPLBLD CKD-EPI 2021: 71 ML/MIN/1.73M*2
ERYTHROCYTE [DISTWIDTH] IN BLOOD BY AUTOMATED COUNT: 14.4 % (ref 11.5–14.5)
GLUCOSE SERPL-MCNC: 138 MG/DL (ref 74–99)
HCT VFR BLD AUTO: 43.4 % (ref 41–52)
HGB BLD-MCNC: 14.3 G/DL (ref 13.5–17.5)
MAGNESIUM SERPL-MCNC: 1.75 MG/DL (ref 1.6–2.4)
MCH RBC QN AUTO: 30.6 PG (ref 26–34)
MCHC RBC AUTO-ENTMCNC: 32.9 G/DL (ref 32–36)
MCV RBC AUTO: 93 FL (ref 80–100)
NRBC BLD-RTO: 0 /100 WBCS (ref 0–0)
PHOSPHATE SERPL-MCNC: 3.4 MG/DL (ref 2.5–4.9)
PLATELET # BLD AUTO: 189 X10*3/UL (ref 150–450)
POTASSIUM SERPL-SCNC: 4.6 MMOL/L (ref 3.5–5.3)
PROT SERPL-MCNC: 6 G/DL (ref 6.4–8.2)
RBC # BLD AUTO: 4.67 X10*6/UL (ref 4.5–5.9)
SODIUM SERPL-SCNC: 136 MMOL/L (ref 136–145)
WBC # BLD AUTO: 9.6 X10*3/UL (ref 4.4–11.3)

## 2024-03-17 PROCEDURE — 1240000001 HC SEMI-PRIVATE BH ROOM DAILY

## 2024-03-17 PROCEDURE — 84100 ASSAY OF PHOSPHORUS: CPT | Performed by: SURGERY

## 2024-03-17 PROCEDURE — 2500000001 HC RX 250 WO HCPCS SELF ADMINISTERED DRUGS (ALT 637 FOR MEDICARE OP): Performed by: NURSE PRACTITIONER

## 2024-03-17 PROCEDURE — 2500000005 HC RX 250 GENERAL PHARMACY W/O HCPCS: Performed by: NURSE PRACTITIONER

## 2024-03-17 PROCEDURE — 80048 BASIC METABOLIC PNL TOTAL CA: CPT | Performed by: SURGERY

## 2024-03-17 PROCEDURE — 94660 CPAP INITIATION&MGMT: CPT

## 2024-03-17 PROCEDURE — 99223 1ST HOSP IP/OBS HIGH 75: CPT | Performed by: SURGERY

## 2024-03-17 PROCEDURE — 36415 COLL VENOUS BLD VENIPUNCTURE: CPT | Performed by: SURGERY

## 2024-03-17 PROCEDURE — 2500000001 HC RX 250 WO HCPCS SELF ADMINISTERED DRUGS (ALT 637 FOR MEDICARE OP): Performed by: PSYCHIATRY & NEUROLOGY

## 2024-03-17 PROCEDURE — 99232 SBSQ HOSP IP/OBS MODERATE 35: CPT | Performed by: PSYCHIATRY & NEUROLOGY

## 2024-03-17 PROCEDURE — 82247 BILIRUBIN TOTAL: CPT | Performed by: SURGERY

## 2024-03-17 PROCEDURE — 99232 SBSQ HOSP IP/OBS MODERATE 35: CPT | Performed by: NURSE PRACTITIONER

## 2024-03-17 PROCEDURE — 83735 ASSAY OF MAGNESIUM: CPT | Performed by: SURGERY

## 2024-03-17 PROCEDURE — 85027 COMPLETE CBC AUTOMATED: CPT | Performed by: SURGERY

## 2024-03-17 PROCEDURE — 2500000002 HC RX 250 W HCPCS SELF ADMINISTERED DRUGS (ALT 637 FOR MEDICARE OP, ALT 636 FOR OP/ED): Performed by: NURSE PRACTITIONER

## 2024-03-17 RX ORDER — SUCRALFATE 1 G/10ML
1 SUSPENSION ORAL 4 TIMES DAILY
Status: DISCONTINUED | OUTPATIENT
Start: 2024-03-17 | End: 2024-03-18

## 2024-03-17 RX ORDER — DONEPEZIL HYDROCHLORIDE 5 MG/1
5 TABLET, ORALLY DISINTEGRATING ORAL NIGHTLY
COMMUNITY
End: 2024-03-20 | Stop reason: HOSPADM

## 2024-03-17 RX ORDER — CHOLECALCIFEROL (VITAMIN D3) 25 MCG
1000 TABLET ORAL DAILY
Status: DISCONTINUED | OUTPATIENT
Start: 2024-03-17 | End: 2024-03-20 | Stop reason: HOSPADM

## 2024-03-17 RX ORDER — DONEPEZIL HYDROCHLORIDE 5 MG/1
10 TABLET, FILM COATED ORAL NIGHTLY
Status: DISCONTINUED | OUTPATIENT
Start: 2024-03-17 | End: 2024-03-20 | Stop reason: HOSPADM

## 2024-03-17 RX ORDER — TAMSULOSIN HYDROCHLORIDE 0.4 MG/1
0.4 CAPSULE ORAL DAILY
Status: DISCONTINUED | OUTPATIENT
Start: 2024-03-17 | End: 2024-03-20 | Stop reason: HOSPADM

## 2024-03-17 RX ORDER — INDOCYANINE GREEN AND WATER 25 MG
2.5 KIT INJECTION ONCE
Status: COMPLETED | OUTPATIENT
Start: 2024-03-17 | End: 2024-03-18

## 2024-03-17 RX ORDER — TAMSULOSIN HYDROCHLORIDE 0.4 MG/1
0.4 CAPSULE ORAL DAILY
COMMUNITY
End: 2024-03-20 | Stop reason: HOSPADM

## 2024-03-17 RX ORDER — PANTOPRAZOLE SODIUM 40 MG/1
40 TABLET, DELAYED RELEASE ORAL
Status: DISCONTINUED | OUTPATIENT
Start: 2024-03-17 | End: 2024-03-19

## 2024-03-17 RX ORDER — DONEPEZIL HYDROCHLORIDE 5 MG/1
5 TABLET, FILM COATED ORAL NIGHTLY
Status: DISCONTINUED | OUTPATIENT
Start: 2024-03-17 | End: 2024-03-17

## 2024-03-17 RX ADMIN — PANTOPRAZOLE SODIUM 40 MG: 40 TABLET, DELAYED RELEASE ORAL at 16:19

## 2024-03-17 RX ADMIN — ACETAMINOPHEN 650 MG: 325 TABLET ORAL at 20:51

## 2024-03-17 RX ADMIN — FAMOTIDINE 20 MG: 20 TABLET ORAL at 08:10

## 2024-03-17 RX ADMIN — ACETAMINOPHEN 650 MG: 325 TABLET ORAL at 02:02

## 2024-03-17 RX ADMIN — SUCRALFATE 1 G: 1 SUSPENSION ORAL at 20:50

## 2024-03-17 RX ADMIN — Medication 1000 UNITS: at 08:10

## 2024-03-17 RX ADMIN — NYSTATIN: 100000 OINTMENT TOPICAL at 20:53

## 2024-03-17 RX ADMIN — NYSTATIN 1 APPLICATION: 100000 OINTMENT TOPICAL at 08:12

## 2024-03-17 RX ADMIN — Medication 5 MG: at 20:51

## 2024-03-17 RX ADMIN — LISINOPRIL 10 MG: 5 TABLET ORAL at 08:10

## 2024-03-17 RX ADMIN — TRAZODONE HYDROCHLORIDE 100 MG: 100 TABLET ORAL at 20:51

## 2024-03-17 RX ADMIN — CARVEDILOL 12.5 MG: 12.5 TABLET, FILM COATED ORAL at 20:51

## 2024-03-17 RX ADMIN — DONEPEZIL HYDROCHLORIDE 10 MG: 5 TABLET ORAL at 20:51

## 2024-03-17 RX ADMIN — CARVEDILOL 12.5 MG: 12.5 TABLET, FILM COATED ORAL at 08:10

## 2024-03-17 RX ADMIN — LIDOCAINE 1 PATCH: 4 PATCH TOPICAL at 08:10

## 2024-03-17 RX ADMIN — ALUMINUM HYDROXIDE, MAGNESIUM HYDROXIDE, AND DIMETHICONE 30 ML: 200; 20; 200 SUSPENSION ORAL at 11:16

## 2024-03-17 RX ADMIN — CALCIUM CARBONATE (ANTACID) CHEW TAB 500 MG 1000 MG: 500 CHEW TAB at 08:10

## 2024-03-17 RX ADMIN — ACETAMINOPHEN 650 MG: 325 TABLET ORAL at 06:37

## 2024-03-17 RX ADMIN — TAMSULOSIN HYDROCHLORIDE 0.4 MG: 0.4 CAPSULE ORAL at 08:10

## 2024-03-17 RX ADMIN — ALUMINUM HYDROXIDE, MAGNESIUM HYDROXIDE, AND DIMETHICONE 30 ML: 200; 20; 200 SUSPENSION ORAL at 03:48

## 2024-03-17 RX ADMIN — SUCRALFATE 1 G: 1 SUSPENSION ORAL at 14:03

## 2024-03-17 RX ADMIN — ASPIRIN 81 MG: 81 TABLET, COATED ORAL at 08:10

## 2024-03-17 RX ADMIN — ATORVASTATIN CALCIUM 80 MG: 80 TABLET, FILM COATED ORAL at 20:51

## 2024-03-17 RX ADMIN — PANTOPRAZOLE SODIUM 40 MG: 40 TABLET, DELAYED RELEASE ORAL at 06:36

## 2024-03-17 RX ADMIN — FLUOXETINE HYDROCHLORIDE 20 MG: 20 CAPSULE ORAL at 08:10

## 2024-03-17 RX ADMIN — SUCRALFATE 1 G: 1 SUSPENSION ORAL at 18:28

## 2024-03-17 ASSESSMENT — COLUMBIA-SUICIDE SEVERITY RATING SCALE - C-SSRS
6. HAVE YOU EVER DONE ANYTHING, STARTED TO DO ANYTHING, OR PREPARED TO DO ANYTHING TO END YOUR LIFE?: NO
1. SINCE LAST CONTACT, HAVE YOU WISHED YOU WERE DEAD OR WISHED YOU COULD GO TO SLEEP AND NOT WAKE UP?: NO
2. HAVE YOU ACTUALLY HAD ANY THOUGHTS OF KILLING YOURSELF?: NO

## 2024-03-17 ASSESSMENT — PAIN - FUNCTIONAL ASSESSMENT
PAIN_FUNCTIONAL_ASSESSMENT: 0-10

## 2024-03-17 ASSESSMENT — PAIN DESCRIPTION - LOCATION: LOCATION: STERNUM

## 2024-03-17 ASSESSMENT — PAIN SCALES - GENERAL
PAINLEVEL_OUTOF10: 5 - MODERATE PAIN
PAINLEVEL_OUTOF10: 3
PAINLEVEL_OUTOF10: 5 - MODERATE PAIN
PAINLEVEL_OUTOF10: 7
PAINLEVEL_OUTOF10: 3

## 2024-03-17 NOTE — NURSING NOTE
"The pt was calm and cooperative during the interview that took place in the pt's room away from his peers for privacy. The pt rated hi anxiety a 7/10, depression a 5/10, denies SI, HI and AVH at this time. Pain rated a 10/10 in his RUQ. Last bowel movement was 3/16/24. Coping skills, \"fishing.\" Goal for the day, \"get out of here.\" Pt strengths, \"I'm a good listened.\" The pt was medication compliant with his morning medications. Q 15 minute rounding continued.   "

## 2024-03-17 NOTE — H&P (VIEW-ONLY)
General Surgery History and Physical    Referring Provider:  ЕЛЕНА Cotton Haley, MD     Chief Complaint:  Right upper quadrant pain    History of Present Illness:  This is a 73 y.o. male who presents with right upper quadrant pain.  He is currently admitted to inpatient psychiatry.  He reports that he has known about gallstones for quite a while, but had never had a cholecystectomy.  However, while in inpatient psychiatry, he notices that he has significant right upper quadrant pain.  Eating fatty foods makes this worse.  He denies any fevers or chills.  He denies any jaundice or icterus.    Past Medical History:  Obesity  Chronic obstructive pulmonary disease  Hypertension  Hyperlipidemia  Depression  Posttraumatic stress disorder  Gastroesophageal reflux disease    Past Surgical History:  Tonsillectomy  Femur surgery  Knee surgery    Medications:  Albuterol  Aspirin  Atorvastatin  Azithromycin  Symbicort  Carvedilol  Donepezil  Famotidine  Fluoxetine  Lisinopril  Meloxicam  Methocarbamol  Omeprazole  Pantoprazole  Tamsulosin    Allergies:  Penicillin    Family History:  Diabetes    Social History:  .  Retired long-.  Former smoker.  Denies alcohol and illicits.    Review of Systems:  A complete 12 point review of systems was performed and is negative except as noted in the history of present illness.      Vital Signs:  Vitals:    03/17/24 0616   BP: 120/77   Pulse: 84   Resp: 20   Temp: 36.9 °C (98.4 °F)   SpO2: 96%          Physical Exam:  General: No acute distress. Sitting up in bed.   Neuro: Alert and oriented ×3. Follows commands.  Head: Atraumatic  Eyes: Pupils equal reactive to light. Extraocular motions intact.  Ears: Hears normal speaking voice.  Mouth, Nose, Throat: Mucous membranes moist.  Normal dentition.  Neck: Supple. No appreciable masses.  Chest: No crepitus.  No appreciable scars.  Heart: Regular rate and rhythm.  Lung: Clear to auscultation  "bilaterally.  Vascular: No carotid bruits.  Palpable radial pulses bilaterally.  Abdomen: Soft. Nondistended.  Obese.  Mild right upper quadrant tenderness.  Musculoskeletal: Moves all extremities.  Normal range of motion.  Lymphatic: No palpable lymph nodes.  Skin: No rashes or lesions.  Psychological: Normal affect      Laboratory Values:  CBC:   Lab Results   Component Value Date    WBC 9.6 03/17/2024    RBC 4.67 03/17/2024    HGB 14.3 03/17/2024    HCT 43.4 03/17/2024     03/17/2024       RFP: No results found for: \"GLUF\", \"NA\", \"K\", \"CL\", \"CO2\", \"BUN\", \"CREATININE\", \"CALCIUM\", \"MG\", \"PHOS\"     LFTs: No results found for: \"PROT\", \"ALBUMIN\", \"BILITOT\", \"BILIDIR\", \"ALKPHOS\", \"AST\", \"ALT\"         Imaging:  I have personally reviewed the images and the radiologist's report.  Medical quadrant ultrasound: Gallstones.      Assessment:  This is a 73 y.o. male who presents with right upper quadrant pain and findings of gallstones on imaging.  We discussed that he likely has chronic cholecystitis and I would recommend a cholecystectomy.  He is planning on discharge from psychiatry to rehabilitation tomorrow.  We discussed that we could attempt cholecystectomy tomorrow prior to his discharge.      Plan:  -- Low-fat diet  -- N.p.o. at midnight  -- Plan on a laparoscopic cholecystectomy tomorrow      Yonas Carvalho MD  General Surgery  Office: [phone]  Fax:     [phone]  11:14 AM   03/17/24     "

## 2024-03-17 NOTE — CARE PLAN
"  Problem: Sensory Perceptual Alteration as Evidenced by  Goal: Understands least restrictive measures  Outcome: Progressing     Problem: Ineffective Coping  Goal: Identifies healthy coping skills  Outcome: Progressing     Problem: Alteration in Sleep  Goal: STG - Reports nightly sleep, duration, and quality  Outcome: Progressing     Problem: Psychosocial Needs  Goal: Collaborate with me, my family, and caregiver to identify my specific goals  Recent Flowsheet Documentation  Taken 3/17/2024 0800 by Janette Orellana RN  Cultural Requests During Hospitalization: None  Spiritual Requests During Hospitalization: None   The patient's goals for the shift include \"Get out of here\"    The clinical goals for the shift include maintain safety    Over the shift, the patient did make progress toward the following goals. The pt had an uneventful day. The pt was medication compliant and ate at all meals. The pt was bathed today. Q 15 minute monitoring continued.     "

## 2024-03-17 NOTE — NURSING NOTE
Patient assessed while in his room in his bed.  Patient friendly and cooperative with assessment.  States his anxiety and depression are very high.  Denies any suicidal ideations and hallucinations.  Complains of pain 9/10 in his abdomen and ribs.  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  His strength is he is good at sports, particularly football.  His goal is to get home.  Patient was given Tylenol for his pain and Vistaril for his anxiety.  He was offered an Ipad to listen to music to help him sleep. No further PRN medications were administered. Will continue to monitor.

## 2024-03-17 NOTE — PROGRESS NOTES
Donald Mcknight is a 73 y.o. male on day 14 of admission presenting with Depression with suicidal ideation.      Subjective   Spoke with the wife on the phone and discussed CT findings with symptoms regarding inability to walk.  Wife was not previously aware of a stroke in the past.  Wife conferred that the patient has had a gradual decline with ability to walk since before December 2024.  She said that a chairlift was placed in their home in December due to this inability to ambulate.  He was barely able to ambulate prior to coming to the hospital even with an assistive device.  He also had knee surgery on the left lower extremity in September 2022.  He was started on medication for dementia (5 mg donepezil) around December 2024 by the psychiatrist.  The wife noted that he was having hallucinations and becoming forgetful.  The patient continues to complain of epigastric discomfort and RUQ pain on exam even though US neg and has miguel getting pepcid, PPI, maalox and tums. No complaints of chest pain.  No new issues in the ROS.    Objective     Last Recorded Vitals  /77 (BP Location: Right arm, Patient Position: Lying)   Pulse 84   Temp 36.9 °C (98.4 °F) (Temporal)   Resp 20   Wt 109 kg (240 lb 1.3 oz)   SpO2 96%   Intake/Output last 3 Shifts:  No intake or output data in the 24 hours ending 03/17/24 0742    Admission Weight  Weight: 117 kg (257 lb 15 oz) (03/04/24 0700)    Daily Weight  03/10/24 : 109 kg (240 lb 1.3 oz)    Image Results  ECG 12 lead  Sinus rhythm  Abnormal R-wave progression, early transition  Borderline repolarization abnormality  Borderline prolonged QT interval    See ED provider note for full interpretation and clinical correlation  Confirmed by Lashell Beck ([id number]) on 3/12/2024 3:33:22 PM  ECG 12 lead  See ED provider note for full interpretation and clinical correlation  Confirmed by Lashell Beck ([id number]) on 3/12/2024 10:27:51 AM      Physical Exam  Constitutional:        Appearance: Normal appearance. He is obese.   HENT:      Head: Normocephalic and atraumatic.      Mouth/Throat:      Mouth: Mucous membranes are moist.      Pharynx: Oropharynx is clear. No oropharyngeal exudate or posterior oropharyngeal erythema.   Neck: No JVD or use of accessory muscles.  Eyes:      Extraocular Movements: Extraocular movements intact.      Conjunctiva/sclera: Conjunctivae normal.   Cardiovascular:      Rate and Rhythm: Normal rate and regular rhythm. Chest pain not reproducible.     Pulses: Normal pulses.      Heart sounds: Normal heart sounds. No murmur heard.  Pulmonary:      Effort: No respiratory distress. On 2-3 L NC.     Breath sounds: No wheezing or rhonchi. No tachypnea & labored breathing, No cyanosis.  Abdominal:      General: Bowel sounds are normal. There is no distension. Rectal nandini intact.     Palpations: Abdomen is soft. There is no mass.      Tenderness: There is no abdominal tenderness. There is no guarding or rebound. + RUQ pain on exam with palpation.  Extremities: No swelling and palpable distal pulses.   Musculoskeletal:         General: No swelling or tenderness.      Comments: No focal weakness   Neurological:      Mental Status: He is alert and oriented to person, place, and time. +forgetful.     Cranial Nerves: No cranial nerve deficit.      Sensory: No sensory deficit.      Motor: No weakness.        Assessment/Plan   Scheduled medications  aspirin, 81 mg, oral, Daily  atorvastatin, 80 mg, oral, Nightly  calcium carbonate, 1,000 mg, oral, Daily  carvedilol, 12.5 mg, oral, BID  cholecalciferol, 1,000 Units, oral, Daily  donepezil, 10 mg, oral, Nightly  famotidine, 20 mg, oral, Daily  FLUoxetine, 20 mg, oral, Daily  lidocaine, 1 patch, transdermal, Daily  lisinopril, 10 mg, oral, Daily  nystatin, , Topical, BID  pantoprazole, 40 mg, oral, BID AC  perflutren lipid microspheres, 0.5-10 mL of dilution, intravenous, Once in imaging  tamsulosin, 0.4 mg, oral,  Daily  traZODone, 100 mg, oral, Nightly      Continuous medications     PRN medications  PRN medications: acetaminophen, albuterol, alum-mag hydroxide-simeth, hydrOXYzine pamoate, melatonin, OLANZapine **OR** OLANZapine, oxygen, psyllium    No results found for this or any previous visit (from the past 24 hour(s)).    No results found.    Active Problems:  There are no active Hospital Problems.  Psychosis/Hallucinations/Delusions/HX PTSD/HS Depression  Psych primary and to f/u regarding routine labs ordered  5 mg additional Zyprexa  Rec a MOCA assessment-scored 14/30  Supportive care     Hypokalemia  resolved     Chest Pain, Cause Unknown/HX CHF  -Ongoing chest pain/epigastric pain, ?Angina, HEART score 5  -Not previously seen by cardiology and no previous ECHO  Troponin x's 3 neg, EKG reviewed  Cardiology consult-appreciate recs; Needs to f/u outpt with cards for stress test and wife aware    Epigastric Pain/GERD  -Chololithiasis without evidence for cholecystitis on US-consulted surgery surgery since + RUQ pain on exam with continued unrelieved symptoms  No GI coverage; will need to f/u with GI outpt on dc   PPI doubled to BID  Added carafate QID-after meals and before bed  Continue pepcid    Diverticulitis  -Resolved    Inability to ambulate  -Gradual since over 3 mo ago  -Likely from worsening dementia and new finding of old stroke on head CT  -No evidence of NPH on CT  Discussed with wife  Plan to go to rehab     Gen Weakness  PT/OT/SW  Will need rehab placement     Intertrigo  Nystatin     HTN   Lisinopril     HPLD  Statin  Lipid panel reviewed     ARTEMIO/COPD 2-3 L NC at baseline  CPAP, oxygen, pulse ox  PRN albuterol     Obesity  Wt loss encouraged     Incidental CT Findings  Stable enlarged infraclavicular lymph nodes/3-4 pulm nodules-F/U pulmonary outpt    Inability to Ambulate  PT/OT/SW  Supposed    Old Stroke on CT  Already on aspirin or statin    Dementia  -Been forgetful and having hallucinations per  wife  -MOCA 14/30  Was on Donepezil 5 mg at home and increased to 10 mg  Needs to f/u with psych on dc who started the medication    BPH  Flomax resumed    Vitamin D Deficiency  Vitamin D replacements resumed, taking at home  Vit D level WNL     DVT PX  Encourage ambulation     Teresa Myers, APRN-CNP  55 min spent reviewing chart, assessing pt, updating wife on care plan and updating nursing and placing orders.

## 2024-03-17 NOTE — PROGRESS NOTES
"Donald Mcknight is a 73 y.o. male on day 14 of admission presenting with Depression with suicidal ideation.      Subjective   The patient was seen and examined. I reviewed the chart and vital signs from overnight. I reviewed previous notes. I reviewed medications, administered overnight and their reported benefits or side effects.  Patient reported  slept better on the trazodone. Reporting gas pains, ruq pain. Seen by gi who recommended gallbladder removal on this admission, patient spoke with wife, surgeron spoke to both of them, agreed.     Objective     Last Recorded Vitals  Blood pressure 116/78, pulse 83, temperature 36.8 °C (98.2 °F), temperature source Temporal, resp. rate 20, height 1.854 m (6' 0.99\"), weight 102 kg (224 lb 6.9 oz), SpO2 97 %.    Review of Systems    Ruq pain, gas pains  Chronic pains  Chronic sleep issues    Mental Status Exam  General: CM with mental status change  Appearance: laying in bed, in gown, wearing oxygen,in gown  Attitude: cooperative with assessment  Behavior: good eye contact  Motor Activity: unable to test gait, no eps or td note, normal muscle bulk, is 2 man assist  Speech: clear tone, volume, rate, fluent  Mood: denied depression or anxiety  Affect: stable, euthymic  Thought Process: linear, organized, goal directed   Thought Content: no suicidal or homicidal ideations, no delusions elicited  Thought Perception: denied auditory and visual hallucinations, no somatic, olfactory delusions elicited  Cognition: alert and oriented to self, mild cognitive deficits on moca  Insight: fair  Judgement: accepting of help, able to work with team to make medical decisions        IMPRESSION:    Depression other  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      Whitney Hein MD

## 2024-03-17 NOTE — CARE PLAN
"The patient's goals for the shift include \"Get Home\"    The clinical goals for the shift include Medication compliance and Safety    Barriers to progression include acuity of illness. Recommendations to address these barriers include medication compliance and education.    03/17/24 0410   Patient slept minimally, stated he is not used to sleeping more than a few hours during the night.  Patient required additional Tylenol administration along with Maalox for heartburn.  Patient spent a considerable amount of time talking to himself in his room and shouting for different needs.  He also received Melatonin 5 mg and Zyprexa 5 mg earlier during this shift as PRN medication.  No further changes, no changes from previous assessment.    "

## 2024-03-17 NOTE — CONSULTS
General Surgery History and Physical    Referring Provider:  ЕЛЕНА Cotton Haley, MD     Chief Complaint:  Right upper quadrant pain    History of Present Illness:  This is a 73 y.o. male who presents with right upper quadrant pain.  He is currently admitted to inpatient psychiatry.  He reports that he has known about gallstones for quite a while, but had never had a cholecystectomy.  However, while in inpatient psychiatry, he notices that he has significant right upper quadrant pain.  Eating fatty foods makes this worse.  He denies any fevers or chills.  He denies any jaundice or icterus.    Past Medical History:  Obesity  Chronic obstructive pulmonary disease  Hypertension  Hyperlipidemia  Depression  Posttraumatic stress disorder  Gastroesophageal reflux disease    Past Surgical History:  Tonsillectomy  Femur surgery  Knee surgery    Medications:  Albuterol  Aspirin  Atorvastatin  Azithromycin  Symbicort  Carvedilol  Donepezil  Famotidine  Fluoxetine  Lisinopril  Meloxicam  Methocarbamol  Omeprazole  Pantoprazole  Tamsulosin    Allergies:  Penicillin    Family History:  Diabetes    Social History:  .  Retired long-.  Former smoker.  Denies alcohol and illicits.    Review of Systems:  A complete 12 point review of systems was performed and is negative except as noted in the history of present illness.      Vital Signs:  Vitals:    03/17/24 0616   BP: 120/77   Pulse: 84   Resp: 20   Temp: 36.9 °C (98.4 °F)   SpO2: 96%          Physical Exam:  General: No acute distress. Sitting up in bed.   Neuro: Alert and oriented ×3. Follows commands.  Head: Atraumatic  Eyes: Pupils equal reactive to light. Extraocular motions intact.  Ears: Hears normal speaking voice.  Mouth, Nose, Throat: Mucous membranes moist.  Normal dentition.  Neck: Supple. No appreciable masses.  Chest: No crepitus.  No appreciable scars.  Heart: Regular rate and rhythm.  Lung: Clear to auscultation  "bilaterally.  Vascular: No carotid bruits.  Palpable radial pulses bilaterally.  Abdomen: Soft. Nondistended.  Obese.  Mild right upper quadrant tenderness.  Musculoskeletal: Moves all extremities.  Normal range of motion.  Lymphatic: No palpable lymph nodes.  Skin: No rashes or lesions.  Psychological: Normal affect      Laboratory Values:  CBC:   Lab Results   Component Value Date    WBC 9.6 03/17/2024    RBC 4.67 03/17/2024    HGB 14.3 03/17/2024    HCT 43.4 03/17/2024     03/17/2024       RFP: No results found for: \"GLUF\", \"NA\", \"K\", \"CL\", \"CO2\", \"BUN\", \"CREATININE\", \"CALCIUM\", \"MG\", \"PHOS\"     LFTs: No results found for: \"PROT\", \"ALBUMIN\", \"BILITOT\", \"BILIDIR\", \"ALKPHOS\", \"AST\", \"ALT\"         Imaging:  I have personally reviewed the images and the radiologist's report.  Medical quadrant ultrasound: Gallstones.      Assessment:  This is a 73 y.o. male who presents with right upper quadrant pain and findings of gallstones on imaging.  We discussed that he likely has chronic cholecystitis and I would recommend a cholecystectomy.  He is planning on discharge from psychiatry to rehabilitation tomorrow.  We discussed that we could attempt cholecystectomy tomorrow prior to his discharge.      Plan:  -- Low-fat diet  -- N.p.o. at midnight  -- Plan on a laparoscopic cholecystectomy tomorrow      Yonas Carvalho MD  General Surgery  Office: [phone]  Fax:     [phone]  11:14 AM   03/17/24     "

## 2024-03-18 ENCOUNTER — ANESTHESIA EVENT (OUTPATIENT)
Dept: OPERATING ROOM | Facility: HOSPITAL | Age: 74
DRG: 884 | End: 2024-03-18
Payer: MEDICARE

## 2024-03-18 ENCOUNTER — HOSPITAL ENCOUNTER (OUTPATIENT)
Facility: HOSPITAL | Age: 74
Setting detail: SURGERY ADMIT
Discharge: HOME | End: 2024-03-18
Attending: SURGERY | Admitting: SURGERY
Payer: OTHER GOVERNMENT

## 2024-03-18 ENCOUNTER — ANESTHESIA (OUTPATIENT)
Dept: OPERATING ROOM | Facility: HOSPITAL | Age: 74
DRG: 884 | End: 2024-03-18
Payer: MEDICARE

## 2024-03-18 VITALS
RESPIRATION RATE: 16 BRPM | SYSTOLIC BLOOD PRESSURE: 113 MMHG | DIASTOLIC BLOOD PRESSURE: 75 MMHG | OXYGEN SATURATION: 99 % | TEMPERATURE: 97.7 F | HEART RATE: 82 BPM

## 2024-03-18 DIAGNOSIS — K81.1 CHRONIC CHOLECYSTITIS: Primary | ICD-10-CM

## 2024-03-18 PROBLEM — N18.9 CHRONIC RENAL INSUFFICIENCY: Status: ACTIVE | Noted: 2024-03-18

## 2024-03-18 PROBLEM — E78.5 HYPERLIPIDEMIA: Status: ACTIVE | Noted: 2024-03-18

## 2024-03-18 PROBLEM — I50.9 CHF (CONGESTIVE HEART FAILURE) (MULTI): Status: ACTIVE | Noted: 2024-03-18

## 2024-03-18 PROBLEM — G47.33 OSA (OBSTRUCTIVE SLEEP APNEA): Status: ACTIVE | Noted: 2024-03-18

## 2024-03-18 PROBLEM — M19.90 ARTHRITIS: Status: ACTIVE | Noted: 2024-03-18

## 2024-03-18 PROBLEM — F32.A DEPRESSION: Status: ACTIVE | Noted: 2024-03-18

## 2024-03-18 PROBLEM — F43.10 PTSD (POST-TRAUMATIC STRESS DISORDER): Status: ACTIVE | Noted: 2024-03-18

## 2024-03-18 PROBLEM — R06.09 DYSPNEA ON EXERTION: Status: ACTIVE | Noted: 2024-03-18

## 2024-03-18 PROBLEM — E66.9 OBESITY: Status: ACTIVE | Noted: 2024-03-18

## 2024-03-18 PROBLEM — K21.9 GASTROESOPHAGEAL REFLUX DISEASE: Status: ACTIVE | Noted: 2024-03-18

## 2024-03-18 PROBLEM — J44.9 CHRONIC OBSTRUCTIVE PULMONARY DISEASE (MULTI): Status: ACTIVE | Noted: 2024-03-18

## 2024-03-18 PROCEDURE — 2500000004 HC RX 250 GENERAL PHARMACY W/ HCPCS (ALT 636 FOR OP/ED): Performed by: SURGERY

## 2024-03-18 PROCEDURE — 2500000002 HC RX 250 W HCPCS SELF ADMINISTERED DRUGS (ALT 637 FOR MEDICARE OP, ALT 636 FOR OP/ED): Performed by: NURSE PRACTITIONER

## 2024-03-18 PROCEDURE — A47563 PR LAP,CHOLECYSTECTOMY/GRAPH: Performed by: NURSE ANESTHETIST, CERTIFIED REGISTERED

## 2024-03-18 PROCEDURE — 7100000001 HC RECOVERY ROOM TIME - INITIAL BASE CHARGE: Performed by: SURGERY

## 2024-03-18 PROCEDURE — 7100000002 HC RECOVERY ROOM TIME - EACH INCREMENTAL 1 MINUTE: Performed by: SURGERY

## 2024-03-18 PROCEDURE — 2500000001 HC RX 250 WO HCPCS SELF ADMINISTERED DRUGS (ALT 637 FOR MEDICARE OP): Performed by: PSYCHIATRY & NEUROLOGY

## 2024-03-18 PROCEDURE — 2500000001 HC RX 250 WO HCPCS SELF ADMINISTERED DRUGS (ALT 637 FOR MEDICARE OP): Performed by: NURSE PRACTITIONER

## 2024-03-18 PROCEDURE — 2500000005 HC RX 250 GENERAL PHARMACY W/O HCPCS: Performed by: SURGERY

## 2024-03-18 PROCEDURE — 1240000001 HC SEMI-PRIVATE BH ROOM DAILY

## 2024-03-18 PROCEDURE — 2720000007 HC OR 272 NO HCPCS: Performed by: SURGERY

## 2024-03-18 PROCEDURE — 2500000001 HC RX 250 WO HCPCS SELF ADMINISTERED DRUGS (ALT 637 FOR MEDICARE OP): Performed by: SURGERY

## 2024-03-18 PROCEDURE — 94660 CPAP INITIATION&MGMT: CPT

## 2024-03-18 PROCEDURE — 99232 SBSQ HOSP IP/OBS MODERATE 35: CPT | Performed by: PSYCHIATRY & NEUROLOGY

## 2024-03-18 PROCEDURE — 3600000008 HC OR TIME - EACH INCREMENTAL 1 MINUTE - PROCEDURE LEVEL THREE: Performed by: SURGERY

## 2024-03-18 PROCEDURE — 88304 TISSUE EXAM BY PATHOLOGIST: CPT | Mod: TC,GEALAB | Performed by: SURGERY

## 2024-03-18 PROCEDURE — 2500000004 HC RX 250 GENERAL PHARMACY W/ HCPCS (ALT 636 FOR OP/ED): Performed by: ANESTHESIOLOGY

## 2024-03-18 PROCEDURE — 99232 SBSQ HOSP IP/OBS MODERATE 35: CPT | Performed by: NURSE PRACTITIONER

## 2024-03-18 PROCEDURE — 3700000001 HC GENERAL ANESTHESIA TIME - INITIAL BASE CHARGE: Performed by: SURGERY

## 2024-03-18 PROCEDURE — 2500000005 HC RX 250 GENERAL PHARMACY W/O HCPCS: Performed by: NURSE PRACTITIONER

## 2024-03-18 PROCEDURE — 47563 LAPARO CHOLECYSTECTOMY/GRAPH: CPT | Performed by: PHYSICIAN ASSISTANT

## 2024-03-18 PROCEDURE — 47563 LAPARO CHOLECYSTECTOMY/GRAPH: CPT | Performed by: SURGERY

## 2024-03-18 PROCEDURE — 3600000003 HC OR TIME - INITIAL BASE CHARGE - PROCEDURE LEVEL THREE: Performed by: SURGERY

## 2024-03-18 PROCEDURE — 3700000002 HC GENERAL ANESTHESIA TIME - EACH INCREMENTAL 1 MINUTE: Performed by: SURGERY

## 2024-03-18 PROCEDURE — 2500000004 HC RX 250 GENERAL PHARMACY W/ HCPCS (ALT 636 FOR OP/ED): Performed by: NURSE ANESTHETIST, CERTIFIED REGISTERED

## 2024-03-18 PROCEDURE — 2780000003 HC OR 278 NO HCPCS: Performed by: SURGERY

## 2024-03-18 PROCEDURE — 88304 TISSUE EXAM BY PATHOLOGIST: CPT | Performed by: STUDENT IN AN ORGANIZED HEALTH CARE EDUCATION/TRAINING PROGRAM

## 2024-03-18 PROCEDURE — 2500000005 HC RX 250 GENERAL PHARMACY W/O HCPCS: Performed by: NURSE ANESTHETIST, CERTIFIED REGISTERED

## 2024-03-18 PROCEDURE — C1729 CATH, DRAINAGE: HCPCS | Performed by: SURGERY

## 2024-03-18 RX ORDER — OXYCODONE HYDROCHLORIDE 5 MG/1
5 TABLET ORAL EVERY 4 HOURS PRN
Status: DISCONTINUED | OUTPATIENT
Start: 2024-03-18 | End: 2024-03-20 | Stop reason: HOSPADM

## 2024-03-18 RX ORDER — SUCRALFATE 1 G/10ML
1 SUSPENSION ORAL 4 TIMES DAILY
Qty: 1200 ML | Refills: 0 | Status: SHIPPED | OUTPATIENT
Start: 2024-03-18 | End: 2024-03-20 | Stop reason: HOSPADM

## 2024-03-18 RX ORDER — ACETAMINOPHEN 325 MG/1
650 TABLET ORAL EVERY 6 HOURS
Status: CANCELLED | OUTPATIENT
Start: 2024-03-18

## 2024-03-18 RX ORDER — IBUPROFEN 600 MG/1
600 TABLET ORAL EVERY 6 HOURS SCHEDULED
Status: DISCONTINUED | OUTPATIENT
Start: 2024-03-18 | End: 2024-03-20 | Stop reason: HOSPADM

## 2024-03-18 RX ORDER — LIDOCAINE HCL/PF 100 MG/5ML
SYRINGE (ML) INTRAVENOUS AS NEEDED
Status: DISCONTINUED | OUTPATIENT
Start: 2024-03-18 | End: 2024-03-18

## 2024-03-18 RX ORDER — TRAMADOL HYDROCHLORIDE 50 MG/1
50 TABLET ORAL EVERY 4 HOURS PRN
Status: DISCONTINUED | OUTPATIENT
Start: 2024-03-18 | End: 2024-03-20 | Stop reason: HOSPADM

## 2024-03-18 RX ORDER — POLYETHYLENE GLYCOL 3350 17 G/17G
17 POWDER, FOR SOLUTION ORAL DAILY
Status: CANCELLED | OUTPATIENT
Start: 2024-03-18

## 2024-03-18 RX ORDER — SODIUM CHLORIDE, SODIUM LACTATE, POTASSIUM CHLORIDE, CALCIUM CHLORIDE 600; 310; 30; 20 MG/100ML; MG/100ML; MG/100ML; MG/100ML
100 INJECTION, SOLUTION INTRAVENOUS CONTINUOUS
Status: DISCONTINUED | OUTPATIENT
Start: 2024-03-18 | End: 2024-03-18 | Stop reason: HOSPADM

## 2024-03-18 RX ORDER — ONDANSETRON HYDROCHLORIDE 2 MG/ML
INJECTION, SOLUTION INTRAVENOUS AS NEEDED
Status: DISCONTINUED | OUTPATIENT
Start: 2024-03-18 | End: 2024-03-18

## 2024-03-18 RX ORDER — PANTOPRAZOLE SODIUM 40 MG/1
40 TABLET, DELAYED RELEASE ORAL
Qty: 60 TABLET | Refills: 0 | Status: SHIPPED | OUTPATIENT
Start: 2024-03-18 | End: 2024-03-20 | Stop reason: HOSPADM

## 2024-03-18 RX ORDER — NORETHINDRONE AND ETHINYL ESTRADIOL 0.5-0.035
KIT ORAL AS NEEDED
Status: DISCONTINUED | OUTPATIENT
Start: 2024-03-18 | End: 2024-03-18

## 2024-03-18 RX ORDER — DROPERIDOL 2.5 MG/ML
0.62 INJECTION, SOLUTION INTRAMUSCULAR; INTRAVENOUS ONCE AS NEEDED
Status: DISCONTINUED | OUTPATIENT
Start: 2024-03-18 | End: 2024-03-18 | Stop reason: HOSPADM

## 2024-03-18 RX ORDER — VASOPRESSIN 20 U/ML
INJECTION PARENTERAL AS NEEDED
Status: DISCONTINUED | OUTPATIENT
Start: 2024-03-18 | End: 2024-03-18

## 2024-03-18 RX ORDER — TAMSULOSIN HYDROCHLORIDE 0.4 MG/1
0.4 CAPSULE ORAL DAILY
Qty: 30 CAPSULE | Refills: 0 | Status: SHIPPED | OUTPATIENT
Start: 2024-03-18 | End: 2024-04-17

## 2024-03-18 RX ORDER — ACETAMINOPHEN 325 MG/1
650 TABLET ORAL EVERY 6 HOURS
Status: DISCONTINUED | OUTPATIENT
Start: 2024-03-18 | End: 2024-03-20 | Stop reason: HOSPADM

## 2024-03-18 RX ORDER — PROPOFOL 10 MG/ML
INJECTION, EMULSION INTRAVENOUS AS NEEDED
Status: DISCONTINUED | OUTPATIENT
Start: 2024-03-18 | End: 2024-03-18

## 2024-03-18 RX ORDER — DONEPEZIL HYDROCHLORIDE 10 MG/1
10 TABLET, FILM COATED ORAL NIGHTLY
Qty: 30 TABLET | Refills: 0 | Status: SHIPPED | OUTPATIENT
Start: 2024-03-18 | End: 2024-04-17

## 2024-03-18 RX ORDER — CEFAZOLIN 1 G/1
INJECTION, POWDER, FOR SOLUTION INTRAVENOUS AS NEEDED
Status: DISCONTINUED | OUTPATIENT
Start: 2024-03-18 | End: 2024-03-18

## 2024-03-18 RX ORDER — OXYCODONE HYDROCHLORIDE 5 MG/1
5 TABLET ORAL EVERY 4 HOURS PRN
Status: DISCONTINUED | OUTPATIENT
Start: 2024-03-18 | End: 2024-03-18 | Stop reason: HOSPADM

## 2024-03-18 RX ORDER — PHENYLEPHRINE HCL IN 0.9% NACL 0.4MG/10ML
SYRINGE (ML) INTRAVENOUS AS NEEDED
Status: DISCONTINUED | OUTPATIENT
Start: 2024-03-18 | End: 2024-03-18

## 2024-03-18 RX ORDER — BUPIVACAINE HYDROCHLORIDE 5 MG/ML
INJECTION, SOLUTION EPIDURAL; INTRACAUDAL AS NEEDED
Status: DISCONTINUED | OUTPATIENT
Start: 2024-03-18 | End: 2024-03-18 | Stop reason: HOSPADM

## 2024-03-18 RX ORDER — SODIUM CHLORIDE, SODIUM LACTATE, POTASSIUM CHLORIDE, CALCIUM CHLORIDE 600; 310; 30; 20 MG/100ML; MG/100ML; MG/100ML; MG/100ML
100 INJECTION, SOLUTION INTRAVENOUS CONTINUOUS
Status: CANCELLED | OUTPATIENT
Start: 2024-03-18 | End: 2024-03-19

## 2024-03-18 RX ORDER — FENTANYL CITRATE 50 UG/ML
INJECTION, SOLUTION INTRAMUSCULAR; INTRAVENOUS AS NEEDED
Status: DISCONTINUED | OUTPATIENT
Start: 2024-03-18 | End: 2024-03-18

## 2024-03-18 RX ORDER — TRAMADOL HYDROCHLORIDE 50 MG/1
50 TABLET ORAL EVERY 4 HOURS PRN
Status: CANCELLED | OUTPATIENT
Start: 2024-03-18

## 2024-03-18 RX ORDER — OXYCODONE HYDROCHLORIDE 5 MG/1
5 TABLET ORAL EVERY 4 HOURS PRN
Status: CANCELLED | OUTPATIENT
Start: 2024-03-18

## 2024-03-18 RX ORDER — POLYETHYLENE GLYCOL 3350 17 G/17G
17 POWDER, FOR SOLUTION ORAL DAILY
Status: DISCONTINUED | OUTPATIENT
Start: 2024-03-18 | End: 2024-03-20 | Stop reason: HOSPADM

## 2024-03-18 RX ORDER — INDOCYANINE GREEN AND WATER 25 MG
2.5 KIT INJECTION ONCE
Status: DISCONTINUED | OUTPATIENT
Start: 2024-03-18 | End: 2024-03-18 | Stop reason: HOSPADM

## 2024-03-18 RX ORDER — IBUPROFEN 600 MG/1
600 TABLET ORAL EVERY 6 HOURS SCHEDULED
Status: CANCELLED | OUTPATIENT
Start: 2024-03-18

## 2024-03-18 RX ORDER — CHOLECALCIFEROL (VITAMIN D3) 25 MCG
1000 TABLET ORAL DAILY
Qty: 30 TABLET | Refills: 0 | Status: SHIPPED | OUTPATIENT
Start: 2024-03-18 | End: 2024-04-17

## 2024-03-18 RX ORDER — ROCURONIUM BROMIDE 10 MG/ML
INJECTION, SOLUTION INTRAVENOUS AS NEEDED
Status: DISCONTINUED | OUTPATIENT
Start: 2024-03-18 | End: 2024-03-18

## 2024-03-18 RX ORDER — ONDANSETRON HYDROCHLORIDE 2 MG/ML
4 INJECTION, SOLUTION INTRAVENOUS ONCE AS NEEDED
Status: DISCONTINUED | OUTPATIENT
Start: 2024-03-18 | End: 2024-03-18 | Stop reason: HOSPADM

## 2024-03-18 RX ADMIN — Medication 120 MCG: at 16:00

## 2024-03-18 RX ADMIN — EPHEDRINE SULFATE 10 MG: 50 INJECTION, SOLUTION INTRAVENOUS at 15:56

## 2024-03-18 RX ADMIN — TAMSULOSIN HYDROCHLORIDE 0.4 MG: 0.4 CAPSULE ORAL at 10:43

## 2024-03-18 RX ADMIN — OXYCODONE HYDROCHLORIDE 5 MG: 5 TABLET ORAL at 20:57

## 2024-03-18 RX ADMIN — Medication 120 MCG: at 15:46

## 2024-03-18 RX ADMIN — PROPOFOL 20 MG: 10 INJECTION, EMULSION INTRAVENOUS at 16:38

## 2024-03-18 RX ADMIN — ASPIRIN 81 MG: 81 TABLET, COATED ORAL at 10:43

## 2024-03-18 RX ADMIN — Medication 160 MCG: at 15:53

## 2024-03-18 RX ADMIN — HYDROMORPHONE HYDROCHLORIDE 0.5 MG: 1 INJECTION, SOLUTION INTRAMUSCULAR; INTRAVENOUS; SUBCUTANEOUS at 18:06

## 2024-03-18 RX ADMIN — DEXAMETHASONE SODIUM PHOSPHATE 8 MG: 4 INJECTION INTRA-ARTICULAR; INTRALESIONAL; INTRAMUSCULAR; INTRAVENOUS; SOFT TISSUE at 15:50

## 2024-03-18 RX ADMIN — SODIUM CHLORIDE, POTASSIUM CHLORIDE, SODIUM LACTATE AND CALCIUM CHLORIDE: 600; 310; 30; 20 INJECTION, SOLUTION INTRAVENOUS at 15:34

## 2024-03-18 RX ADMIN — CARVEDILOL 12.5 MG: 12.5 TABLET, FILM COATED ORAL at 10:43

## 2024-03-18 RX ADMIN — PROPOFOL 30 MG: 10 INJECTION, EMULSION INTRAVENOUS at 16:24

## 2024-03-18 RX ADMIN — INDOCYANINE GREEN AND WATER 2.5 MG: KIT at 15:51

## 2024-03-18 RX ADMIN — FAMOTIDINE 20 MG: 20 TABLET ORAL at 10:43

## 2024-03-18 RX ADMIN — NYSTATIN: 100000 OINTMENT TOPICAL at 10:46

## 2024-03-18 RX ADMIN — SODIUM CHLORIDE, POTASSIUM CHLORIDE, SODIUM LACTATE AND CALCIUM CHLORIDE: 600; 310; 30; 20 INJECTION, SOLUTION INTRAVENOUS at 16:34

## 2024-03-18 RX ADMIN — Medication 120 MCG: at 15:52

## 2024-03-18 RX ADMIN — CALCIUM CARBONATE (ANTACID) CHEW TAB 500 MG 1000 MG: 500 CHEW TAB at 10:43

## 2024-03-18 RX ADMIN — LIDOCAINE HYDROCHLORIDE 60 MG: 20 INJECTION INTRAVENOUS at 15:41

## 2024-03-18 RX ADMIN — EPHEDRINE SULFATE 5 MG: 50 INJECTION, SOLUTION INTRAVENOUS at 16:20

## 2024-03-18 RX ADMIN — FENTANYL CITRATE 50 MCG: 50 INJECTION, SOLUTION INTRAMUSCULAR; INTRAVENOUS at 16:27

## 2024-03-18 RX ADMIN — ROCURONIUM 50 MG: 100 INJECTION, SOLUTION INTRAVENOUS at 15:41

## 2024-03-18 RX ADMIN — VASOPRESSIN 2 UNITS: 20 INJECTION INTRAVENOUS at 16:20

## 2024-03-18 RX ADMIN — SODIUM CHLORIDE, POTASSIUM CHLORIDE, SODIUM LACTATE AND CALCIUM CHLORIDE 100 ML/HR: 600; 310; 30; 20 INJECTION, SOLUTION INTRAVENOUS at 15:30

## 2024-03-18 RX ADMIN — LIDOCAINE 1 PATCH: 4 PATCH TOPICAL at 10:44

## 2024-03-18 RX ADMIN — Medication 120 MCG: at 16:03

## 2024-03-18 RX ADMIN — Medication 1000 UNITS: at 10:45

## 2024-03-18 RX ADMIN — FLUOXETINE HYDROCHLORIDE 20 MG: 20 CAPSULE ORAL at 10:45

## 2024-03-18 RX ADMIN — ACETAMINOPHEN 650 MG: 325 TABLET ORAL at 20:59

## 2024-03-18 RX ADMIN — VASOPRESSIN 2 UNITS: 20 INJECTION INTRAVENOUS at 16:05

## 2024-03-18 RX ADMIN — Medication 120 MCG: at 15:58

## 2024-03-18 RX ADMIN — ONDANSETRON 4 MG: 2 INJECTION INTRAMUSCULAR; INTRAVENOUS at 16:26

## 2024-03-18 RX ADMIN — Medication 160 MCG: at 15:56

## 2024-03-18 RX ADMIN — VASOPRESSIN 2 UNITS: 20 INJECTION INTRAVENOUS at 16:30

## 2024-03-18 RX ADMIN — SUGAMMADEX 200 MG: 100 INJECTION, SOLUTION INTRAVENOUS at 16:33

## 2024-03-18 RX ADMIN — PROPOFOL 150 MG: 10 INJECTION, EMULSION INTRAVENOUS at 15:41

## 2024-03-18 RX ADMIN — CEFAZOLIN 2 G: 330 INJECTION, POWDER, FOR SOLUTION INTRAMUSCULAR; INTRAVENOUS at 15:49

## 2024-03-18 RX ADMIN — Medication 120 MCG: at 16:18

## 2024-03-18 RX ADMIN — FENTANYL CITRATE 50 MCG: 50 INJECTION, SOLUTION INTRAMUSCULAR; INTRAVENOUS at 15:41

## 2024-03-18 RX ADMIN — EPHEDRINE SULFATE 10 MG: 50 INJECTION, SOLUTION INTRAVENOUS at 16:04

## 2024-03-18 SDOH — HEALTH STABILITY: MENTAL HEALTH: CURRENT SMOKER: 0

## 2024-03-18 ASSESSMENT — PAIN - FUNCTIONAL ASSESSMENT
PAIN_FUNCTIONAL_ASSESSMENT: 0-10

## 2024-03-18 ASSESSMENT — PAIN SCALES - GENERAL
PAINLEVEL_OUTOF10: 7
PAINLEVEL_OUTOF10: 10 - WORST POSSIBLE PAIN
PAINLEVEL_OUTOF10: 7
PAINLEVEL_OUTOF10: 7

## 2024-03-18 ASSESSMENT — PAIN SCALES - PAIN ASSESSMENT IN ADVANCED DEMENTIA (PAINAD)
CONSOLABILITY: DISTRACTED OR REASSURED BY VOICE/TOUCH
BREATHING: NORMAL
FACIALEXPRESSION: FACIAL GRIMACING
TOTALSCORE: 5
BODYLANGUAGE: TENSE, DISTRESSED PACING, FIDGETING
NEGVOCALIZATION: OCCASIONAL MOAN/GROAN, LOW SPEECH, NEGATIVE/DISAPPROVING QUALITY

## 2024-03-18 ASSESSMENT — PAIN DESCRIPTION - LOCATION: LOCATION: ABDOMEN

## 2024-03-18 ASSESSMENT — PAIN DESCRIPTION - ORIENTATION: ORIENTATION: PROXIMAL

## 2024-03-18 ASSESSMENT — PAIN SCALES - WONG BAKER: WONGBAKER_NUMERICALRESPONSE: HURTS EVEN MORE

## 2024-03-18 NOTE — SIGNIFICANT EVENT
03/18/24 0957   Discharge Planning   Living Arrangements Spouse/significant other;Children   Support Systems Spouse/significant other;Children   Assistance Needed needs placement in skilled nursing facility at discharge   Type of Residence Private residence   Who is requesting discharge planning? Provider   Type of Post Acute Facility Services Skilled nursing   Patient expects to be discharged to: [facility] in [facility]   Does the patient need discharge transport arranged? Yes   [facility] coordination needed? Yes   Has discharge transport been arranged? No  (no discharge date yet)   Patient Choice   Provider Choice list and CMS website (https://medicare.gov/care-compare#search) for post-acute Quality and Resource Measure Data were provided and reviewed with: Family;Patient  (Gave list of [facility] contracted nursing homes to family and reviewed with patient.)   Patient / Family choosing to utilize agency / facility established prior to hospitalization No     Nurse Manager informed this sw that patient is being transferred to [facility] today to receive gall bladder surgery;  Received voice mail from wife that she has chosen [facility], [address] [place] [address]. (P. [phone]);  Contacted [facility] by [employer] message and informed them of the same and they will hold the bed.  Sw to follow.     Addendum: 14:52.  Patient to go to [facility] to have gall bladder surgery; then will [facility] to [facility] until [facility];  Emailed [facility] all updated clinicals, they are to start precert today, could not start until facility was chosen by family (wife just chose facility today);  [facility] states Prec[facility] could take a day  or 2;  Patient to go to [facility] after [facility], any day.  Sw to follow.

## 2024-03-18 NOTE — ANESTHESIA PROCEDURE NOTES
Airway  Date/Time: 3/18/2024 3:45 PM  Urgency: elective    Airway not difficult    Staffing  Performed: CRNA   Authorized by: FANI Palmer    Performed by: FANI Palmer  Patient location during procedure: OR    Indications and Patient Condition  Indications for airway management: anesthesia  Spontaneous Ventilation: absent  Sedation level: deep  Preoxygenated: yes  Patient position: sniffing  Mask difficulty assessment: 2 - vent by mask + OA or adjuvant +/- NMBA    Final Airway Details  Final airway type: endotracheal airway      Successful airway: ETT  Cuffed: yes   Successful intubation technique: video laryngoscopy  Facilitating devices/methods: intubating stylet  Endotracheal tube insertion site: oral  Blade type: TRELYS.  Blade size: #4  ETT size (mm): 7.5  Cormack-Lehane Classification: grade I - full view of glottis  Placement verified by: chest auscultation, capnometry and palpation of cuff   Cuff volume (mL): 10  Measured from: lips  ETT to lips (cm): 23  Number of attempts at approach: 1    Additional Comments  Lips remain in pre-anesthetic condition s/p intubation.

## 2024-03-18 NOTE — DISCHARGE SUMMARY
"Admission Date: 3-  Discharge Date: 3-      Reason For Admission: Suicidal Ideation       Discharge Diagnosis  1) Other Specified Depressive Disorder   2) Major Neurocognitive Disorder   3) Delirium, acute - resolved   4) HTN  5) COPD  6) ARTEMIO  7) Diverticulitis  8) GERD  9) left shoulder pain  10) Cholelithiasis          Initial Admission :   Donald Mcknight is a 73 y.o. year old male patient who was brought in by his wife and daughter due to a change in personality over the past month, and suicidal thoughts (asking for a gun stating \"I want to shoot myself\" and asking for a knife so he can kill himself - see EPAT note below). On admission, Donald was not able to be aroused to participate in the evaluation currently. Donald has been intermittently agitated (throwing objects including his clothing), taking his clothing off, and incontinent of urine and stool.            Per EPAT Assessment of 1-2024:  The patient is a 73 yr old male with no psychiatric history. He presents in the ED with concerns of chest pain and and family is concerned he is hallucinating. According to ED notes, the patient’s family believes that the patient may be hallucinating due to saying “odd and off the wall things” though he doesn’t appear to be showing signs of any hallucinating. The patient recently had covid about 2 weeks ago and has been having health difficulties since. The patient also has been refusing to eat recently with no medical complaints of nausea or vomiting. The patient scored low risk for suicide on the C-SSRS when assessed in the ED. The patient was referred to \[facility]\""T for psychiatric evaluation.      The patient is a 73 yr old male with a history of PTSD from war in the [place] . He presents in the ED with concerns of suicidal ideation and psychosis. The patient was cooperative throughout the EPAT evaluation.   The patient denies any SI/HI/VH. He endorses that he does hear voices sometimes. The patient " pointed to the corner of the ceiling and said, “For example, I hear people talking in that corner”. The patient endorsed that he was trying synthetic marijuana gummies but “threw them away”. He declines needing any substance use treatment. He also reports that he hasn’t had a drink of alcohol in 3 years. The patient did appear to be a little disoriented and reports that he “just needs to sleep because I haven’t slept in 8 days because of my restless leg syndrome”. He also reports that he doesn’t have an appetite and won’t eat. When asked if he isn’t eating because his stomach hurts the patient said “Yeah”. Then he said, “I just need to sleep”        Collateral: This writer spoke to the patient’s wife and daughter at the same time on the phone. They are highly worried about the patient. They report an acute change in personality and behavior within the past month. The patient did have covid 2 weeks ago but the acute change has been going on for a month now. They report that the patient has asked for a gun saying, “I want to shoot myself.” They also report him asking for a knife so that he could kill himself. The patient’s daughter and wife report that he has had flashbacks in the past but now they are a lot more frequent. The daughter and wife report that the patient has been yelling and screaming things at them like, “Get in the foxhole!” The patient’s daughter also reports that he has always “dressed to the nines” and took care of his hygiene. She reports a rapid decline in patient taking care of himself. She reports, “I don’t know who this person is. My dad needs help.” The patient’s wife reports that the patient does have a gun but it is in a safe and she has the key and that the patient does not know where the key is.        The patient scored low risk for suicide on the C-SSRS when assessed in the ED. The patient scored high risk for suicide on the C-SSRS after taking into consideration reports from the  "patient’s wife and daughter. The patient is also experiencing an acute change in personality/behavior. Dr. Koroma also reports that the patient has come in multiple times with chest pain and they have done all the medical work ups and testing for the patient and nothing medically has been found. The patient meets criteria for inpatient psychiatric admission. Watkins MD agrees with inpatient psychiatric admission.       Hospital Course:       Following admission to the [facility] at [facility], Donald was continued on his Fluoxetine 20 mg Qdaily, to help treat his depressive symptoms. Donald also attended unit groups to help with coping skills. At the time of discharge, Donald denied experiencing any hallucinations, paranoia, significant anxiety, manic symptoms, or symptoms of Major Depressive Disorder.    Donald had MoCA testing  done on 03- with a resultant score of14 out of 30 (Moderate Cognitive Impairment).           Mental Status Exam:   General: Appropriately groomed and dressed in hospital attire, laying in bed.   Appearance: Appears stated age.   Attitude: Calm, cooperative.   Behavior: Appropriate eye contact.   Motor Activity: No agitation or retardation. No EPS/TD. Normal gait and station. Normal muscle tone and bulk.   Speech: Regular rate, rhythm, volume and tone, spontaneous,  fluent. Non-pressured.   Mood: \"Better\"   Affect: Pleasant.   Thought Process: Mostly organized, and goal directed.   Thought Content: Does not endorse suicidal ideation or any suicide plans.   Does not endorse homicidal ideation.  No overt delusions or paranoia elicited.   Thought Perception: Does not endorse auditory or visual hallucinations, does not appear to be responding to hallucinatory stimuli.   Cognition: Alert, oriented x 3. Adequate fund of knowledge. Some deficits in recent and remote memory. Some deficits in attention and concentration. Intact language.  MoCA (03-) = 14 out of 30 (Moderate " "Cognitive Impairment).    Insight: Poor, as patient does not recognize many symptoms of illness and need for recommended treatments.    Judgment: Poor-to-fair, as patient can make reasonable decisions about ordinary activities of daily living and necessary medical care recommendations.            Risk Assessment at Discharge:  Donald is judged a minimal suicide risk due to: 1) No guns at home, 2) Denies any prior suicide attempts, 3) Denies any current suicidal ideation or suicide plans, 4) +plans for the future: \"... To get real healthy...,\" and 5) No symptoms of Major Depressive Disorder elicited at discharge.            Discharge Medications:  Scheduled medications  aspirin, 81 mg, oral, Daily  atorvastatin, 80 mg, oral, Nightly  calcium carbonate, 1,000 mg, oral, Daily  carvedilol, 12.5 mg, oral, BID  cholecalciferol, 1,000 Units, oral, Daily  donepezil, 10 mg, oral, Nightly  famotidine, 20 mg, oral, Daily  FLUoxetine, 20 mg, oral, Daily  indocyanine green, 2.5 mg, intravenous, Once  lidocaine, 1 patch, transdermal, Daily  [Held by provider] lisinopril, 10 mg, oral, Daily  nystatin, , Topical, BID  pantoprazole, 40 mg, oral, BID AC  perflutren lipid microspheres, 0.5-10 mL of dilution, intravenous, Once in imaging  sucralfate, 1 g, oral, 4x daily  tamsulosin, 0.4 mg, oral, Daily  traZODone, 100 mg, oral, Nightly      Continuous medications     PRN medications  PRN medications: acetaminophen, albuterol, alum-mag hydroxide-simeth, hydrOXYzine pamoate, melatonin, OLANZapine **OR** OLANZapine, oxygen, psyllium         I spent over 30 minutes in the preparation of this summary. All 11 elements of the transition record were discussed with the patient and or caregiver and the receiving inpatient facility (if applicable).  A copy of the transition record was given to the patient and was transmitted to the outpatient provider accepting the patient's care following  discharge.    Patient's illness, medication side " effects, benefits and risk were reviewed with the patient prior to discharge. The patient voiced understanding of their diagnosis, the medications recommended along with the importance of mediation compliance.  The patient was counseled not to stop medications without the supervision of a psychiatrist.  The patient was counseled to follow-up with their outpatient medical provider as indicated. The patient was counseled that if there was an increase in mental health issues, depression, anxiety, medication side effects, self harming thoughts or thoughts to harm others, to call [facility] or 911 and come to the nearest emergency room. The patient also received information regarding advanced mental and medical health directives during this hospitalization which they could discuss with their outpatient provider. The plan was discussed with the patient, the nurse and the social work department. The patient voiced understanding and agreement with the plan.  ------------------------------------------------------------------------------------------------------------------------------------------------------------------------------------------------------  Substance Use Risk Assessment:    Nicotine: Risks of continued tobacco use was addressed with the patient which included: inpatient education and counseling of the risks of oral, esophageal as well as other organ cancers (including oral, dermatological, gastric, pancreatic, respiratory) along with the ongoing risk of neurological and cardiovascular disease/events (strokes, angina). Treatment options for cessation were offered to include: alternate tobacco products, both inpatient/outpatient counseling. Replacement products were offered during this admission and prescribed at the time of discharge along with a referral to outpatient cessation counseling.    Alcohol: The increase in morbidity and mortality, financial, both interpersonal and physical health risk in direct  relationship to the use of alcohol ( in either a binge pattern or a sustained use over time) was discussed with the patient. Risks of intoxication, disinhibition, legal and interpersonal issues as well as abuse and dependence, along with the    increased risks of organ damage (cardiac, neurological, esophageal, gastric, liver, pancreatic, renal dysfunction among others) was discussed. The risks of decreased hepatic clearance and increased medication serum drug levels along with increase in potential medication side effects, was also discussed.   Options for treatment: Discussed was reduction in alcohol consumption, referral to dual diagnosis program, residential rehabilitation programs, AA, NA, DANIELE, gabapentin and oral naltrexone, if meets criteria as a candidate for these medications.    Street Drugs: Street drug use was addressed on admission, including both physical, mental, financial and psychological risk factors of ongoing use. There are no FDA prescribed treatment medications for cannabis, stimulants use/abuse (cocaine, PCP) or hallucinogens.  Patient was screened for concomitant other drugs used (tobacco, alcohol). Treatment options available were discussed ( if applicable) AA, NA, DANIELE, and outpatient dual diagnosis therapy, treatment programs. Patient voiced understanding of their treatment options.          Plan:  1) Continue current medications as prescribed at discharge.  2) Follow-up with outpatient surgery at [facility].            Palomo Tiwari MD

## 2024-03-18 NOTE — NURSING NOTE
Pt remains NPO for surgery today, alert and cooperative with care. Few ice chips given per patient request.

## 2024-03-18 NOTE — PROGRESS NOTES
Occupational Therapy     REHAB Therapy Assessment & Treatment    Patient Name: Donald Mcknight  MRN: [id number]  Today's Date: 3/18/2024      Activity Assessment:     Self-Discovery and Personal Understanding Group: [id number]  Ritchie Education and Identification Group: [id number]  Cognitive Task/ Team Collaboration Group: [id number]  Open Recreation Group: [id number]    0/4 Groups attended    Pt unable to attend any groups this date as pt prepping for Sx later this AM. Pt not appropriate to attend groups at this time. Pt would benefit from continued OT services in order to improve overall self-esteem, personal confidence and supports with increased awareness for safe transition location at discharge.        Encounter Problems                    OT Goals       Pt will demo ADL routine and meaningful daily activities with Marina using modifications as needed  (Not Progressing)       Start:  03/05/24    Expected End:  03/25/24            Patient will attend to therapeutic task for 10 minutes with minimum verbal cues demonstrating improved attention and participation in daily activities.  (Not Progressing)       Start:  03/05/24    Expected End:  03/25/24            Pt will demo functional transfers to/ from EOB, chair and commode with Marina and LRD (Not Progressing)       Start:  03/05/24    Expected End:  03/25/24            Pt will ID/ utilize 1-2 ways to increase balance of activity/ re-involve self in functional daily routines/roles prior to discharge.   (Not Progressing)       Start:  03/05/24    Expected End:  03/25/24            Pt will explore and ID 1-2 strategies to manage stressors/symptoms of illness/ grief more effectively prior to discharge.   (Not Progressing)       Start:  03/05/24    Expected End:  03/25/24                               Additional Comments:    HANKINS collaborated with patients nurse and charge nurse throughout the day to provide the appropriate support and encouragement to attend groups. Pt  up on unit when HANKINS left last group of the day. All needs met.

## 2024-03-18 NOTE — DISCHARGE INSTRUCTIONS
PATIENT INSTRUCTIONS  Cholecystectomy    FOLLOW-UP: Please call the office after being discharged to make a follow up appointment in 2-3 weeks. Call your physician immediately if you have any fevers greater than 103 degrees Fahrenheit, drainage from your wound that is not clear or looks infected, persistent bleeding, increasing abdominal pain,  or persistent nausea/vomiting.     WOUND CARE INSTRUCTIONS: Incisions are closed with absorbable sutures and covered with glue. Glue will fall off in about 2 weeks. If the wound becomes bright red and painful or starts to drain infected material that is not clear, please contact your physician immediately. If the wound is mildly pink and has a thick firm ridge underneath it, this is normal and is referred to as a healing ridge. This will resolve over the next 4-6 weeks. You are welcome to shower the day after surgery. Wash abdomen with warm, soapy water using your hand or gentle wash cloth. Pat dry. Do not submerge incisions in a bath tub or swimming pool for 2 weeks following surgery.    DRAIN: If your surgeon discharges you with a drain, you will need to empty it when it becomes 1/3 full. Empty it into a graduated specimen cup and record total outputs for the day. Bring these recordings to your follow up appointment. Strip your drain once a day to prevent clogging. Your drain will likely be removed in 1 week in office. The drain fluid should turn from a pink, watery fluid to a straw yellow, watery fluid. If it turns green, brown, dark red and thick or develops a bad odor, call our office immediately.     DIET: You may eat any foods that you can tolerate. We recommend following a bland, easily digestible diet for the first few days after surgery until your appetite improves. It is a good idea to eat a high fiber diet, and take in plenty of fluids to prevent constipation. Take Miralax daily if experiencing constipation after surgery until stools are a pudding like consistency  and easy to pass.     ACTIVITY: You are encouraged to cough and take deep breaths or use the incentive spirometry that was provided. This will help prevent respiratory complications and low grade fevers post-operatively. You may want to hug a pillow when coughing and sneezing to add additional support to the surgical area which will decrease pain during these times. You should not lift more than 10 pounds for 4 weeks after surgery as it could put you at increased risk for a post-operative hernia. We encourage frequent ambulation and getting out of bed as much as possible while you recover to improve your recovery process. Avoid strenuous activity for 4 weeks, which includes exercise that increases the heart rate, breathing rate, or makes you break a sweat.     MEDICATIONS: Plan to take Tylenol and Ibuprofen (if your physician approves) every 6 hours for the first week after surgery. Alternatively, you can alternate these two medications every three hours for the first week. You will be provided a short course of a narcotic medication to take for breakthrough pain as needed. You should not drive, make important decisions, or operate machinery when taking narcotic pain medication.    QUESTIONS: Please feel free to call your physician's office for any questions or concerns following surgery. Our office number is [phone].

## 2024-03-18 NOTE — PROGRESS NOTES
Physical Therapy                 Therapy Communication Note    Patient Name: Donald Mcknight  MRN: [id number]  Today's Date: 3/18/2024     Discipline: Physical Therapy    Missed Visit Reason: Missed Visit Reason: Parent refused, Patient sleeping (Pt. cleared for PT tx by RN who notes pt. is awaiting surgery this date for gall bladder removal.  Upon arrival, pt. sleeping soundly with O2 mask donned.  Pt. aroused to verbal and tactile cueing.  Pt. refused any ther ex or mobility despite  education)    Missed Time: Attempt    Comment:  RN notified.

## 2024-03-18 NOTE — ANESTHESIA POSTPROCEDURE EVALUATION
Patient: Donald Mcknight    Procedure Summary       Date: 03/18/24 Room / Location: [facility] / [facility]    Anesthesia Start: 1534 Anesthesia Stop: 1653    Procedure: LAPAROCOPIC CHOLECYSTECTOMY Diagnosis:       Chronic cholecystitis      (Chronic cholecystitis [K81.1])    Surgeons: Hema Carvalho MD Responsible Provider: FANI Palmer    Anesthesia Type: general ASA Status: 3            Anesthesia Type: general    Vitals Value Taken Time   /78 03/18/24 1649   Temp  03/18/24 1702   Pulse 83 03/18/24 1657   Resp  03/18/24 1702   SpO2 96 % 03/18/24 1657   Vitals shown include unvalidated device data.    Anesthesia Post Evaluation    Patient location during evaluation: PACU  Patient participation: complete - patient participated  Level of consciousness: awake  Pain management: adequate  Multimodal analgesia pain management approach  Airway patency: patent  Two or more strategies used to mitigate risk of obstructive sleep apnea  Cardiovascular status: acceptable  Respiratory status: acceptable  Hydration status: acceptable  Postoperative Nausea and Vomiting: none        There were no known notable events for this encounter.

## 2024-03-18 NOTE — NURSING NOTE
Pt leaving the unit at this time for surgery. Patient is in good spirits, IB=817/72, P=87, R=18, SPO2=97%, T=37.0. Wife notified at this time that patient is in route for surgery.

## 2024-03-18 NOTE — CONSULTS
"Reason For Consult  ***    History Of Present Illness  Donald Mcknight is a 73 y.o. male presenting with ***.     Past Medical History  He has no past medical history of CHF (congestive heart failure) (CMS/HCC), Diabetes mellitus (CMS/HCC), or Renal insufficiency.    Surgical History  He has no past surgical history on file.     Social History  He reports that he has quit smoking. His smoking use included cigarettes. He has never used smokeless tobacco. No history on file for alcohol use and drug use.    Family History  No family history on file.     Allergies  Eggs-apples-oats [nutritional supplement-fiber] and Penicillin    Review of Systems  ***     Physical Exam  ***     Last Recorded Vitals  Blood pressure 121/83, pulse 63, temperature 36.5 °C (97.7 °F), temperature source Temporal, resp. rate 18, height 1.854 m (6' 0.99\"), weight 102 kg (224 lb 6.9 oz), SpO2 91 %.    Relevant Results  ***     Assessment/Plan     ***        Kirstie Kirby RN    "

## 2024-03-18 NOTE — NURSING NOTE
Patient in bed lying awake,pleasant. Pt incontinent of urine, bed linen changed and patient cleaned up. Patient assists with turning and repositioning in the bed. Pt does complain of bed when repositioning in his abdominal area. Pt is aware he is going to surgery tomorrow to have his gallbladder removed. Pt is aware is he NPO after midnight. Patient is compliant with scheduled night meds and ate a snack before going to bed.

## 2024-03-18 NOTE — ANESTHESIA PREPROCEDURE EVALUATION
Patient: Donald Mcknight    Procedure Information       Date/Time: 03/18/24 1400    Procedure: LAPAROCOPIC CHOLECYSTECTOMY    Location: [facility] / [facility]    Surgeons: Hema Carvalho MD            Relevant Problems   Anesthesia (within normal limits)      Cardiovascular   (+) Essential (primary) hypertension   (+) Hyperlipidemia      Endocrine   (+) Obesity      GI   (+) Gastroesophageal reflux disease      /Renal   (+) Chronic renal insufficiency      Neuro/Psych   (+) Depression   (+) PTSD (post-traumatic stress disorder)      Pulmonary   (+) Chronic obstructive pulmonary disease (CMS/HCC)   (+) Dyspnea on exertion   (+) ARTEMIO (obstructive sleep apnea)      Musculoskeletal   (+) Osteoarthritis of left knee      Other   (+) Arthritis       Clinical information reviewed:       Med Hx             NPO Detail:  No data recorded   There were no vitals filed for this visit.    Past Surgical History:   Procedure Laterality Date    TIBIA PRAVEEN NAIL INSERTION Right      Past Medical History:   Diagnosis Date    Anorexia     CHF (congestive heart failure) (CMS/HCC) 3/18/2024    Cholelithiasis     COPD (chronic obstructive pulmonary disease) (CMS/[facility])     History of cigarette smoking, recently quit.    Hepatic steatosis     HLD (hyperlipidemia)     HTN (hypertension)     Restless leg syndrome     Rib fractures     Sleep apnea     Suicidal ideation 03/03/2024    Tibia fracture 07/19/2018    Right     No current facility-administered medications for this encounter.    Current Outpatient Medications:     albuterol 2.5 mg /3 mL (0.083 %) nebulizer solution, Take 3 mL (2.5 mg) by nebulization every 2 hours if needed for wheezing or shortness of breath (coughing, pt request)., Disp: 75 mL, Rfl: 11    aspirin 81 mg EC tablet, Take 1 tablet (81 mg) by mouth once daily. Do not start before March 16, 2024., Disp: 30 tablet, Rfl: 0    atorvastatin (Lipitor) 80 mg tablet, Take 1 tablet (80 mg) by mouth once daily at  bedtime., Disp: 30 tablet, Rfl: 0    calcium carbonate (Tums) 200 mg calcium chewable tablet, Chew 2 tablets (1,000 mg) once daily. Do not start before March 16, 2024., Disp: 60 tablet, Rfl: 0    carvedilol (Coreg) 12.5 mg tablet, Take 1 tablet (12.5 mg) by mouth 2 times a day., Disp: 60 tablet, Rfl: 0    cholecalciferol (Vitamin D-3) 25 MCG (1000 UT) tablet, Take 1 tablet (1,000 Units) by mouth once daily., Disp: 30 tablet, Rfl: 0    donepezil (Aricept) 10 mg tablet, Take 1 tablet (10 mg) by mouth once daily at bedtime., Disp: 30 tablet, Rfl: 0    famotidine (Pepcid) 20 mg tablet, Take 1 tablet (20 mg) by mouth once daily. Do not start before March 16, 2024., Disp: 30 tablet, Rfl: 0    FLUoxetine (PROzac) 20 mg capsule, Take 1 capsule (20 mg) by mouth once daily., Disp: 30 capsule, Rfl: 0    lidocaine 4 % patch, Place 1 patch over 12 hours on the skin once daily. Remove & discard patch within 12 hours or as directed by MD. Do not start before March 16, 2024., Disp: 30 patch, Rfl: 0    lisinopril 10 mg tablet, Take 1 tablet (10 mg) by mouth once daily. Do not start before March 16, 2024., Disp: 30 tablet, Rfl: 0    nystatin (Mycostatin) ointment, Apply topically 2 times a day., Disp: 10 g, Rfl: 0    oxygen (O2) gas therapy, Inhale 3 L/min once every 24 hours., Disp: 10 each, Rfl: 0    pantoprazole (ProtoNix) 40 mg EC tablet, Take 1 tablet (40 mg) by mouth once daily in the morning. Take before meals. Do not crush, chew, or split. Do not start before March 16, 2024., Disp: 30 tablet, Rfl: 0    pantoprazole (ProtoNix) 40 mg EC tablet, Take 1 tablet (40 mg) by mouth 2 times a day before meals. Do not crush, chew, or split., Disp: 60 tablet, Rfl: 0    sucralfate (Carafate) 100 mg/mL suspension, Take 10 mL (1 g) by mouth 4 times a day., Disp: 1200 mL, Rfl: 0    tamsulosin (Flomax) 0.4 mg 24 hr capsule, Take 1 capsule (0.4 mg) by mouth once daily., Disp: 30 capsule, Rfl: 0    traZODone (Desyrel) 100 mg tablet, Take 1  tablet (100 mg) by mouth once daily at bedtime., Disp: 30 tablet, Rfl: 1    Facility-Administered Medications Ordered in Other Encounters:     acetaminophen (Tylenol) tablet 650 mg, 650 mg, oral, q4h PRN, Palomo Tiwari MD, 650 mg at 03/17/24 2051    albuterol 2.5 mg /3 mL (0.083 %) nebulizer solution 2.5 mg, 2.5 mg, nebulization, q2h PRN, Palomo Tiwari MD    alum-mag hydroxide-simeth (Mylanta) 200-200-20 mg/5 mL oral suspension 30 mL, 30 mL, oral, q6h PRN, Palomo Tiwari MD, 30 mL at 03/17/24 1116    aspirin EC tablet 81 mg, 81 mg, oral, Daily, Teresa Myers APRN-CNP, 81 mg at 03/18/24 1043    atorvastatin (Lipitor) tablet 80 mg, 80 mg, oral, Nightly, Teresa Myers APRN-CNP, 80 mg at 03/17/24 2051    calcium carbonate (Tums) chewable tablet 1,000 mg, 1,000 mg, oral, Daily, Teresajoon Myers APRN-CNP, 1,000 mg at 03/18/24 1043    carvedilol (Coreg) tablet 12.5 mg, 12.5 mg, oral, BID, Alecia Roche, APRN-CNP, 12.5 mg at 03/18/24 1043    cholecalciferol (Vitamin D-3) tablet 1,000 Units, 1,000 Units, oral, Daily, Teresa Myers APRN-CNP, 1,000 Units at 03/18/24 1045    donepezil (Aricept) tablet 10 mg, 10 mg, oral, Nightly, Teresajoon Myers APRN-CNP, 10 mg at 03/17/24 2051    famotidine (Pepcid) tablet 20 mg, 20 mg, oral, Daily, Teresajoon Myers APRN-CNP, 20 mg at 03/18/24 1043    FLUoxetine (PROzac) capsule 20 mg, 20 mg, oral, Daily, Palomo Tiwari MD, 20 mg at 03/18/24 1045    hydrOXYzine pamoate (Vistaril) capsule 25 mg, 25 mg, oral, q4h PRN, Palomo Tiwari MD, 25 mg at 03/16/24 2020    indocyanine green (IC-Green) injection 2.5 mg, 2.5 mg, intravenous, Once, Hema Carvalho MD    lidocaine 4 % patch 1 patch, 1 patch, transdermal, Daily, Teresa Myers, APRN-CNP, 1 patch at 03/18/24 1044    [Held by provider] lisinopril tablet 10 mg, 10 mg, oral, Daily, Alecia Roche, APRN-CNP, 10 mg at 03/17/24 0810    melatonin tablet 5 mg, 5 mg, oral, Nightly PRN, Palomo Tiwari MD, 5 mg at 03/17/24 2051     nystatin (Mycostatin) ointment, , Topical, BID, ATA GarnettCNP, Given at 03/18/24 1046    OLANZapine (ZyPREXA) tablet 5 mg, 5 mg, oral, q6h PRN, 5 mg at 03/16/24 2314 **OR** OLANZapine (ZyPREXA) injection 5 mg, 5 mg, intramuscular, q6h PRN, Palomo Tiwari MD    oxygen (O2) therapy, , inhalation, Continuous PRN - O2/gases, SHARON Garnett-CNP, 2 L/min at 03/12/24 2300    pantoprazole (ProtoNix) EC tablet 40 mg, 40 mg, oral, BID AC, SHARON Garnett-CNP, 40 mg at 03/17/24 1619    perflutren lipid microspheres (Definity) injection 0.5-10 mL of dilution, 0.5-10 mL of dilution, intravenous, Once in imaging, ЕЛЕНА Garnett    psyllium (Hydrocil) packet 1 packet, 1 packet, oral, Daily PRN, Palomo Tiwari MD    sucralfate (Carafate) suspension 1 g, 1 g, oral, 4x daily, SHARON Garnett-CNP, 1 g at 03/17/24 2050    tamsulosin (Flomax) 24 hr capsule 0.4 mg, 0.4 mg, oral, Daily, SHARON Garnett-CNP, 0.4 mg at 03/18/24 1043    traZODone (Desyrel) tablet 100 mg, 100 mg, oral, Nightly, Whitney Hein MD, 100 mg at 03/17/24 2051  Prior to Admission medications    Medication Sig Start Date End Date Taking? Authorizing Provider   albuterol 2.5 mg /3 mL (0.083 %) nebulizer solution Inhale 3 mL (2.5 mg) every 4 hours.    Historical Provider, MD   albuterol 2.5 mg /3 mL (0.083 %) nebulizer solution Take 3 mL (2.5 mg) by nebulization every 2 hours if needed for wheezing or shortness of breath (coughing, pt request). 3/15/24   Palomo Tiwari MD   aspirin 81 mg EC tablet Take 1 tablet (81 mg) by mouth once daily. Do not start before March 16, 2024. 3/16/24 4/15/24  Palomo Tiwari MD   atorvastatin (Lipitor) 80 mg tablet Take 1 tablet (80 mg) by mouth once daily at bedtime. 3/15/24 4/14/24  Palomo Tiwari MD   azithromycin (Zithromax) 250 mg tablet Take 1 tablet (250 mg) by mouth once daily. 4/4/18   Historical Provider, MD   budesonide-formoteroL (Symbicort) 160-4.5 mcg/actuation inhaler  Inhale 2 puffs twice a day.    Historical Provider, MD   calcium carbonate (Tums) 200 mg calcium chewable tablet Chew 2 tablets (1,000 mg) once daily. Do not start before March 16, 2024. 3/16/24 4/15/24  Palomo Tiwari MD   carvedilol (Coreg) 12.5 mg tablet Take 1 tablet (12.5 mg) by mouth 2 times a day. 3/15/24 4/14/24  Palomo Tiwari MD   cholecalciferol (Vitamin D-3) 25 MCG (1000 UT) tablet Take 1 tablet (1,000 Units) by mouth once daily. 3/18/24 4/17/24  Palomo Tiwari MD   donepezil (Aricept ODT) 5 mg disintegrating tablet Take 1 tablet (5 mg) by mouth once daily at bedtime.    Historical Provider, MD   donepezil (Aricept) 10 mg tablet Take 1 tablet (10 mg) by mouth once daily at bedtime. 3/18/24 4/17/24  Palomo Tiwari MD   famotidine (Pepcid) 20 mg tablet Take 1 tablet (20 mg) by mouth once daily. Do not start before March 16, 2024. 3/16/24 4/15/24  Palomo Tiwari MD   FLUoxetine (PROzac) 20 mg capsule Take 1 capsule (20 mg) by mouth once daily. 3/15/24 4/14/24  Palomo Tiwari MD   lidocaine (Lidoderm) 5 % patch Place 1 patch over 24 hours on the skin once daily. 1/30/24   SHARON Chauhan-CNP   lidocaine 4 % patch Place 1 patch over 12 hours on the skin once daily. Remove & discard patch within 12 hours or as directed by MD. Do not start before March 16, 2024. 3/16/24   Palomo Tiwari MD   lisinopril 10 mg tablet Take 1 tablet (10 mg) by mouth once daily. Do not start before March 16, 2024. 3/16/24 4/15/24  Palomo Tiwari MD   lisinopril 5 mg tablet Take 1 tablet (5 mg) by mouth once daily.    Historical Provider, MD   meloxicam (Mobic) 15 mg tablet Take 1 tablet (15 mg) by mouth once daily. 3/6/23   Historical Provider, MD   methocarbamol (Robaxin) 500 mg tablet Take 1 tablet (500 mg) by mouth 2 times a day for 10 days. 1/30/24 2/9/24  SHARON Chauhan-CNP   nystatin (Mycostatin) ointment Apply topically 2 times a day. 3/15/24 4/14/24  Palomo Tiwari MD    omeprazole (PriLOSEC) 40 mg DR capsule Take 1 capsule (40 mg) by mouth once daily in the morning. Take before meals.    Historical Provider, MD   oxygen (O2) gas therapy Inhale 3 L/min once every 24 hours. 3/15/24   Palomo Tiwari MD   pantoprazole (ProtoNix) 40 mg EC tablet Take 1 tablet (40 mg) by mouth once daily in the morning. Take before meals. Do not crush, chew, or split. Do not start before March 16, 2024. 3/16/24 4/15/24  Palomo Tiwari MD   pantoprazole (ProtoNix) 40 mg EC tablet Take 1 tablet (40 mg) by mouth 2 times a day before meals. Do not crush, chew, or split. 3/18/24 4/17/24  Palomo Tiwari MD   sucralfate (Carafate) 100 mg/mL suspension Take 10 mL (1 g) by mouth 4 times a day. 3/18/24 4/17/24  Palomo Tiwari MD   tamsulosin (Flomax) 0.4 mg 24 hr capsule Take 1 capsule (0.4 mg) by mouth once daily.    Historical Provider, MD   tamsulosin (Flomax) 0.4 mg 24 hr capsule Take 1 capsule (0.4 mg) by mouth once daily. 3/18/24 4/17/24  Palomo Tiwari MD   traZODone (Desyrel) 100 mg tablet Take 1 tablet (100 mg) by mouth once daily at bedtime. 3/16/24 5/15/24  Whitney Hein MD   FLUoxetine (PROzac) 20 mg capsule Take 1 capsule (20 mg) by mouth once daily.  3/15/24  Historical Provider, MD   mirtazapine (Remeron) 15 mg tablet Take 1 tablet (15 mg) by mouth once daily at bedtime. 3/15/24 3/16/24  Palomo Tiwari MD     Allergies   Allergen Reactions    Eggs-Apples-Oats [Nutritional Supplement-Fiber] Anaphylaxis    Penicillin Anaphylaxis     Social History     Tobacco Use    Smoking status: Former     Types: Cigarettes    Smokeless tobacco: Never   Substance Use Topics    Alcohol use: Not on file         Chemistry    Lab Results   Component Value Date/Time     03/17/2024 1012    K 4.6 03/17/2024 1012     03/17/2024 1012    CO2 28 03/17/2024 1012    BUN 26 (H) 03/17/2024 1012    CREATININE 1.10 03/17/2024 1012    Lab Results   Component Value Date/Time    CALCIUM  8.2 (L) 03/17/2024 1012    ALKPHOS 83 03/17/2024 1012    AST 19 03/17/2024 1012    ALT 23 03/17/2024 1012    BILITOT 0.5 03/17/2024 1012          Lab Results   Component Value Date/Time    WBC 9.6 03/17/2024 1012    HGB 14.3 03/17/2024 1012    HCT 43.4 03/17/2024 1012     03/17/2024 1012     Lab Results   Component Value Date/Time    PROTIME 12.7 07/19/2018 0419    INR 1.1 07/19/2018 0419     Encounter Date: 03/02/24   ECG 12 lead   Result Value    Ventricular Rate 87    Atrial Rate 88    CT Interval 160    QRS Duration 99    QT Interval 399    QTC Calculation(Bazett) 480    P Axis 51    R Axis 40    T Axis -42    QRS Count 14    Q Onset 253    T Offset 453    QTC Fredericia 451    Narrative    Sinus rhythm  Abnormal R-wave progression, early transition  Borderline repolarization abnormality  Borderline prolonged QT interval    See ED provider note for full interpretation and clinical correlation  Confirmed by Lashell Beck ([id number]) on 3/12/2024 3:33:22 PM     No results found for this or any previous visit from the past 1095 days.   Physical Exam    Airway  Mallampati: II  TM distance: >3 FB  Neck ROM: full     Cardiovascular - normal exam     Dental        Pulmonary - normal exam     Abdominal            Anesthesia Plan    History of general anesthesia?: yes  History of complications of general anesthesia?: no    ASA 3     general     The patient is not a current smoker.    intravenous induction   Anesthetic plan and risks discussed with patient.  Use of blood products discussed with patient who.    Plan discussed with attending.

## 2024-03-18 NOTE — NURSING NOTE
Patient has returned from PACU, report received from Sheila. Pt had Lap Yanique 4 small incisions that are glued. Pt is able to shower . No ointment to be applied to area. Pt is alert per usual. Incisions are dry and intact. Will monitor.

## 2024-03-18 NOTE — NURSING NOTE
"Spoke with \" Yuki\" in Pre Op about patient's morning medications. Pt is currently NPO for surgery today , this nurse reviewed morning medications. OK to give all medications except Lisinopril this morning.  "

## 2024-03-18 NOTE — CARE PLAN
"The patient's goals for the shift include \"I'm having surgery tomorrow\"    The clinical goals for the shift include maintain patient safety    Over the shift, the patient did make progress toward the following goals    Problem: Safety  Goal: Patient will be injury free during hospitalization  Outcome: Progressing  Goal: I will remain free of falls  Outcome: Progressing     Problem: Daily Care  Goal: Daily care needs are met  Outcome: Progressing     Problem: Psychosocial Needs  Goal: Demonstrates ability to cope with hospitalization/illness  Outcome: Progressing  Goal: Collaborate with me, my family, and caregiver to identify my specific goals  Outcome: Progressing     Problem: Discharge Barriers  Goal: My discharge needs are met  Outcome: Progressing     Problem: Skin  Goal: Decreased wound size/increased tissue granulation at next dressing change  Outcome: Progressing  Goal: Participates in plan/prevention/treatment measures  Outcome: Progressing  Goal: Prevent/manage excess moisture  Outcome: Progressing  Goal: Prevent/minimize sheer/friction injuries  Outcome: Progressing  Goal: Promote/optimize nutrition  Outcome: Progressing  Goal: Promote skin healing  Outcome: Progressing     Problem: Fall/Injury  Goal: Not fall by end of shift  Outcome: Progressing  Goal: Be free from injury by end of the shift  Outcome: Progressing  Goal: Verbalize understanding of personal risk factors for fall in the hospital  Outcome: Progressing  Goal: Verbalize understanding of risk factor reduction measures to prevent injury from fall in the home  Outcome: Progressing  Goal: Use assistive devices by end of the shift  Outcome: Progressing  Goal: Pace activities to prevent fatigue by end of the shift  Outcome: Progressing     Problem: Pain  Goal: Takes deep breaths with improved pain control throughout the shift  Outcome: Progressing  Goal: Turns in bed with improved pain control throughout the shift  Outcome: Progressing  Goal: Walks " with improved pain control throughout the shift  Outcome: Progressing  Goal: Performs ADL's with improved pain control throughout shift  Outcome: Progressing  Goal: Participates in PT with improved pain control throughout the shift  Outcome: Progressing  Goal: Free from opioid side effects throughout the shift  Outcome: Progressing  Goal: Free from acute confusion related to pain meds throughout the shift  Outcome: Progressing

## 2024-03-19 PROCEDURE — 97530 THERAPEUTIC ACTIVITIES: CPT | Mod: GP

## 2024-03-19 PROCEDURE — 2500000001 HC RX 250 WO HCPCS SELF ADMINISTERED DRUGS (ALT 637 FOR MEDICARE OP): Performed by: NURSE PRACTITIONER

## 2024-03-19 PROCEDURE — 1240000001 HC SEMI-PRIVATE BH ROOM DAILY

## 2024-03-19 PROCEDURE — 2500000002 HC RX 250 W HCPCS SELF ADMINISTERED DRUGS (ALT 637 FOR MEDICARE OP, ALT 636 FOR OP/ED): Performed by: NURSE PRACTITIONER

## 2024-03-19 PROCEDURE — 97110 THERAPEUTIC EXERCISES: CPT | Mod: GP

## 2024-03-19 PROCEDURE — 99232 SBSQ HOSP IP/OBS MODERATE 35: CPT | Performed by: PSYCHIATRY & NEUROLOGY

## 2024-03-19 PROCEDURE — 2500000005 HC RX 250 GENERAL PHARMACY W/O HCPCS: Performed by: NURSE PRACTITIONER

## 2024-03-19 RX ORDER — PANTOPRAZOLE SODIUM 40 MG/1
40 TABLET, DELAYED RELEASE ORAL
Status: DISCONTINUED | OUTPATIENT
Start: 2024-03-19 | End: 2024-03-20 | Stop reason: HOSPADM

## 2024-03-19 RX ORDER — FAMOTIDINE 20 MG/1
20 TABLET, FILM COATED ORAL NIGHTLY PRN
Status: DISCONTINUED | OUTPATIENT
Start: 2024-03-19 | End: 2024-03-20 | Stop reason: HOSPADM

## 2024-03-19 RX ORDER — LISINOPRIL 5 MG/1
10 TABLET ORAL DAILY
Status: DISCONTINUED | OUTPATIENT
Start: 2024-03-19 | End: 2024-03-20 | Stop reason: HOSPADM

## 2024-03-19 RX ADMIN — CARVEDILOL 12.5 MG: 12.5 TABLET, FILM COATED ORAL at 20:51

## 2024-03-19 RX ADMIN — CALCIUM CARBONATE (ANTACID) CHEW TAB 500 MG 1000 MG: 500 CHEW TAB at 09:45

## 2024-03-19 RX ADMIN — FLUOXETINE HYDROCHLORIDE 20 MG: 20 CAPSULE ORAL at 09:46

## 2024-03-19 RX ADMIN — ACETAMINOPHEN 650 MG: 325 TABLET ORAL at 05:17

## 2024-03-19 RX ADMIN — TAMSULOSIN HYDROCHLORIDE 0.4 MG: 0.4 CAPSULE ORAL at 09:46

## 2024-03-19 RX ADMIN — ACETAMINOPHEN 650 MG: 325 TABLET ORAL at 20:51

## 2024-03-19 RX ADMIN — OXYCODONE HYDROCHLORIDE 5 MG: 5 TABLET ORAL at 05:17

## 2024-03-19 RX ADMIN — ACETAMINOPHEN 650 MG: 325 TABLET ORAL at 09:48

## 2024-03-19 RX ADMIN — LISINOPRIL 10 MG: 5 TABLET ORAL at 09:46

## 2024-03-19 RX ADMIN — TRAZODONE HYDROCHLORIDE 100 MG: 100 TABLET ORAL at 01:02

## 2024-03-19 RX ADMIN — DONEPEZIL HYDROCHLORIDE 10 MG: 5 TABLET ORAL at 20:51

## 2024-03-19 RX ADMIN — NYSTATIN: 100000 OINTMENT TOPICAL at 20:49

## 2024-03-19 RX ADMIN — PANTOPRAZOLE SODIUM 40 MG: 40 TABLET, DELAYED RELEASE ORAL at 06:24

## 2024-03-19 RX ADMIN — ATORVASTATIN CALCIUM 80 MG: 80 TABLET, FILM COATED ORAL at 20:52

## 2024-03-19 RX ADMIN — ACETAMINOPHEN 650 MG: 325 TABLET ORAL at 14:09

## 2024-03-19 RX ADMIN — TRAZODONE HYDROCHLORIDE 100 MG: 100 TABLET ORAL at 20:51

## 2024-03-19 RX ADMIN — TRAMADOL HYDROCHLORIDE 50 MG: 50 TABLET, COATED ORAL at 01:03

## 2024-03-19 RX ADMIN — ATORVASTATIN CALCIUM 80 MG: 80 TABLET, FILM COATED ORAL at 01:03

## 2024-03-19 RX ADMIN — LIDOCAINE 1 PATCH: 4 PATCH TOPICAL at 09:46

## 2024-03-19 RX ADMIN — ASPIRIN 81 MG: 81 TABLET, COATED ORAL at 09:46

## 2024-03-19 RX ADMIN — IBUPROFEN 600 MG: 600 TABLET, FILM COATED ORAL at 23:41

## 2024-03-19 RX ADMIN — ACETAMINOPHEN 650 MG: 325 TABLET ORAL at 05:16

## 2024-03-19 RX ADMIN — CARVEDILOL 12.5 MG: 12.5 TABLET, FILM COATED ORAL at 01:01

## 2024-03-19 RX ADMIN — IBUPROFEN 600 MG: 600 TABLET, FILM COATED ORAL at 17:45

## 2024-03-19 RX ADMIN — IBUPROFEN 600 MG: 600 TABLET, FILM COATED ORAL at 12:30

## 2024-03-19 RX ADMIN — Medication 5 MG: at 20:52

## 2024-03-19 RX ADMIN — Medication 1000 UNITS: at 09:46

## 2024-03-19 RX ADMIN — DONEPEZIL HYDROCHLORIDE 10 MG: 5 TABLET ORAL at 01:03

## 2024-03-19 RX ADMIN — CARVEDILOL 12.5 MG: 12.5 TABLET, FILM COATED ORAL at 09:46

## 2024-03-19 ASSESSMENT — COGNITIVE AND FUNCTIONAL STATUS - GENERAL
MOVING FROM LYING ON BACK TO SITTING ON SIDE OF FLAT BED WITH BEDRAILS: A LOT
TURNING FROM BACK TO SIDE WHILE IN FLAT BAD: A LOT
MOVING TO AND FROM BED TO CHAIR: TOTAL
MOBILITY SCORE: 8
STANDING UP FROM CHAIR USING ARMS: TOTAL
CLIMB 3 TO 5 STEPS WITH RAILING: TOTAL
WALKING IN HOSPITAL ROOM: TOTAL

## 2024-03-19 ASSESSMENT — PAIN DESCRIPTION - ORIENTATION
ORIENTATION: MID
ORIENTATION: UPPER

## 2024-03-19 ASSESSMENT — PAIN SCALES - GENERAL
PAINLEVEL_OUTOF10: 6
PAINLEVEL_OUTOF10: 0 - NO PAIN
PAINLEVEL_OUTOF10: 10 - WORST POSSIBLE PAIN
PAINLEVEL_OUTOF10: 6
PAINLEVEL_OUTOF10: 6

## 2024-03-19 ASSESSMENT — PAIN SCALES - PAIN ASSESSMENT IN ADVANCED DEMENTIA (PAINAD)
PAINAD SCORE: 4
CONSOLABILITY: NO NEED TO CONSOLE
CONSOLABILITY: OCCASIONAL MOAN/GROAN, LOW SPEECH, NEGATIVE/DISAPPROVING QUALITY
NEGVOCALIZATION: OCCASIONAL MOAN/GROAN, LOW SPEECH, NEGATIVE/DISAPPROVING QUALITY
BODYLANGUAGE: RELAXED
BREATHING: OCCASIONAL LABORED BREATHING, SHORT PERIOD OF HYPERVENTILATION
BREATHING: NORMAL
BODYLANGUAGE: RELAXED
FACIALEXPRESSION: FACIAL GRIMACING
CONSOLABILITY: NO NEED TO CONSOLE

## 2024-03-19 ASSESSMENT — PAIN - FUNCTIONAL ASSESSMENT
PAIN_FUNCTIONAL_ASSESSMENT: 0-10

## 2024-03-19 ASSESSMENT — PAIN SCALES - WONG BAKER: WONGBAKER_NUMERICALRESPONSE: HURTS EVEN MORE

## 2024-03-19 ASSESSMENT — PAIN DESCRIPTION - LOCATION
LOCATION: ABDOMEN
LOCATION: ABDOMEN

## 2024-03-19 NOTE — CARE PLAN
"The patient's goals for the shift include \" get the hell out of here\"    The clinical goals for the shift include maintain safety    Over the shift, the patient made progress toward the following goals of maintaining safety.  Barriers to progression include confusion. Recommendations to address these barriers include continue medications as ordered. .    "

## 2024-03-19 NOTE — CARE PLAN
"The patient's goals for the shift include \"Go home tomorrow\"    The clinical goals for the shift include Maintain medication compliance and for patient to remain injury free    Donald slept well at medium intervals during the night.  "

## 2024-03-19 NOTE — NURSING NOTE
Donald was placed back in his bed after recovering from gall bladder removal surgery in the PACU.  He was smiling and denied having any anxiety, depression, SI, HI or hallucinations, but rated his abdominal pain as 7-8/10.  He received Oxycodone 5 mg, by mouth along with Acetaminophen 650 mg, also by mouth.  His bedtime medications were held for several hours when he first returned to the U, then they were to be given, time was ~ 0100 on 03/19/2024, he was compliant with taking all of them.    0134 - Donald continued to complain of abdominal pain of 6/10, I gave him Tramadol 50 mg, by mouth for further abdominal pain relief.    0335 - Donald continues to sleep.

## 2024-03-19 NOTE — GROUP NOTE
"Group Topic: Cognitive Focus   Group Date: 3/19/2024  Start Time: 1400  End Time: 1450  Facilitators: TAN Dos Santos   Department: [facility] REHAB THERAPY VIRTUAL    Number of Participants: 7   Group Focus: concentration, leisure skills, and problem solving  Treatment Modality: Other: Recreational Therapy  Interventions utilized were exploration, leisure development, mental fitness, and problem solving  Purpose: other: leisure awareness, cognitive skills/focus, fine motor, social engagement, teamwork, healthy competition     Name: Donald Mcknight YOB: 1950   MR: [id number]      Facilitator: Recreational Therapist  Level of Participation: did not attend  Progress: None  Comments: In this Recreational Therapy session, patients engaged in \"The Uzzle\" game, fostering a collaborative and interactive environment. The activity aimed to enhance socialization, cognitive and fine motor skills, and teamwork among patients, promoting a positive therapeutic experience.    Patient declined invitation to group activity at this time. Patient will continue to be provided with opportunities to enhance leisure skills and/or coping mechanisms.    Plan: continue with services      "

## 2024-03-19 NOTE — PROGRESS NOTES
Donald Mcknight is a 73 y.o. male on day 15 of admission presenting with Depression with suicidal ideation.      Subjective   POD 0 from cholecystectomy. Pain is 8/10 near incision sites, but says it is much improved from previous. He denies nausea. No chest pain. No other complaints in the ROS.     Objective     Last Recorded Vitals  /79 (Patient Position: Lying)   Pulse 96   Temp 36.5 °C (97.7 °F) (Temporal)   Resp 18   Wt 102 kg (224 lb 6.9 oz)   SpO2 96%   Intake/Output last 3 Shifts:    Intake/Output Summary (Last 24 hours) at 3/18/2024 2314  Last data filed at 3/18/2024 1910  Gross per 24 hour   Intake 1400 ml   Output 5 ml   Net 1395 ml       Admission Weight  Weight: 117 kg (257 lb 15 oz) (03/04/24 0700)    Daily Weight  03/17/24 : 102 kg (224 lb 6.9 oz)    Image Results  ECG 12 lead  Sinus rhythm  Abnormal R-wave progression, early transition  Borderline repolarization abnormality  Borderline prolonged QT interval    See ED provider note for full interpretation and clinical correlation  Confirmed by Lashell Beck [id number]) on 3/12/2024 3:33:22 PM  ECG 12 lead  See ED provider note for full interpretation and clinical correlation  Confirmed by Lashell Beck ([id number]) on 3/12/2024 10:27:51 AM      Physical Exam  Constitutional:       General: He is not in acute distress.     Appearance: Normal appearance. He is not ill-appearing, toxic-appearing or diaphoretic.      Comments: +overweight   HENT:      Head: Normocephalic and atraumatic.      Mouth/Throat:      Mouth: Mucous membranes are moist.      Pharynx: Oropharynx is clear.   Eyes:      Extraocular Movements: Extraocular movements intact.      Conjunctiva/sclera: Conjunctivae normal.   Cardiovascular:      Rate and Rhythm: Normal rate and regular rhythm.      Pulses: Normal pulses.      Heart sounds: Normal heart sounds. No murmur heard.  Pulmonary:      Effort: Pulmonary effort is normal.      Breath sounds: Normal breath sounds.   Abdominal:       General: Bowel sounds are normal.      Palpations: Abdomen is soft.      Tenderness: There is abdominal tenderness. There is no guarding.      Comments: Lap sites with dermabond and no drainage or erythema   Musculoskeletal:         General: No swelling. Normal range of motion.      Cervical back: Normal range of motion.      Right lower leg: No edema.      Left lower leg: No edema.   Skin:     General: Skin is warm and dry.      Coloration: Skin is pale.   Neurological:      General: No focal deficit present.      Mental Status: He is alert. Mental status is at baseline.      Motor: Weakness present.      Comments: +generalized weakness   Psychiatric:         Mood and Affect: Mood normal.         Behavior: Behavior normal.       Relevant Results  Scheduled medications  Scheduled medications  acetaminophen, 650 mg, oral, q6h  aspirin, 81 mg, oral, Daily  atorvastatin, 80 mg, oral, Nightly  calcium carbonate, 1,000 mg, oral, Daily  carvedilol, 12.5 mg, oral, BID  cholecalciferol, 1,000 Units, oral, Daily  donepezil, 10 mg, oral, Nightly  famotidine, 20 mg, oral, Daily  FLUoxetine, 20 mg, oral, Daily  ibuprofen, 600 mg, oral, q6h BEN  lidocaine, 1 patch, transdermal, Daily  lisinopril, 10 mg, oral, Daily  nystatin, , Topical, BID  pantoprazole, 40 mg, oral, BID AC  perflutren lipid microspheres, 0.5-10 mL of dilution, intravenous, Once in imaging  polyethylene glycol, 17 g, oral, Daily  tamsulosin, 0.4 mg, oral, Daily  traZODone, 100 mg, oral, Nightly      Continuous medications     PRN medications  PRN medications: acetaminophen, albuterol, alum-mag hydroxide-simeth, hydrOXYzine pamoate, melatonin, OLANZapine **OR** OLANZapine, oxyCODONE, oxygen, psyllium, traMADol    Assessment/Plan   S POD 0 from Cholecystectomy/Chololithiasis  NPO except sips  Pain control/OARRS  F/U with surgery outpt as recommended    Psychosis/Hallucinations/Delusions/HX PTSD/HS Depression  Psych primary and to f/u regarding routine labs  ordered  5 mg additional Zyprexa  Rec a MOCA assessment-scored 14/30  Supportive care     Hypokalemia  resolved     Chest Pain, Cause Unknown/HX CHF  -Resolved  Cardiology consult-appreciate recs; Needs to f/u outpt with cards for stress test and wife aware     GERD  PPI decreased to daily  Stopped carafate  Continue pepcid PRN     Diverticulitis  -Resolved     Inability to ambulate  -Gradual since over 3 mo ago  -Likely from worsening dementia and new finding of old stroke on head CT  -No evidence of NPH on CT  Discussed with wife  Plan to go to rehab     Gen Weakness  PT/OT/SW  Will need rehab placement     Intertrigo  Nystatin     HTN   Lisinopril     HPLD  Statin  Lipid panel reviewed     ARTEMIO/COPD 2-3 L NC at baseline  CPAP, oxygen, pulse ox  PRN albuterol     Obesity  Wt loss encouraged     Incidental CT Findings  Stable enlarged infraclavicular lymph nodes/3-4 pulm nodules-F/U pulmonary outpt     Inability to Ambulate  PT/OT/SW  Supposed     Old Stroke on CT  Already on aspirin or statin     Dementia  -Been forgetful and having hallucinations per wife  -MOCA 14/30  Was on Donepezil 5 mg at home and increased to 10 mg  Needs to f/u with psych on dc who started the medication     BPH  Flomax resumed     Vitamin D Deficiency  Vitamin D replacements resumed, taking at home  Vit D level WNL     DVT PX  Encourage ambulation    Teresa Myers, APRN-CNP

## 2024-03-19 NOTE — GROUP NOTE
Group Topic: Dialectical Behavioral Therapy - Distress Tolerance   Group Date: 3/19/2024  Start Time: 1600  End Time: 1650  Facilitators: TAN Dos Santos; TAN Bolaños   Department: [facility] REHAB THERAPY VIRTUAL    Number of Participants: 8   Group Focus: coping skills, relapse prevention, and substance abuse education  Treatment Modality: Recreation Therapy  Interventions utilized were exploration, mental fitness, and support  Purpose: coping skills, insight or knowledge, and relapse prevention strategies    Name: Donald Mcknight YOB: 1950   MR: [id number]      Facilitator: Recreational Therapist  Level of Participation: did not attend  Progress: Minimal  Comments: This session provided education using Dialectical Behavioral Therapy Distress Tolerance skills focusing on “Dialectical Abstinence”. Participants were provided opportunities to share thoughts and ask questions related to the topics being discussed. The group was provided multiple examples of “abstinence” and “harm reduction”. The session also included understanding coping techniques to practice both addictive behavior management techniques.   Plan: continue with services

## 2024-03-19 NOTE — NURSING NOTE
"Pt was assessed this morning while he was laying in bed. He presents as pleasant and cooperative, and is engaged in conversation. Pt states he \" slept good\" last night and is glad surgery is over. He does endorse abdominal discomfort and rates it between 6-7/ 10 and he has scheduled pain medication. 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 No drainage present at the site of abdominal incisions. He rates anxiety 2/10 and depression 6-7/10 with no SI/HI or AVH reported. Pt has been incontinent of urine x 2 today and states he is unable to tell when he has to urinate. His appetite has been good and his last BM was 3/18. Will continue to monitor for safety.  "

## 2024-03-19 NOTE — PROGRESS NOTES
"Donald Mcknight is a 73 y.o. year old male patient who is on U admission day 15.      Subjective   Donald Mcknight is a 73 y.o. year old male patient who was personally seen and interviewed, and discussed in morning team rounds. The patient was interviewed in his room alone (interviewed laying in bed, awake and alert), and was easily engaged and cooperative. This afternoon, Donald reports feeling \"better\" and currently rates his depression at a 3 out of 10. No current suicidal ideation or plans were elicited. He also rates his anxiety at a 3-4 out of 10. No overt hallucinations or paranoia were endorsed or noted.   Donald slept 6.5 hour last night (broken) as his Mirtazapine was discontinued in favor of Trazodone for sleep over the weekend.         Objective   Mental Status Exam:   General: Appropriately groomed and partially dressed in hospital attire, laying in bed, awake and alert.   Appearance: Appears stated age.   Attitude: Calm, cooperative.   Behavior: Good eye contact.   Motor Activity: No agitation or retardation. No EPS/TD. Impaired gait and station. Normal muscle tone and bulk.   Speech: Regular rate, rhythm, volume and tone, spontaneous, fluent. Non-pressured.   Mood: \"Better\"   Affect: Pleasant.   Thought Process: Mostly organized, and goal directed.   Thought Content: Does not currently endorse suicidal ideation or any suicide plans.   Does not endorse homicidal ideation.  No overt delusions or paranoia elicited.    Thought Perception: Does not endorse auditory or visual hallucinations (see HPI).   Cognition: Alert, oriented x 3 currently. Adequate fund of knowledge. Some deficit in recent and remote memory. Some deficits in attention and   Concentration. Intact language.  MoCA (03-) = 14 out of 30 (Moderate Cognitive Impairment).   Insight: Poor-to-fair, as patient recognizes symptoms of  illness and need for recommended treatments.    Judgment: Poor-to-fair, as patient can make reasonable " decisions about ordinary activities of daily living and necessary medical care recommendations.       LABS:  No results found for this or any previous visit (from the past 24 hour(s)).       Last Recorded Vitals  Visit Vitals  /79 (Patient Position: Lying)   Pulse 96   Temp 36.5 °C (97.7 °F) (Temporal)   Resp 18        Intake/Output last 3 Shifts:  No intake/output data recorded.    Relevant Results  Scheduled medications  acetaminophen, 650 mg, oral, q6h  [MAR Hold - Suspended Admission] aspirin, 81 mg, oral, Daily  [MAR Hold - Suspended Admission] atorvastatin, 80 mg, oral, Nightly  calcium carbonate, 1,000 mg, oral, Daily  [MAR Hold - Suspended Admission] carvedilol, 12.5 mg, oral, BID  [MAR Hold - Suspended Admission] cholecalciferol, 1,000 Units, oral, Daily  [MAR Hold - Suspended Admission] donepezil, 10 mg, oral, Nightly  famotidine, 20 mg, oral, Daily  FLUoxetine, 20 mg, oral, Daily  ibuprofen, 600 mg, oral, q6h BEN  [MAR Hold - Suspended Admission] lidocaine, 1 patch, transdermal, Daily  [Held by provider] lisinopril, 10 mg, oral, Daily  nystatin, , Topical, BID  [MAR Hold - Suspended Admission] pantoprazole, 40 mg, oral, BID AC  [MAR Hold - Suspended Admission] perflutren lipid microspheres, 0.5-10 mL of dilution, intravenous, Once in imaging  polyethylene glycol, 17 g, oral, Daily  [MAR Hold - Suspended Admission] sucralfate, 1 g, oral, 4x daily  [MAR Hold - Suspended Admission] tamsulosin, 0.4 mg, oral, Daily  [MAR Hold - Suspended Admission] traZODone, 100 mg, oral, Nightly      Continuous medications     PRN medications  PRN medications: acetaminophen, [MAR Hold - Suspended Admission] albuterol, alum-mag hydroxide-simeth, hydrOXYzine pamoate, melatonin, OLANZapine **OR** OLANZapine, oxyCODONE, oxygen, psyllium, traMADol               Assessment/Plan   1) Other Specified Depressive Disorder       Plan: 1) Fluoxetine 20 mg Qdaily (continue)                2) Discontinued Mirtazapine 7.5 -> 15 mg at  bedtime (sleep)                3) Trazodone 100 mg at bedtime.     Discussed potential risks, benefits, and alternatives to medications with patient, who consented to the above medications.     2) Major Neurocognitive Disorder       Plan: 1) MoCA (03-) = 14 out of 30 (Moderate Cognitive Impairment).    3) Delirium, acute - resolved       Plan: 1) Discontinue trial Depakote 250 -> 125 > 0.0 mg Q17:00 and Q21:00     4) HTN       Plan: 1) IM service to follow and manage.     5) COPD       Plan: 1) IM service to follow and manage.     6) ARTEMIO       Plan: 1) IM service to follow and manage.     7) Diverticulitis       Plan: 1) IM service to follow and manage.     8) GERD       Plan: 1) IM service to follow and manage.    9) Right hip pain       Plan: 1) IM service to follow and manage.    10) Cholelithiasis       Plan: 1) pending cholecystectomy.            Palomo Tiwari MD

## 2024-03-19 NOTE — PROGRESS NOTES
"Donald cMknight is a 73 y.o. year old male patient who is on U admission day 16.      Subjective   Donald Mcknight is a 73 y.o. year old male patient who was personally seen and interviewed, and discussed in morning team rounds. The patient was interviewed in his room alone (interviewed laying in bed, awake and alert), and was easily engaged and cooperative. This morning, Donald reports feeling \"okay,\" presents as pleasant, and currently rates his depression at a 5 out of 10. No current suicidal ideation or plans were elicited. He also rates his anxiety at a 0 out of 10. No overt hallucinations or paranoia were endorsed or noted.   Donald slept 5.75 hour last night (broken).  The patient's cholecystectomy surgery went well yesterday, and only reports minimal pain around his surgical puncture sites.         Objective   Mental Status Exam:   General: Appropriately groomed and partially dressed in hospital attire, laying in bed, awake and alert.   Appearance: Appears stated age.   Attitude: Calm, cooperative.   Behavior: Good eye contact.   Motor Activity: No agitation or retardation. No EPS/TD. Impaired gait and station. Normal muscle tone and bulk.   Speech: Regular rate, rhythm, volume and tone, spontaneous, fluent. Non-pressured.   Mood: \"Okay\"   Affect: Pleasant.   Thought Process: Mostly organized, and goal directed.   Thought Content: Does not currently endorse suicidal ideation or any suicide plans.   Does not endorse homicidal ideation.  No overt delusions or paranoia elicited.    Thought Perception: Does not endorse auditory or visual hallucinations (see HPI).   Cognition: Alert, oriented x 3 currently. Adequate fund of knowledge. Some deficit in recent and remote memory. Some deficits in attention and   Concentration. Intact language.  MoCA (03-) = 14 out of 30 (Moderate Cognitive Impairment).   Insight: Poor-to-fair, as patient recognizes symptoms of  illness and need for recommended treatments.  "   Judgment: Poor-to-fair, as patient can make reasonable decisions about ordinary activities of daily living and necessary medical care recommendations.       LABS:  No results found for this or any previous visit (from the past 24 hour(s)).       Last Recorded Vitals  Visit Vitals  /72 (BP Location: Right arm, Patient Position: Sitting)   Pulse 89   Temp 37.1 °C (98.8 °F) (Temporal)   Resp 20        Intake/Output last 3 Shifts:  No intake/output data recorded.    Relevant Results  Scheduled medications  acetaminophen, 650 mg, oral, q6h  aspirin, 81 mg, oral, Daily  atorvastatin, 80 mg, oral, Nightly  calcium carbonate, 1,000 mg, oral, Daily  carvedilol, 12.5 mg, oral, BID  cholecalciferol, 1,000 Units, oral, Daily  donepezil, 10 mg, oral, Nightly  FLUoxetine, 20 mg, oral, Daily  ibuprofen, 600 mg, oral, q6h BEN  lidocaine, 1 patch, transdermal, Daily  lisinopril, 10 mg, oral, Daily  nystatin, , Topical, BID  pantoprazole, 40 mg, oral, Daily before breakfast  perflutren lipid microspheres, 0.5-10 mL of dilution, intravenous, Once in imaging  polyethylene glycol, 17 g, oral, Daily  tamsulosin, 0.4 mg, oral, Daily  traZODone, 100 mg, oral, Nightly      Continuous medications     PRN medications  PRN medications: acetaminophen, albuterol, alum-mag hydroxide-simeth, famotidine, hydrOXYzine pamoate, melatonin, OLANZapine **OR** OLANZapine, oxyCODONE, oxygen, psyllium, traMADol               Assessment/Plan   1) Other Specified Depressive Disorder       Plan: 1) Fluoxetine 20 mg Qdaily (continue)                2) Discontinued Mirtazapine 7.5 -> 15 mg at bedtime (sleep)                3) Trazodone 100 mg at bedtime.     Discussed potential risks, benefits, and alternatives to medications with patient, who consented to the above medications.     2) Major Neurocognitive Disorder       Plan: 1) MoCA (03-) = 14 out of 30 (Moderate Cognitive Impairment).    3) Delirium, acute - resolved       Plan: 1) Discontinue  trial Depakote 250 -> 125 > 0.0 mg Q17:00 and Q21:00     4) HTN       Plan: 1) IM service to follow and manage.     5) COPD       Plan: 1) IM service to follow and manage.     6) ARTEMIO       Plan: 1) IM service to follow and manage.     7) Diverticulitis       Plan: 1) IM service to follow and manage.     8) GERD       Plan: 1) IM service to follow and manage.    9) Right hip pain       Plan: 1) IM service to follow and manage.    10) Cholelithiasis       Plan: 1) The patient is recovered from his (outpatient) cholecystectomy and is appropriate for discharge. No abdominal symptoms elicited other than some minimal pain at the surgical puncture sites (healing).             Palomo Tiwari MD

## 2024-03-20 VITALS
HEART RATE: 87 BPM | WEIGHT: 224.43 LBS | SYSTOLIC BLOOD PRESSURE: 109 MMHG | HEIGHT: 73 IN | RESPIRATION RATE: 20 BRPM | BODY MASS INDEX: 29.74 KG/M2 | OXYGEN SATURATION: 92 % | DIASTOLIC BLOOD PRESSURE: 62 MMHG | TEMPERATURE: 97.5 F

## 2024-03-20 PROCEDURE — 2500000001 HC RX 250 WO HCPCS SELF ADMINISTERED DRUGS (ALT 637 FOR MEDICARE OP): Performed by: NURSE PRACTITIONER

## 2024-03-20 PROCEDURE — 99239 HOSP IP/OBS DSCHRG MGMT >30: CPT | Performed by: PSYCHIATRY & NEUROLOGY

## 2024-03-20 PROCEDURE — 2500000005 HC RX 250 GENERAL PHARMACY W/O HCPCS: Performed by: NURSE PRACTITIONER

## 2024-03-20 PROCEDURE — 2500000004 HC RX 250 GENERAL PHARMACY W/ HCPCS (ALT 636 FOR OP/ED): Performed by: NURSE PRACTITIONER

## 2024-03-20 PROCEDURE — 97110 THERAPEUTIC EXERCISES: CPT | Mod: GO

## 2024-03-20 PROCEDURE — 2500000002 HC RX 250 W HCPCS SELF ADMINISTERED DRUGS (ALT 637 FOR MEDICARE OP, ALT 636 FOR OP/ED): Performed by: NURSE PRACTITIONER

## 2024-03-20 RX ORDER — PANTOPRAZOLE SODIUM 40 MG/1
40 TABLET, DELAYED RELEASE ORAL
Qty: 30 TABLET | Refills: 0 | Status: SHIPPED | OUTPATIENT
Start: 2024-03-21 | End: 2024-03-20 | Stop reason: HOSPADM

## 2024-03-20 RX ORDER — LISINOPRIL 10 MG/1
10 TABLET ORAL DAILY
Qty: 30 TABLET | Refills: 0 | Status: SHIPPED | OUTPATIENT
Start: 2024-03-21 | End: 2024-04-20

## 2024-03-20 RX ORDER — OXYCODONE HYDROCHLORIDE 5 MG/1
5 TABLET ORAL EVERY 6 HOURS PRN
Qty: 15 TABLET | Refills: 0 | Status: SHIPPED | OUTPATIENT
Start: 2024-03-20 | End: 2024-03-23

## 2024-03-20 RX ORDER — POLYETHYLENE GLYCOL 3350 17 G/17G
17 POWDER, FOR SOLUTION ORAL DAILY PRN
Qty: 30 PACKET | Refills: 0 | Status: SHIPPED | OUTPATIENT
Start: 2024-03-20 | End: 2024-04-19

## 2024-03-20 RX ADMIN — CALCIUM CARBONATE (ANTACID) CHEW TAB 500 MG 1000 MG: 500 CHEW TAB at 08:17

## 2024-03-20 RX ADMIN — Medication 1000 UNITS: at 08:17

## 2024-03-20 RX ADMIN — NYSTATIN: 100000 OINTMENT TOPICAL at 08:16

## 2024-03-20 RX ADMIN — ACETAMINOPHEN 650 MG: 325 TABLET ORAL at 08:17

## 2024-03-20 RX ADMIN — TAMSULOSIN HYDROCHLORIDE 0.4 MG: 0.4 CAPSULE ORAL at 08:17

## 2024-03-20 RX ADMIN — FLUOXETINE HYDROCHLORIDE 20 MG: 20 CAPSULE ORAL at 08:17

## 2024-03-20 RX ADMIN — PANTOPRAZOLE SODIUM 40 MG: 40 TABLET, DELAYED RELEASE ORAL at 06:07

## 2024-03-20 RX ADMIN — CARVEDILOL 12.5 MG: 12.5 TABLET, FILM COATED ORAL at 08:17

## 2024-03-20 RX ADMIN — IBUPROFEN 600 MG: 600 TABLET, FILM COATED ORAL at 12:46

## 2024-03-20 RX ADMIN — LIDOCAINE 1 PATCH: 4 PATCH TOPICAL at 08:16

## 2024-03-20 RX ADMIN — ASPIRIN 81 MG: 81 TABLET, COATED ORAL at 08:17

## 2024-03-20 RX ADMIN — POLYETHYLENE GLYCOL 3350 17 G: 17 POWDER, FOR SOLUTION ORAL at 08:16

## 2024-03-20 RX ADMIN — IBUPROFEN 600 MG: 600 TABLET, FILM COATED ORAL at 06:07

## 2024-03-20 RX ADMIN — ACETAMINOPHEN 650 MG: 325 TABLET ORAL at 14:26

## 2024-03-20 RX ADMIN — ACETAMINOPHEN 650 MG: 325 TABLET ORAL at 02:19

## 2024-03-20 RX ADMIN — LISINOPRIL 10 MG: 5 TABLET ORAL at 08:17

## 2024-03-20 ASSESSMENT — COGNITIVE AND FUNCTIONAL STATUS - GENERAL
DRESSING REGULAR LOWER BODY CLOTHING: TOTAL
EATING MEALS: A LOT
HELP NEEDED FOR BATHING: TOTAL
PERSONAL GROOMING: TOTAL
DRESSING REGULAR UPPER BODY CLOTHING: TOTAL
TOILETING: TOTAL
DAILY ACTIVITIY SCORE: 7

## 2024-03-20 ASSESSMENT — PAIN - FUNCTIONAL ASSESSMENT
PAIN_FUNCTIONAL_ASSESSMENT: 0-10
PAIN_FUNCTIONAL_ASSESSMENT: 0-10

## 2024-03-20 ASSESSMENT — PAIN SCALES - GENERAL: PAINLEVEL_OUTOF10: 7

## 2024-03-20 ASSESSMENT — PAIN DESCRIPTION - DESCRIPTORS: DESCRIPTORS: ACHING;DISCOMFORT

## 2024-03-20 NOTE — CARE PLAN
"The patient's goals for the shift include \"get out\"    The clinical goals for the shift include maintain safety    Problem: Sensory Perceptual Alteration as Evidenced by  Goal: Discusses signs/symptoms of illness/treatment options  Outcome: Progressing  Goal: Initiates reality-based interactions  Outcome: Progressing     Over the shift, the patient did make progress toward the following goals.    "

## 2024-03-20 NOTE — DISCHARGE INSTR - APPOINTMENTS
Discharge to:    [facility],  [address]  [place], [place] [address].  P. [phone]  F. [phone];    ( To be seen by their consulting psychiatrist while receiving skilled physical therapy and rehab);    Also,     To follow with existing   [facility] Providers after discharge from [facility].

## 2024-03-20 NOTE — GROUP NOTE
Group Topic: Gross Motor/Balance Skills   Group Date: 3/20/2024  Start Time: 1400  End Time: 1500  Facilitators: ANUPAMA Dos SantosS; ANUPAMA BolañosS   Department: [facility] REHAB THERAPY VIRTUAL    Number of Participants: 8   Group Focus: leisure skills and other physical leisure  Treatment Modality: Recreation Therapy  Interventions utilized were leisure development, physical activity (corkaine)   Purpose: coping skills, leisure awareness, movement, and social stimulation    Name: Donald Mcknight YOB: 1950   MR: [id number]      Facilitator: Recreational Therapist  Level of Participation: did not attend  Progress: None  Comments: Group participants were introduced to a physical leisure activity called “Corkaine”. The patients were split into two teams and asked to over or underhand toss objects towards a target. All participants were informed of the rules and how to play. Throughout the session patients were encouraged to complete movement/coordination tasks, were provided social stimulation, and offered a pleasurable leisure activity.    Plan: continue with services

## 2024-03-20 NOTE — CARE PLAN
"The patient's goals for the shift include \"to get out of here\"    The clinical goals for the shift include treatment compliant    Over the shift, the patient did not make progress toward the following goals. Barriers to progression include acuteness of illness. Recommendations to address these barriers include positive reinforcement.      Problem: Sensory Perceptual Alteration as Evidenced by  Goal: Cooperates with admission process  Outcome: Progressing  Goal: Patient/Family participate in treatment and discharge plans  Outcome: Progressing  Goal: Patient/Family verbalizes awareness of resources  Outcome: Progressing  Goal: Participates in unit activities  Outcome: Progressing  Goal: Discusses signs/symptoms of illness/treatment options  Outcome: Progressing  Goal: Initiates reality-based interactions  Outcome: Progressing  Goal: Able to discuss content of hallucinations/delusions  Outcome: Progressing  Goal: Notifies staff when experiencing hallucinations/delusions  Outcome: Progressing  Goal: Verbalizes reduction in hallucinations/delusions  Outcome: Progressing  Goal: Will not act on psychotic perception  Outcome: Progressing  Goal: Understands least restrictive measures  Outcome: Progressing  Goal: Free from restraint events  Outcome: Progressing     Problem: Altered Thought Processes as Evidenced by  Goal: STG - Desires improvement in ability to think and concentrate  Outcome: Progressing  Goal: STG - Participates in Occupational Therapy and other cognitive assessments  Outcome: Progressing     Problem: Potential for Harm to Self or Others  Goal: Cooperates with admission process  Outcome: Progressing  Goal: Participates in unit activities  Outcome: Progressing  Goal: Patient/Family participate in treatment and discharge plans  Outcome: Progressing  Goal: Identifies deescalation techniques  Outcome: Progressing  Goal: Understands least restrictive measures  Outcome: Progressing  Goal: Identifies stressors that " lead to harmful behaviors  Outcome: Progressing  Goal: Notifies staff when experiencing harmful thoughts toward self/others  Outcome: Progressing  Goal: Denies harm toward self or others  Outcome: Progressing  Goal: Free from restraint events  Outcome: Progressing     Problem: Educational/Scholastic Disruption  Goal: Meets educational requirements during hospitalization  Outcome: Progressing  Goal: Attends class without disruptive behavior  Outcome: Progressing  Goal: Completes daily assignments  Outcome: Progressing     Problem: Ineffective Coping  Goal: Cooperates with admission process  Outcome: Progressing  Goal: Identifies ineffective coping skills  Outcome: Progressing  Goal: Identifies healthy coping skills  Outcome: Progressing  Goal: Demonstrates healthy coping skills  Outcome: Progressing  Goal: Participates in unit activities  Outcome: Progressing  Goal: Patient/Family participate in treatment and discharge plans  Outcome: Progressing  Goal: Patient/Family verbalizes awareness of resources  Outcome: Progressing  Goal: Understands least restrictive measures  Outcome: Progressing  Goal: Free from restraint events  Outcome: Progressing     Problem: Alteration in Sleep  Goal: STG - Reports nightly sleep, duration, and quality  Outcome: Progressing  Goal: STG - Identifies sleep hygiene aids  Outcome: Progressing  Goal: STG - Informs staff if unable to sleep  Outcome: Progressing  Goal: STG - Attends breathing and relaxation group  Outcome: Progressing     Problem: Potential for Substance Withdrawal  Goal: Verbalizes signs/symptoms of withdrawal  Outcome: Progressing  Goal: Reports signs/symptoms of withdrawal  Outcome: Progressing  Goal: Free of withdrawal symptoms  Outcome: Progressing     Problem: Anxiety  Goal: Patient/family understands admission protocols  Outcome: Progressing  Goal: Attempts to manage anxiety with help  Outcome: Progressing  Goal: Verbalizes ways to manage anxiety  Outcome:  Progressing  Goal: Implements measures to reduce anxiety  Outcome: Progressing  Goal: Free from restraint events  Outcome: Progressing     Problem: Self Care Deficit  Goal: STG - Patient completes hygiene  Outcome: Progressing  Goal: Increase group attendance  Outcome: Progressing  Goal: Accepts need for medications  Outcome: Progressing  Goal: STG - Goes to and eats meals independently in dining room 100% of time  Outcome: Progressing     Problem: Defensive Coping  Goal: Cooperates with admission process  Outcome: Progressing  Goal: Identifies reckless/dangerous behavior  Outcome: Progressing  Goal: Identifies stressors that lead to reckless/dangerous behavior  Outcome: Progressing  Goal: Discusses and identifies healthy coping skills  Outcome: Progressing  Goal: Demonstrates healthy coping skills  Outcome: Progressing  Goal: Identifies appropriate social interaction  Outcome: Progressing  Goal: Demonstrates appropriate social interactions  Outcome: Progressing  Goal: Patient/Family verbalizes awareness of resources  Outcome: Progressing  Goal: Discusses signs/symptoms of illness/treatment options  Outcome: Progressing  Goal: Patient/Family participate in treatment and discharge plans  Outcome: Progressing  Goal: Understands least restrictive measures  Outcome: Progressing  Goal: Free from restraint events  Outcome: Progressing     Problem: Pain  Goal: My pain/discomfort is manageable  Outcome: Progressing

## 2024-03-20 NOTE — SIGNIFICANT EVENT
03/20/24 1259   Discharge Planning   Living Arrangements Spouse/significant other;Children   Support Systems Spouse/significant other;Children   Assistance Needed needs skilled nursing facilty/ skilled rehab/pt to improve his functioning.   Type of Residence Private residence   Who is requesting discharge planning? Provider   Type of Post Acute Facility Services Skilled nursing   Patient expects to be discharged to: [facility].   Does the patient need discharge transport arranged? Yes   RoundTrip coordination needed? Yes   Has discharge transport been arranged? Yes   What day is the transport expected? 03/20/24   What time is the transport expected? 1430   Patient Choice   Provider Choice list and CMS website (https://medicare.gov/care-compare#search) for post-acute Quality and Resource Measure Data were provided and reviewed with: Other (Comment)  (selected facilty was chosen by list of VA Contracted Agencies, wife and patient chose.)   Patient / Family choosing to utilize agency / facility established prior to hospitalization No     Precert came through, VA approved it.  [facility] agreed to accept today;  Scheduled ambulance via Round Trip;  ETA of [facility] is 2:30 pm with ETA at [facility] expected to be about 4 pm.  Phoned wife and informed her of the same. Patient signed IMM Medicare letter and was also notified; he is stabilized and discharged today at 2:30 pm.

## 2024-03-20 NOTE — NURSING NOTE
"Pt is in bed pleasant and cooperative. Pt is brighter then he has been . Pt stated \" I feel much better\" Anxiety 2/10 depression 4/10. Pt denied SI/HI and A/V/H. Safety maintained. Med compliant. Pt contracted for safety with this staff at this time.  Pt being discharge today to [facility] . Gave report to Tonya at facility. Discharge instructions and medications were reviewed with pt and they verbalized understanding and readiness for discharge. Pt's belongings returned and discharged from the unit per doctor's order.   "

## 2024-03-20 NOTE — NURSING NOTE
"03/19/2024 @2215- Pt was calm and cooperative. Pt rated anxiety 1/10, depression 10/10. Pt denied SI/HI and AVH. Pt rated pain 10/10 in abdomen. Pt took all scheduled medications, PRN melatonin given. Pt stated his goal is \"get out,\" and his coping skill is hunting.\" Pt stated his strengths are \"hands on.\" Pt is currently sleeping in bed without any obvious physical signs or symptoms. No new orders to carry out at this time. Q15 minute safety checks maintained.    03/20/2024 @0530-Pt had an uneventful night, pt slept well throughout. No PRNS required. Pt is currently sleeping in bed without any obvious physical signs or symptoms. No new orders to carry out at this time. Q15 minute safety checks maintained.  "

## 2024-03-20 NOTE — DISCHARGE SUMMARY
"Admission Date: 3-  Discharge Date: 3-        Reason For Admission: Suicidal Ideation        Discharge Diagnosis  1) Other Specified Depressive Disorder   2) Major Neurocognitive Disorder   3) Delirium, acute - resolved   4) HTN  5) COPD  6) ARTEMIO  7) Diverticulitis  8) GERD  9) left shoulder pain  10) Cholelithiasis (Cholecystectomy)              Initial Admission :   Donald Mcknight is a 73 y.o. year old male patient who was brought in by his wife and daughter due to a change in personality over the past month, and suicidal thoughts (asking for a gun stating \"I want to shoot myself\" and asking for a knife so he can kill himself - see EPAT note below). On admission, Donald was not able to be aroused to participate in the evaluation currently. Donald has been intermittently agitated (throwing objects including his clothing), taking his clothing off, and incontinent of urine and stool.             Per EPAT Assessment of 1-2024:  The patient is a 73 yr old male with no psychiatric history. He presents in the ED with concerns of chest pain and and family is concerned he is hallucinating. According to ED notes, the patient’s family believes that the patient may be hallucinating due to saying “odd and off the wall things” though he doesn’t appear to be showing signs of any hallucinating. The patient recently had covid about 2 weeks ago and has been having health difficulties since. The patient also has been refusing to eat recently with no medical complaints of nausea or vomiting. The patient scored low risk for suicide on the C-SSRS when assessed in the ED. The patient was referred to [facility] for psychiatric evaluation.      The patient is a 73 yr old male with a history of PTSD from war in the [place] . He presents in the ED with concerns of suicidal ideation and psychosis. The patient was cooperative throughout the EPAT evaluation.   The patient denies any SI/HI/VH. He endorses that he does hear voices " sometimes. The patient pointed to the corner of the ceiling and said, “For example, I hear people talking in that corner”. The patient endorsed that he was trying synthetic marijuana gummies but “threw them away”. He declines needing any substance use treatment. He also reports that he hasn’t had a drink of alcohol in 3 years. The patient did appear to be a little disoriented and reports that he “just needs to sleep because I haven’t slept in 8 days because of my restless leg syndrome”. He also reports that he doesn’t have an appetite and won’t eat. When asked if he isn’t eating because his stomach hurts the patient said “Yeah”. Then he said, “I just need to sleep”        Collateral: This writer spoke to the patient’s wife and daughter at the same time on the phone. They are highly worried about the patient. They report an acute change in personality and behavior within the past month. The patient did have covid 2 weeks ago but the acute change has been going on for a month now. They report that the patient has asked for a gun saying, “I want to shoot myself.” They also report him asking for a knife so that he could kill himself. The patient’s daughter and wife report that he has had flashbacks in the past but now they are a lot more frequent. The daughter and wife report that the patient has been yelling and screaming things at them like, “Get in the foxhole!” The patient’s daughter also reports that he has always “dressed to the nines” and took care of his hygiene. She reports a rapid decline in patient taking care of himself. She reports, “I don’t know who this person is. My dad needs help.” The patient’s wife reports that the patient does have a gun but it is in a safe and she has the key and that the patient does not know where the key is.        The patient scored low risk for suicide on the C-SSRS when assessed in the ED. The patient scored high risk for suicide on the C-SSRS after taking into consideration  "reports from the patient’s wife and daughter. The patient is also experiencing an acute change in personality/behavior. Dr. Koroma also reports that the patient has come in multiple times with chest pain and they have done all the medical work ups and testing for the patient and nothing medically has been found. The patient meets criteria for inpatient psychiatric admission. Watkins MD agrees with inpatient psychiatric admission.        Hospital Course:       Following admission to the [facility] at [facility], Donald was continued on his Fluoxetine 20 mg Qdaily, to help treat his depressive symptoms. Donald also attended a few unit groups to help with coping skills. At the time of discharge, Donald denied experiencing any hallucinations, paranoia, significant anxiety, manic symptoms, or symptoms of Major Depressive Disorder.    Donald had MoCA testing done on 03- with a resultant score of 14 out of 30 (Moderate Cognitive Impairment).            Mental Status Exam:   General: Appropriately groomed and dressed in hospital attire, laying in bed, awake and alert.   Appearance: Appears stated age.   Attitude: Calm, cooperative.   Behavior: Appropriate eye contact.   Motor Activity: No agitation or retardation. No EPS/TD. Normal gait and station. Normal muscle tone and bulk.   Speech: Regular rate, rhythm, volume and tone, spontaneous,  fluent. Non-pressured.   Mood: \"Good\"   Affect: Pleasant-to-neutral.   Thought Process: Mostly organized, and goal directed.   Thought Content: Does not endorse suicidal ideation or any suicide plans.   Does not endorse homicidal ideation.  No overt delusions or paranoia elicited.   Thought Perception: Does not endorse auditory or visual hallucinations, does not appear to be responding to hallucinatory stimuli.   Cognition: Alert, oriented x 3. Adequate fund of knowledge. Some deficits in recent and remote memory. Some deficits in attention and concentration. Intact " "language.  MoCA (03-) = 14 out of 30 (Moderate Cognitive Impairment).    Insight: Poor, as patient does not recognize many symptoms of illness and need for recommended treatments.    Judgment: Poor-to-fair, as patient can make reasonable decisions about ordinary activities of daily living and necessary medical care recommendations.            Risk Assessment at Discharge:  Donald is judged a minimal suicide risk due to: 1) No guns at home, 2) Denies any prior suicide attempts, 3) Denies any current suicidal ideation or suicide plans, 4) +plans for the future: \"... To get real healthy...,\" and 5) No symptoms of Major Depressive Disorder elicited at discharge.              Discharge Medications:  Scheduled medications  acetaminophen, 650 mg, oral, q6h  aspirin, 81 mg, oral, Daily  atorvastatin, 80 mg, oral, Nightly  calcium carbonate, 1,000 mg, oral, Daily  carvedilol, 12.5 mg, oral, BID  cholecalciferol, 1,000 Units, oral, Daily  donepezil, 10 mg, oral, Nightly  FLUoxetine, 20 mg, oral, Daily  ibuprofen, 600 mg, oral, q6h BEN  lidocaine, 1 patch, transdermal, Daily  lisinopril, 10 mg, oral, Daily  nystatin, , Topical, BID  pantoprazole, 40 mg, oral, Daily before breakfast  perflutren lipid microspheres, 0.5-10 mL of dilution, intravenous, Once in imaging  polyethylene glycol, 17 g, oral, Daily  tamsulosin, 0.4 mg, oral, Daily  traZODone, 100 mg, oral, Nightly      Continuous medications     PRN medications  PRN medications: acetaminophen, albuterol, alum-mag hydroxide-simeth, famotidine, hydrOXYzine pamoate, melatonin, OLANZapine **OR** OLANZapine, oxyCODONE, oxygen, psyllium, traMADol         I spent over 30 minutes in the preparation of this summary. All 11 elements of the transition record were discussed with the patient and or caregiver and the receiving inpatient facility (if applicable).  A copy of the transition record was given to the patient and was transmitted to the outpatient provider accepting the " patient's care following  discharge.    Patient's illness, medication side effects, benefits and risk were reviewed with the patient prior to discharge. The patient voiced understanding of their diagnosis, the medications recommended along with the importance of mediation compliance.  The patient was counseled not to stop medications without the supervision of a psychiatrist.  The patient was counseled to follow-up with their outpatient medical provider as indicated. The patient was counseled that if there was an increase in mental health issues, depression, anxiety, medication side effects, self harming thoughts or thoughts to harm others, to call [facility] or [facility] and come to the nearest emergency room. The patient also received information regarding advanced mental and medical health directives during this hospitalization which they could discuss with their outpatient provider. The plan was discussed with the patient, the nurse and the social work department. The patient voiced understanding and agreement with the plan.  ------------------------------------------------------------------------------------------------------------------------------------------------------------------------------------------------------  Substance Use Risk Assessment:    Nicotine: Risks of continued tobacco use was addressed with the patient which included: inpatient education and counseling of the risks of oral, esophageal as well as other organ cancers (including oral, dermatological, gastric, pancreatic, respiratory) along with the ongoing risk of neurological and cardiovascular disease/events (strokes, angina). Treatment options for cessation were offered to include: alternate tobacco products, both inpatient/outpatient counseling. Replacement products were offered during this admission and prescribed at the time of discharge along with a referral to outpatient cessation counseling.    Alcohol: The increase in morbidity and  mortality, financial, both interpersonal and physical health risk in direct relationship to the use of alcohol ( in either a binge pattern or a sustained use over time) was discussed with the patient. Risks of intoxication, disinhibition, legal and interpersonal issues as well as abuse and dependence, along with the    increased risks of organ damage (cardiac, neurological, esophageal, gastric, liver, pancreatic, renal dysfunction among others) was discussed. The risks of decreased hepatic clearance and increased medication serum drug levels along with increase in potential medication side effects, was also discussed.   Options for treatment: Discussed was reduction in alcohol consumption, referral to dual diagnosis program, residential rehabilitation programs, AA, NA, DANIELE, gabapentin and oral naltrexone, if meets criteria as a candidate for these medications.    Street Drugs: Street drug use was addressed on admission, including both physical, mental, financial and psychological risk factors of ongoing use. There are no FDA prescribed treatment medications for cannabis, stimulants use/abuse (cocaine, PCP) or hallucinogens.  Patient was screened for concomitant other drugs used (tobacco, alcohol). Treatment options available were discussed ( if applicable) AA, NA, DANIELE, and outpatient dual diagnosis therapy, treatment programs. Patient voiced understanding of their treatment options.          Plan:  1) Continue current medications as prescribed at discharge.  2) Follow-up:      [facility],  [address]  [place], [place] [id number].  P. [phone]  F. [phone];     ( To be seen by their consulting psychiatrist while receiving skilled physical therapy and rehab);     Also,      To follow with existing   [facility]iders after discharge from [facility].          Palomo Tiwari MD

## 2024-03-20 NOTE — PROGRESS NOTES
Occupational Therapy    OT Treatment & POC Re-check    Patient Name: Donald Mcknight  MRN: [id number]  Today's Date: 3/20/2024  Time Calculation  Start Time: 0924  Stop Time: 0932  Time Calculation (min): 8 min         Assessment:  OT Assessment: Pt continues to demo impaired cognition, decreased strength, decreased activity tolerance and impaired balance, and is requiring assistance for all ADL and mobility at this time. Will continue to receive OT services in order to optimize pt independence and function. Barriers to pt progress include impaired cognition, physical deconditioning and variable levels of appropriateness/ pt participation in treatment sessions.  End of Session Communication: PCT/NA/CTA  End of Session Patient Position: Bed, 3 rail up, Alarm on  OT Assessment Results: Decreased ADL status, Decreased upper extremity range of motion, Decreased safe judgment during ADL, Decreased cognition, Decreased endurance, Decreased functional mobility  Evaluation/Treatment Tolerance: Treatment limited secondary to medical complications (Comment) (variable cognition and rapidly fluccuating mood)  Medical Staff Made Aware: Yes  Barriers to Participation: Ability to acquire knowledge, Comorbidities, Insight into problems    Plan:  Treatment Interventions: ADL retraining, Functional transfer training, UE strengthening/ROM, Compensatory technique education  OT Frequency: 3 times per week  OT Discharge Recommendations: Moderate intensity level of continued care  OT - OK to Discharge: Yes  Treatment Interventions: ADL retraining, Functional transfer training, UE strengthening/ROM, Compensatory technique education    Subjective   Previous Visit Info:  OT Last Visit  OT Received On: 03/20/24    General:  General  Reason for Referral: 72 yo male referred to OT for impaired ADL and safety assessment. Re-check completed today to reassess goals and POC after surgical procedure 3/19 and extended LOS due to multiple  "complications  Prior to Session Communication: Bedside nurse  Patient Position Received: Bed, 3 rail up, Alarm on  General Comment: Sleeping, but wakes easily for treatment. Minimally participatory, only agreeable to supine extercises despite max encouragement.    Precautions:  Medical Precautions: Fall precautions  Precautions Comment: Suicie precautions, 3L O2    Pain:  Pain Assessment  Pain Assessment: 0-10  Pain Score:  (Unrated pain \"from my hairline to my toenails\")    Objective    Cognition:  Cognition  Overall Cognitive Status: Impaired at baseline    Bed Mobility/Transfers: Bed Mobility  Bed Mobility:  (Partial long sit completed with MaxA, pt resistive and declines further mobility)  Therapy/Activity: Therapeutic Exercise  Therapeutic Exercise Performed: Yes  Therapeutic Exercise Activity 1: Supine: AAROM to encourage participation and attention to task. x15 B chest press, x15 B overhead shoulder flex/ext, x15 B elbow flex/ext. x2 sets completed. Light resistance applied in all planes by therapist    Therapeutic Activity  Therapeutic Activity Performed: No (Pt adamantly declines setup and assistance with all ADL offered)    RUE   RUE :  (slightly limited overhead ROM)   LUE   LUE:  (slightly limited overhead ROM)      Outcome Measures:[facility] Daily Activity  Putting on and taking off regular lower body clothing: Total  Bathing (including washing, rinsing, drying): Total  Putting on and taking off regular upper body clothing: Total  Toileting, which includes using toilet, bedpan or urinal: Total  Taking care of personal grooming such as brushing teeth: Total  Eating Meals: A lot  Daily Activity - Total Score: 7        Education Documentation  Body Mechanics, taught by Jessica Ayala OT at 3/20/2024 10:37 AM.  Learner: Patient  Readiness: Nonacceptance  Method: Explanation  Response: No Evidence of Learning    Goals:  Encounter Problems       Encounter Problems (Active)         OT Goals       Pt will demo " ADL routine and meaningful daily activities with Marina using modifications as needed  (Progressing)       Start:  03/05/24    Expected End:  04/03/24            Patient will attend to therapeutic task for 10 minutes with minimum verbal cues demonstrating improved attention and participation in daily activities.  (Progressing)       Start:  03/05/24    Expected End:  04/03/24            Pt will demo functional transfers to/ from EOB, chair and commode with Marina and LRD (Progressing)       Start:  03/05/24    Expected End:  04/03/24            Pt will ID/ utilize 1-2 ways to increase balance of activity/ re-involve self in functional daily routines/roles prior to discharge.   (Not met)       Start:  03/05/24    Expected End:  03/25/24    Resolved:  03/20/24    Updated to: Pt will demonstrate increased participation in daily tasks and activities, by attending 1 group per day with assistance from staff for mobility    Update reason: Goal no longer appropriate         Pt will explore and ID 1-2 strategies to manage stressors/symptoms of illness/ grief more effectively prior to discharge.   (Not met)       Start:  03/05/24    Expected End:  03/25/24    Resolved:  03/20/24    Updated to: Pt will ID 2-3 STG and 1-2 LTG, including methods to achieve goals after discharge    Update reason: Goal no longer appropriate         Pt will demonstrate increased participation in daily tasks and activities, by attending 1 group per day with assistance from staff for mobility (Progressing)       Start:  03/20/24    Expected End:  04/03/24                Pt will ID 2-3 STG and 1-2 LTG, including methods to achieve goals after discharge (Progressing)       Start:  03/20/24    Expected End:  04/03/24

## 2024-03-22 LAB
LABORATORY COMMENT REPORT: NORMAL
PATH REPORT.FINAL DX SPEC: NORMAL
PATH REPORT.GROSS SPEC: NORMAL
PATH REPORT.RELEVANT HX SPEC: NORMAL
PATH REPORT.TOTAL CANCER: NORMAL

## 2024-03-22 NOTE — SIGNIFICANT EVENT
Follow Up Phone Call    Outgoing phone call    Spoke to: Donald Mcknight Relationship:self   Phone number: [phone]      Outcome: I left a message on answering machine   No chief complaint on file.         Diagnosis:Not applicable

## 2024-05-08 LAB
ATRIAL RATE: 87 BPM
P AXIS: 51 DEGREES
P OFFSET: 206 MS
P ONSET: 143 MS
PR INTERVAL: 152 MS
Q ONSET: 219 MS
QRS COUNT: 14 BEATS
QRS DURATION: 82 MS
QT INTERVAL: 382 MS
QTC CALCULATION(BAZETT): 459 MS
QTC FREDERICIA: 432 MS
R AXIS: 43 DEGREES
T AXIS: 59 DEGREES
T OFFSET: 410 MS
VENTRICULAR RATE: 87 BPM

## 2025-07-17 NOTE — GROUP NOTE
Group Topic: Spiritual/Devotional/Thought of the Day   Group Date: 3/9/2024  Start Time: 0930  End Time: 1005  Facilitators: TAN Dos Santos   Department: [facility]    Number of Participants: 8   Group Focus: goals, impulsivity, and personal responsibility  Treatment Modality: Other: Recreational Therapy   Interventions utilized were exploration, patient education, and support  Purpose: maladaptive thinking, insight or knowledge, and other: impulse control, daily goals, identifying personal values/morals     Name: Donald Mcknight YOB: 1950   MR: [id number]      Facilitator: Recreational Therapist  Level of Participation: did not attend  Progress: None  Comments: Patient declined invitation to group activity at this time. Patient will continue to be provided with opportunities to enhance leisure skills and/or coping mechanisms.  Plan: continue with services       Stay away from caffeine, smoking, alcohol, chocolate, spicy, dairy.  You can take Maalox or Mylanta 30 cc before each meal and at bedtime.

## (undated) DEVICE — ASSEMBLY, STRYKER FLOW 2, SUCTION IRRIGATOR, WITH TIP

## (undated) DEVICE — RETRIEVAL SYSTEM, MONARCH INZII, 5MM

## (undated) DEVICE — Device

## (undated) DEVICE — SOLUTION, IRRIGATION, SODIUM CHLORIDE 0.9%, 1000 ML, POUR BOTTLE

## (undated) DEVICE — SUTURE, VICRYL, 4-0, 18 IN, PS2, UNDYED

## (undated) DEVICE — DRAPE, INSTRUMENT, W/POUCH, STERI DRAPE, 9 5/8 X 18 LONG

## (undated) DEVICE — SCISSOR, MINI ENDO CUT, TIPS, DISP

## (undated) DEVICE — SUTURE, VICRYL, 0, 27 IN, UR-6, VIOLET

## (undated) DEVICE — TISSUE ADHESIVE, PREMIERPRO EXOFIN, PRECISION PEN HV, 1.0ML

## (undated) DEVICE — ACCESS SYS, KII SHIELDED BLADED, Z-THREAD, 5X100CM

## (undated) DEVICE — CABLE, ELECTROSURGICAL, MONOPOLAR, LAPAROSCOPIC, 10 FT, LF

## (undated) DEVICE — ELECTRODE, ELECTROSURGICAL, BLADE, INSULATED, ENT/IMA, STERILE

## (undated) DEVICE — SOLUTION, INJECTION, USP, SODIUM CHLORIDE 0.9%, .9, NACL, 1000 ML, BAG

## (undated) DEVICE — NEEDLE, SAFETY, 21 G X 1.5 IN

## (undated) DEVICE — DRAPE PACK, LAPAROSCOPIC CHOLECYSTECTOMY, CUSTOM, GEAUGA

## (undated) DEVICE — TUBE SET, PNEUMOCLEAR, SMOKE EVACU, HIGH-FLOW

## (undated) DEVICE — NEEDLE, INSUFFLATION, 13GAX120MM, DISP

## (undated) DEVICE — TROCAR, KII OPTICAL SEPARATOR, 8MM X 100MM,  Z-THREAD

## (undated) DEVICE — APPLICATOR, CHLORAPREP, W/ORANGE TINT, 26ML

## (undated) DEVICE — CHOLANGIOGRAM SET, LAPAROSCOPIC, W/KARLAN BALLOON CATHETER, DOUBLE LUMEN, 4 FR, 60 CM

## (undated) DEVICE — TROCAR SYSTEM, BALLOON, KII GELPORT, 12 X 100MM

## (undated) DEVICE — CLIP, ENDO, CLINCH II, W/RATCHET, ON/OFF, CLINCH II, 5 MM

## (undated) DEVICE — CLIP, ENDO APPLIER LIGAMAX 5MM

## (undated) DEVICE — CARE KIT, LAPAROSCOPIC, ADVANCED

## (undated) DEVICE — CAUTERY, PENCIL, PUSH BUTTON, SMOKE EVAC, 70MM

## (undated) DEVICE — GOWN, ASTOUND, L